# Patient Record
Sex: MALE | Race: BLACK OR AFRICAN AMERICAN | Employment: FULL TIME | URBAN - METROPOLITAN AREA
[De-identification: names, ages, dates, MRNs, and addresses within clinical notes are randomized per-mention and may not be internally consistent; named-entity substitution may affect disease eponyms.]

---

## 2017-03-30 NOTE — PROGRESS NOTES
62 y.o. BLACK OR  male who presents for evaluation. He's here asking for refills of viagra. He's been  for 3 years but has found a new partner. No problem w drive, he just is not able to hold his erections. Wants std testing also    Past Medical History:   Diagnosis Date    Allergic rhinitis     Deviated septum and epistaxis Dr. Marla Patel 2009     Diabetes mellitus ([facility]     on basis of elev a1c 12/15    ED (erectile dysfunction)     Fatty liver     on US 6/10; Fib-4 was 0.91 from 5/12    H. pylori infection     EGD w dilation Dr Jami Schrader 3/15    HSV (herpes simplex virus) infection     Hyperlipidemia     Hypertension     Obesity     peak 292 lbs, bmi 37.8 from 2/14    Prediabetes 3/10    2 hr GTT nl     Prolactinoma ([facility]     Dr. Faiza Mcgee      Current Outpatient Prescriptions   Medication Sig    tadalafil (CIALIS) 20 mg tablet Take 1 Tab by mouth as needed.  ezetimibe (ZETIA) 10 mg tablet Take 1 Tab by mouth daily.  metFORMIN ER (GLUCOPHAGE XR) 500 mg tablet Take 2 Tabs by mouth daily (with dinner).  atorvastatin (LIPITOR) 80 mg tablet Take 1 Tab by mouth daily.  amLODIPine-benazepril (LOTREL) 2.5-10 mg per capsule Take 1 Cap by mouth daily.  cabergoline (DOSTINEX) 0.5 mg tablet Take 1 Tab by mouth every Monday.  omeprazole (PRILOSEC) 20 mg capsule Take 1 Cap by mouth daily.  valACYclovir (VALTREX) 1 gram tablet Take 1 Tab by mouth daily.  meloxicam (MOBIC) 15 mg tablet Take 1 Tab by mouth daily. No current facility-administered medications for this visit. Allergies   Allergen Reactions    Hydrochlorothiazide Other (comments)     Weight loss       Visit Vitals    /90    Pulse 91    Temp 98.4 °F (36.9 °C) (Oral)    Ht 6' 3\" (1.905 m)    Wt 292 lb (132.5 kg)    SpO2 99%    BMI 36.5 kg/m2   A&O x3  Affect is appropriate. Mood stable  No apparent distress  Anicteric, no JVD, adenopathy or thyromegaly.    No carotid bruits or radiated murmur  Lungs clear to auscultation, no wheezes or rales  Heart showed regular rate and rhythm. No murmur, rubs, gallops  Abdomen soft nontender, no hepatosplenomegaly or masses. Extremities without edema. Pulses 1-2+ symmetrically    Assessment and plan:  1. ED. We gave cialis, sfx discussed at length. Call if with problems or if he wants scripts for [place]      Above conditions discussed at length and patient vocalized understanding.   All questions answered to patient satifaction

## 2017-03-31 ENCOUNTER — OFFICE VISIT (OUTPATIENT)
Dept: INTERNAL MEDICINE CLINIC | Age: 57
End: 2017-03-31

## 2017-03-31 ENCOUNTER — TELEPHONE (OUTPATIENT)
Dept: INTERNAL MEDICINE CLINIC | Age: 57
End: 2017-03-31

## 2017-03-31 VITALS
SYSTOLIC BLOOD PRESSURE: 132 MMHG | TEMPERATURE: 98.4 F | OXYGEN SATURATION: 99 % | HEIGHT: 75 IN | WEIGHT: 292 LBS | HEART RATE: 91 BPM | DIASTOLIC BLOOD PRESSURE: 90 MMHG | BODY MASS INDEX: 36.31 KG/M2

## 2017-03-31 DIAGNOSIS — N52.8 OTHER MALE ERECTILE DYSFUNCTION: Primary | ICD-10-CM

## 2017-03-31 RX ORDER — TADALAFIL 20 MG/1
20 TABLET ORAL AS NEEDED
Qty: 10 TAB | Refills: 11 | Status: SHIPPED | OUTPATIENT
Start: 2017-03-31 | End: 2017-04-05 | Stop reason: SDUPTHER

## 2017-03-31 NOTE — MR AVS SNAPSHOT
Visit Information Date & Time Provider Department Dept. Phone Encounter #  
 3/31/2017  1:15 PM Jeffrey Amaya MD Internist of [address] [id number] Your Appointments 9/29/2017  2:30 PM  
Office Visit with Jeffrey Amaya MD  
Internist of [address] [facility] [address] [facility]) Appt Note: 6 mth f/u  
 [address] [facility], [address] [address] [address] [place]  
  
   
 [address], [address] Upcoming Health Maintenance Date Due Hepatitis C Screening 1960 FOOT EXAM Q1 2/1/1970 Pneumococcal 19-64 Medium Risk (1 of 1 - PPSV23) 2/1/1979 MICROALBUMIN Q1 11/27/2016 HEMOGLOBIN A1C Q6M 5/5/2017 LIPID PANEL Q1 11/5/2017 EYE EXAM RETINAL OR DILATED Q1 11/7/2017 DTaP/Tdap/Td series (5 - Td) 5/22/2022 COLONOSCOPY 6/22/2022 Allergies as of 3/31/2017  Review Complete On: 10/27/2016 By: Jeffrey Amaya MD  
  
 Severity Noted Reaction Type Reactions Hydrochlorothiazide    Other (comments) Weight loss Current Immunizations  Reviewed on 2/20/2017 Name Date DTaP 8/19/2009, 4/23/2002, 6/14/1998, 6/12/1998, 4/16/1998, 2/13/1998 HPV 9/15/2015, 9/5/2014 Hep A Vaccine 9/3/2013, 8/24/2012 Hep B Vaccine 6/12/1998, 1/12/1998, 12/12/1997 Hib 3/18/1999, 6/12/1998, 2/13/1998 IPV 4/23/2002, 6/14/1999, 4/16/1998, 2/13/1998 Influenza Vaccine 9/15/2015, 10/14/2014, 10/18/2013 Influenza Vaccine Whole 11/19/2011 MMR 4/23/2002, 12/15/1998 Meningococcal Vaccine 9/5/2014, 8/19/2009 TDAP Vaccine 5/22/2012 Zoster 5/22/2012 Zoster Vaccine, Live 8/15/2008, 12/15/1998 Not reviewed this visit Vitals BP Pulse Temp Height(growth percentile) Weight(growth percentile) SpO2  
 132/90 91 98.4 °F (36.9 °C) (Oral) 6' 3\" (1.905 m) 292 lb (132.5 kg) 99% BMI Smoking Status 36.5 kg/m2 Never Smoker Vitals History BMI and BSA Data Body Mass Index Body Surface Area  
 36.5 kg/m 2 2.65 m 2 Preferred Pharmacy Pharmacy Name Phone [facility] [address], [address] [facility] [phone] Your Updated Medication List  
  
   
This list is accurate as of: 3/31/17  1:57 PM.  Always use your most recent med list. amLODIPine-benazepril 2.5-10 mg per capsule Commonly known as:  Odean Mina Take 1 Cap by mouth daily. atorvastatin 80 mg tablet Commonly known as:  LIPITOR Take 1 Tab by mouth daily. cabergoline 0.5 mg tablet Commonly known as:  DOSTINEX Take 1 Tab by mouth every Monday.  
  
 ezetimibe 10 mg tablet Commonly known as:  Arleta Moron Take 1 Tab by mouth daily. meloxicam 15 mg tablet Commonly known as:  MOBIC Take 1 Tab by mouth daily. metFORMIN  mg tablet Commonly known as:  GLUCOPHAGE XR Take 2 Tabs by mouth daily (with dinner). omeprazole 20 mg capsule Commonly known as:  PRILOSEC Take 1 Cap by mouth daily. valACYclovir 1 gram tablet Commonly known as:  VALTREX Take 1 Tab by mouth daily. Introducing [facility] & HE[facility]! [employer] introduces [employer] patient portal. Now you can access parts of your medical record, email your doctor's office, and request medication refills online. 1. In your internet browser, go to [email or web address] 2. Click on the First Time User? Click Here link in the Sign In box. You will see the New Member Sign Up page. 3. Enter your [employer] Access Code exactly as it appears below. You will not need to use this code after youve completed the sign-up process. If you do not sign up before the expiration date, you must request a new code. · [employer] Access Code: [email or web address] Expires: 6/29/2017  1:57 PM 
 
4. Enter the last four digits of your Social Security Number (xxxx) and Date of Birth (mm/dd/yyyy) as indicated and click Submit.  You will be taken to the next sign-up page. 5. Create a [employer] ID. This will be your [employer] login ID and cannot be changed, so think of one that is secure and easy to remember. 6. Create a [employer] password. You can change your password at any time. 7. Enter your Password Reset Question and Answer. This can be used at a later time if you forget your password. 8. Enter your e-mail address. You will receive e-mail notification when new information is available in [address]. 9. Click Sign Up. You can now view and download portions of your medical record. 10. Click the Download Summary menu link to download a portable copy of your medical information. If you have questions, please visit the Frequently Asked Questions section of the [employer] website. Remember, [employer] is NOT to be used for urgent needs. For medical emergencies, dial 911. Now available from your iPhone and Android! Please provide this summary of care documentation to your next provider. Your primary care clinician is listed as Charli Phoenix. If you have any questions after today's visit, please call [phone].

## 2017-03-31 NOTE — TELEPHONE ENCOUNTER
Pt is calling for the medication RD was going to call in for him today at his appt. Pt did not know of the name and said he will call back Monday.

## 2017-03-31 NOTE — PROGRESS NOTES
1. Have you been to the ER, urgent care clinic or hospitalized since your last visit? NO.     2. Have you seen or consulted any other health care providers outside of the [address] [place] [address] since your last visit (Include any pap smears or colon screening)? NO      Do you have an Advanced Directive? NO    Would you like information on Advanced Directives?  YES

## 2017-04-04 ENCOUNTER — TELEPHONE (OUTPATIENT)
Dept: INTERNAL MEDICINE CLINIC | Age: 57
End: 2017-04-04

## 2017-04-04 NOTE — TELEPHONE ENCOUNTER
Pt calling says he needs information for website for his medication. Wants it written so he can . Says rd will know what he is talking about. Says they discussed at visit.

## 2017-04-05 RX ORDER — TADALAFIL 20 MG/1
20 TABLET ORAL AS NEEDED
Qty: 30 TAB | Refills: 11 | Status: SHIPPED | OUTPATIENT
Start: 2017-04-05 | End: 2017-09-29 | Stop reason: SDUPTHER

## 2017-06-09 DIAGNOSIS — B00.9 HSV INFECTION: ICD-10-CM

## 2017-06-09 RX ORDER — VALACYCLOVIR HYDROCHLORIDE 1 G/1
1000 TABLET, FILM COATED ORAL DAILY
Qty: 90 TAB | Refills: 3 | Status: SHIPPED | OUTPATIENT
Start: 2017-06-09 | End: 2017-09-29 | Stop reason: SDUPTHER

## 2017-09-28 NOTE — PROGRESS NOTES
62 y.o. BLACK OR  male who presents for evaluation. No cardiovascular complaints. BP has been running high when he checks outside    No polyuria, polydipsia, nocturia, vision change, not checking sugars regularly. Weight is up some as he has not been exercising and not watching his diet as closely. [place]     Vitals 2017 3/31/2017 10/27/2016 3/24/2016 2015   Weight 294 lb 292 lb 288 lb 280 lb      Vitals 2015   Weight 288 lb  282 lb     Denies any  or GI complaints outside of occasional constipation    No headache, vision change, breast discharge, remains on dostinex    Still having issues with the knee but back to normal activities    The cialis is working well, getting his supply from [place] [facility]    Past Medical History:   Diagnosis Date    Allergic rhinitis     Deviated septum and epistaxis Dr. Elder Ennis      Diabetes mellitus ([address]     on basis of elev a1c 12/15    ED (erectile dysfunction)     Fatty liver     on US 6/10; Fib-4 was 0.91 from     H. pylori infection     EGD w dilation Dr Darius Cintron 3/15    HSV (herpes simplex virus) infection     Hyperlipidemia     Hypertension     Obesity     peak 292 lbs, bmi 37.8 from     Prediabetes 3/10    2 hr GTT nl     Prolactinoma (HCC)     Dr. Noemy Dupont      Past Surgical History:   Procedure Laterality Date   Brii Bustamante  negative    HX GI      negative barium enema    HX ORTHOPAEDIC  2015    medial meniscus tear left knee Dr Keene  History    Marital status:      Spouse name: N/A    Number of children: 2    Years of education: N/A     Occupational History     NG      Social History Main Topics    Smoking status: Never Smoker    Smokeless tobacco: Never Used    Alcohol use No    Drug use: No    Sexual activity: Not on file     Other Topics Concern    Not on file     Social History Narrative     Allergies   Allergen Reactions    Hydrochlorothiazide Other (comments)     Weight loss       Current Outpatient Prescriptions   Medication Sig    valACYclovir (VALTREX) 1 gram tablet Take 1 Tab by mouth daily.  tadalafil (CIALIS) 20 mg tablet Take 1 Tab by mouth as needed.  ezetimibe (ZETIA) 10 mg tablet Take 1 Tab by mouth daily.  metFORMIN ER (GLUCOPHAGE XR) 500 mg tablet Take 2 Tabs by mouth daily (with dinner).  [START ON 10/2/2017] cabergoline (DOSTINEX) 0.5 mg tablet Take 1 Tab by mouth every Monday.  omeprazole (PRILOSEC) 20 mg capsule Take 1 Cap by mouth daily.  meloxicam (MOBIC) 15 mg tablet Take 1 Tab by mouth daily.  atorvastatin (LIPITOR) 80 mg tablet Take 1 Tab by mouth daily.  amLODIPine-benazepril (LOTREL) 5-20 mg per capsule Take 1 Cap by mouth daily. No current facility-administered medications for this visit. REVIEW OF SYSTEMS: colo 6/12 Dr Kaitlynn Dejesus  Ophtho - no vision change or eye pain  Oral - no mouth pain, tongue or tooth problems  Ears - no hearing loss, ear pain, fullness, no swallowing problems  Cardiac - no CP, PND, orthopnea, edema, palpitations or syncope  Chest - no breast masses  Resp - no wheezing, chronic coughing, dyspnea  GI - no heartburn, nausea, vomiting, change in bowel habits, bleeding, hemorrhoids  Urinary - no dysuria, hematuria, flank pain, urgency, frequency  Constitutional - no wt loss, night sweats, unexplained fevers  Neuro - no focal weakness, numbness, paresthesias, incoordination, ataxia, involuntary movements  Endo - no polyuria, polydipsia, nocturia, hot flashes    Visit Vitals    /89    Pulse 82    Temp 98.4 °F (36.9 °C) (Oral)    Resp 14    Ht 6' 3\" (1.905 m)    Wt 294 lb (133.4 kg)    SpO2 99%    BMI 36.75 kg/m2     A&O x3  Affect is appropriate. Mood stable  No apparent distress  Anicteric, no JVD, adenopathy or thyromegaly.    No carotid bruits or radiated murmur  Lungs clear to auscultation, no wheezes or rales  Heart showed regular rate and rhythm. No murmur, rubs, gallops  Abdomen soft nontender, no hepatosplenomegaly or masses. Extremities without edema.   Pulses 1-2+ symmetrically    LABS  From 2/10 showed    gluc 111, cr 1.10, gfr>60, alt 51, hba1c 6.0,                   chol 278, tg 122,  hdl 56, ldl-c 198, wbc 3.2, hb 13.8, plt 230, psa 0.60  From 3/10 showed                                 2hr GTT 88  From 6/10 showed    gluc 105, cr 1.30, gfr>60, alt 59,                              wbc 3.6, hb 13.9, plt 237  From 10/11 showed  gluc 102, cr 1.18, gfr 82,  alt 22, hba1c 6.5,                             wbc 3.5, hb 13.5, plt 255, psa 1.20,  prl 30.8  From 11/11 showed                ldl-p 1245,                         2 hr GTT 89  From 2/12 showed                                      ua neg  From 5/12 showed    gluc 109, cr 1.10, gfr>60, alt 17, hba1c 6.1,                   chol 224, tg 70,   hdl 70, ldl-c 140,  wbc 3.8, hb 14.3, plt 250, psa 1.08  From 10/12 showed  gluc 98,   cr 1.00, gfr>60, alt 23, hba1c 6.2, ldl-p 1647, chol 216, tg 64,   hdl 63, ldl-c 148  From 1/14 showed    gluc 99,    cr 1.30, gfr 58,  alt 31, hba1c 6.4,      chol 196, tg 81,   hdl 67, ldl-c 113  From 8/14 showed    gluc 100,  cr 1.20, gfr>60, alt 19, hba1c 6.2,      chol 288, tg 140, hdl 69, ldl-c 191          umar neg  From 9/14 showed                        tsh 1.84  From 2/15 showed    gluc 118, cr 1.34, gfr 68,  alt 19, hba1c 6.4,      chol 301, tg 190, hdl 63, ldl-c 200,          umar neg   From 8/15 showed         hba1c 6.3,      chol 267, tg 90,   hdl 70, ldl-c 179  From 12/15 showed  gluc 102, cr 1.29, gfr>60,     hba1c 6.8,                umar 2  From 3/16 showed    gluc 96,   cr 1.23, gfr>60,                    wbc 4.1, hb 13.3, plt 240  From 10/16 showed  gluc 110, cr 1.13, gfr>60, alt 29, hba1c 6.4,      chol 233, tg 120, hdl 73, ldl-c 136    Patient Active Problem List   Diagnosis Code    Prolactin microadenoma Dr. Shruthi Barron D35.2    Hyperlipidemia E78.5    Essential hypertension I10    Diabetes mellitus type 2, uncontrolled ([address] E11.65    Obesity (BMI 30-39. 9) E66.9    Erectile dysfunction N52.9     Assessment and plan:  1. Ortho. F/U Dr Sherita Clarke  2. Obesity. Calorie counting, lifestyle and dietary measures reiterated, previously declined appetite supp  3. DM. Check labs, f/u ophth  4.  Erectile dysfunction. Prn meds  5. Prolactinoma. Continue dostinex  6. HLP. Check labs  7. HTN. Inc lotrel to 5/20 and call in readings  8. Flu and pvx-23 given      RTC 3/18    Above conditions discussed at length and patient vocalized understanding.   All questions answered to patient satifaction

## 2017-09-29 ENCOUNTER — OFFICE VISIT (OUTPATIENT)
Dept: INTERNAL MEDICINE CLINIC | Age: 57
End: 2017-09-29

## 2017-09-29 VITALS
WEIGHT: 294 LBS | RESPIRATION RATE: 14 BRPM | BODY MASS INDEX: 36.56 KG/M2 | OXYGEN SATURATION: 99 % | HEIGHT: 75 IN | HEART RATE: 82 BPM | TEMPERATURE: 98.4 F | SYSTOLIC BLOOD PRESSURE: 144 MMHG | DIASTOLIC BLOOD PRESSURE: 89 MMHG

## 2017-09-29 DIAGNOSIS — M25.561 CHRONIC ARTHRALGIAS OF KNEES AND HIPS: ICD-10-CM

## 2017-09-29 DIAGNOSIS — M25.552 CHRONIC ARTHRALGIAS OF KNEES AND HIPS: ICD-10-CM

## 2017-09-29 DIAGNOSIS — N52.9 ERECTILE DYSFUNCTION, UNSPECIFIED ERECTILE DYSFUNCTION TYPE: ICD-10-CM

## 2017-09-29 DIAGNOSIS — E78.5 HYPERLIPIDEMIA, UNSPECIFIED HYPERLIPIDEMIA TYPE: ICD-10-CM

## 2017-09-29 DIAGNOSIS — G89.29 CHRONIC ARTHRALGIAS OF KNEES AND HIPS: ICD-10-CM

## 2017-09-29 DIAGNOSIS — K21.9 GASTROESOPHAGEAL REFLUX DISEASE WITHOUT ESOPHAGITIS: ICD-10-CM

## 2017-09-29 DIAGNOSIS — E78.00 HYPERCHOLESTEROLEMIA: ICD-10-CM

## 2017-09-29 DIAGNOSIS — M25.562 CHRONIC ARTHRALGIAS OF KNEES AND HIPS: ICD-10-CM

## 2017-09-29 DIAGNOSIS — Z00.00 PHYSICAL EXAM: ICD-10-CM

## 2017-09-29 DIAGNOSIS — I10 ESSENTIAL HYPERTENSION: ICD-10-CM

## 2017-09-29 DIAGNOSIS — E11.65 UNCONTROLLED TYPE 2 DIABETES MELLITUS WITH COMPLICATION, UNSPECIFIED LONG TERM INSULIN USE STATUS: Primary | ICD-10-CM

## 2017-09-29 DIAGNOSIS — E66.9 OBESITY (BMI 30-39.9): ICD-10-CM

## 2017-09-29 DIAGNOSIS — M25.551 CHRONIC ARTHRALGIAS OF KNEES AND HIPS: ICD-10-CM

## 2017-09-29 DIAGNOSIS — D35.2 PROLACTINOMA (HCC): ICD-10-CM

## 2017-09-29 DIAGNOSIS — E11.8 UNCONTROLLED TYPE 2 DIABETES MELLITUS WITH COMPLICATION, UNSPECIFIED LONG TERM INSULIN USE STATUS: Primary | ICD-10-CM

## 2017-09-29 DIAGNOSIS — Z23 ENCOUNTER FOR IMMUNIZATION: ICD-10-CM

## 2017-09-29 DIAGNOSIS — B00.9 HSV INFECTION: ICD-10-CM

## 2017-09-29 RX ORDER — CABERGOLINE 0.5 MG/1
0.5 TABLET ORAL
Qty: 12 TAB | Refills: 3 | Status: SHIPPED | OUTPATIENT
Start: 2017-10-02 | End: 2018-07-31 | Stop reason: SDUPTHER

## 2017-09-29 RX ORDER — MELOXICAM 15 MG/1
15 TABLET ORAL DAILY
Qty: 90 TAB | Refills: 3 | Status: SHIPPED | OUTPATIENT
Start: 2017-09-29 | End: 2019-01-31

## 2017-09-29 RX ORDER — AMLODIPINE AND BENAZEPRIL HYDROCHLORIDE 5; 20 MG/1; MG/1
1 CAPSULE ORAL DAILY
Qty: 90 CAP | Refills: 3 | Status: SHIPPED | OUTPATIENT
Start: 2017-09-29 | End: 2018-10-09 | Stop reason: SDUPTHER

## 2017-09-29 RX ORDER — OMEPRAZOLE 20 MG/1
20 CAPSULE, DELAYED RELEASE ORAL DAILY
Qty: 90 CAP | Refills: 3 | Status: SHIPPED | OUTPATIENT
Start: 2017-09-29 | End: 2019-01-31

## 2017-09-29 RX ORDER — ATORVASTATIN CALCIUM 80 MG/1
80 TABLET, FILM COATED ORAL DAILY
Qty: 90 TAB | Refills: 3 | Status: CANCELLED | OUTPATIENT
Start: 2017-09-29

## 2017-09-29 RX ORDER — ATORVASTATIN CALCIUM 80 MG/1
80 TABLET, FILM COATED ORAL DAILY
Qty: 90 TAB | Refills: 3 | Status: SHIPPED | OUTPATIENT
Start: 2017-09-29 | End: 2018-04-06 | Stop reason: SDUPTHER

## 2017-09-29 RX ORDER — TADALAFIL 20 MG/1
20 TABLET ORAL AS NEEDED
Qty: 30 TAB | Refills: 11 | Status: SHIPPED | OUTPATIENT
Start: 2017-09-29 | End: 2018-08-17 | Stop reason: SDUPTHER

## 2017-09-29 RX ORDER — VALACYCLOVIR HYDROCHLORIDE 1 G/1
1000 TABLET, FILM COATED ORAL DAILY
Qty: 90 TAB | Refills: 3 | Status: SHIPPED | OUTPATIENT
Start: 2017-09-29 | End: 2018-09-19 | Stop reason: SDUPTHER

## 2017-09-29 RX ORDER — AMLODIPINE BESYLATE AND BENAZEPRIL HYDROCHLORIDE 2.5; 1 MG/1; MG/1
1 CAPSULE ORAL DAILY
Qty: 90 CAP | Refills: 3 | Status: CANCELLED | OUTPATIENT
Start: 2017-09-29

## 2017-09-29 RX ORDER — METFORMIN HYDROCHLORIDE 500 MG/1
1000 TABLET, EXTENDED RELEASE ORAL
Qty: 90 TAB | Refills: 3 | Status: SHIPPED | OUTPATIENT
Start: 2017-09-29 | End: 2018-04-06 | Stop reason: SINTOL

## 2017-09-29 RX ORDER — EZETIMIBE 10 MG/1
10 TABLET ORAL DAILY
Qty: 30 TAB | Refills: 11 | Status: SHIPPED | OUTPATIENT
Start: 2017-09-29 | End: 2018-10-09 | Stop reason: SDUPTHER

## 2017-09-29 NOTE — PROGRESS NOTES
1. Have you been to the ER, urgent care clinic or hospitalized since your last visit? NO.     2. Have you seen or consulted any other health care providers outside of the [address] [place] [address] since your last visit (Include any pap smears or colon screening)? NO      Do you have an Advanced Directive? NO    Would you like information on Advanced Directives?  NO

## 2017-09-29 NOTE — PROGRESS NOTES
VO from Dr. Vaishali Doshi for Regular Influenza and PPSV 23. Regular Influenza given in Right Deltoid and PPSV23 given in Left Deltoid. No pain or reactions noted at injection site.

## 2017-09-29 NOTE — MR AVS SNAPSHOT
Visit Information Date & Time Provider Department Dept. Phone Encounter #  
 9/29/2017  2:30 PM Eliana Rodriguez MD Internist of [address] [place] [address] [phone] [id number] Your Appointments 4/6/2018  3:00 PM  
PHYSICAL with Eliana Rodriguez MD  
Internist of [address]) Appt Note: [address]  
 [address] [facility] -  [facility], [address] [place] [address]  
  
   
 [address], [address] Upcoming Health Maintenance Date Due Hepatitis C Screening 1960 FOOT EXAM Q1 2/1/1970 Pneumococcal 19-64 Medium Risk (1 of 1 - PPSV23) 2/1/1979 MICROALBUMIN Q1 11/27/2016 HEMOGLOBIN A1C Q6M 5/5/2017 INFLUENZA AGE 9 TO ADULT 8/1/2017 LIPID PANEL Q1 11/5/2017 EYE EXAM RETINAL OR DILATED Q1 11/7/2017 DTaP/Tdap/Td series (5 - Td) 5/22/2022 COLONOSCOPY 6/22/2022 Allergies as of 9/29/2017  Review Complete On: 9/29/2017 By: Ba Marcos Severity Noted Reaction Type Reactions Hydrochlorothiazide    Other (comments) Weight loss Current Immunizations  Reviewed on 9/28/2017 Name Date DTaP 8/19/2009, 4/23/2002, 6/14/1998, 6/12/1998, 4/16/1998, 2/13/1998 HPV 9/15/2015, 9/5/2014 Hep A Vaccine 9/3/2013, 8/24/2012 Hep B Vaccine 6/12/1998, 1/12/1998, 12/12/1997 Hib 3/18/1999, 6/12/1998, 2/13/1998 IPV 4/23/2002, 6/14/1999, 4/16/1998, 2/13/1998 Influenza Vaccine 9/15/2015, 10/14/2014, 10/18/2013 Influenza Vaccine Whole 11/19/2011 MMR 4/23/2002, 12/15/1998 Meningococcal ACWY Vaccine 9/5/2014, 8/19/2009 TDAP Vaccine 5/22/2012 Zoster 5/22/2012 Zoster Vaccine, Live 8/15/2008, 12/15/1998 Reviewed by Eliana Rodriguez MD on 9/28/2017 at  4:34 AM  
You Were Diagnosed With   
  
 Codes Comments Erectile dysfunction, unspecified erectile dysfunction type    -  Primary ICD-10-CM: N52.9 ICD-9-CM: 607.84 HSV infection     ICD-10-CM: B00.9 ICD-9-CM: 054.9 Hyperlipidemia, unspecified hyperlipidemia type     ICD-10-CM: E78.5 ICD-9-CM: 272.4 Uncontrolled type 2 diabetes mellitus with complication, unspecified long term insulin use status (HCC)     ICD-10-CM: E11.8, E11.65 ICD-9-CM: 250.92 Hypercholesterolemia     ICD-10-CM: E78.00 ICD-9-CM: 272.0 Essential hypertension     ICD-10-CM: I10 
ICD-9-CM: 401.9 Prolactinoma ([address]     ICD-10-CM: D35.2 ICD-9-CM: 227.3 Gastroesophageal reflux disease without esophagitis     ICD-10-CM: K21.9 ICD-9-CM: 530.81 Chronic arthralgias of knees and hips     ICD-10-CM: M25.551, G89.29, M25.561, M25.552, M25.562 ICD-9-CM: 719.45, 719.46 Obesity (BMI 30-39. 9)     ICD-10-CM: E66.9 ICD-9-CM: 278.00 Vitals BP Pulse Temp Resp Height(growth percentile) Weight(growth percentile) 144/89 82 98.4 °F (36.9 °C) (Oral) 14 6' 3\" (1.905 m) 294 lb (133.4 kg) SpO2 BMI Smoking Status 99% 36.75 kg/m2 Never Smoker Vitals History BMI and BSA Data Body Mass Index Body Surface Area 36.75 kg/m 2 2.66 m 2 Preferred Pharmacy Pharmacy Name Phone [facility], [address] [place] [address] [phone] Your Updated Medication List  
  
   
This list is accurate as of: 9/29/17  3:16 PM.  Always use your most recent med list. amLODIPine-benazepril 5-20 mg per capsule Commonly known as:  Hitesh Sill Take 1 Cap by mouth daily. atorvastatin 80 mg tablet Commonly known as:  LIPITOR Take 1 Tab by mouth daily. cabergoline 0.5 mg tablet Commonly known as:  DOSTINEX Take 1 Tab by mouth every Monday. Start taking on:  10/2/2017  
  
 ezetimibe 10 mg tablet Commonly known as:  Scarlett Lalo Take 1 Tab by mouth daily. meloxicam 15 mg tablet Commonly known as:  MOBIC Take 1 Tab by mouth daily. metFORMIN  mg tablet Commonly known as:  GLUCOPHAGE XR Take 2 Tabs by mouth daily (with dinner). omeprazole 20 mg capsule Commonly known as:  PRILOSEC Take 1 Cap by mouth daily. tadalafil 20 mg tablet Commonly known as:  CIALIS Take 1 Tab by mouth as needed. valACYclovir 1 gram tablet Commonly known as:  VALTREX Take 1 Tab by mouth daily. Prescriptions Printed Refills  
 tadalafil (CIALIS) 20 mg tablet 11 Sig: Take 1 Tab by mouth as needed. Class: Print Route: Oral  
  
Prescriptions Sent to Pharmacy Refills  
 valACYclovir (VALTREX) 1 gram tablet 3 Sig: Take 1 Tab by mouth daily. Class: Normal  
 Pharmacy: [facility] [address] [facility] [address] Ph #: [phone] Route: Oral  
 ezetimibe (ZETIA) 10 mg tablet 11 Sig: Take 1 Tab by mouth daily. Class: Normal  
 Pharmacy: [facility] [address] Ph #: [phone] Route: Oral  
 metFORMIN ER (GLUCOPHAGE XR) 500 mg tablet 3 Sig: Take 2 Tabs by mouth daily (with dinner). Class: Normal  
 Pharmacy: [facility] [address] Ph #: [phone] Route: Oral  
 cabergoline (DOSTINEX) 0.5 mg tablet 3 Starting on: 10/2/2017 Sig: Take 1 Tab by mouth every Monday. Class: Normal  
 Pharmacy: [facility] NTQ-5543 [facility] [address] Ph #: [phone] Route: Oral  
 omeprazole (PRILOSEC) 20 mg capsule 3 Sig: Take 1 Cap by mouth daily. Class: Normal  
 Pharmacy: [facility]19 [address] [facility] [address] Ph #: [phone] Route: Oral  
 meloxicam (MOBIC) 15 mg tablet 3 Sig: Take 1 Tab by mouth daily. Class: Normal  
 Pharmacy: [facility] [address] Ph #: [phone] Route: Oral  
 atorvastatin (LIPITOR) 80 mg tablet 3 Sig: Take 1 Tab by mouth daily.   
 Class: Normal  
 Pharmacy: [facility]46 [address] [facility], [facility] Ph #: [phone] Route: Oral  
 amLODIPine-benazepril (LOTREL) 5-20 mg per capsule 3 Sig: Take 1 Cap by mouth daily. Class: Normal  
 Pharmacy: [facility]759 [address], [address] [facility] Ph #: [phone] Route: Oral  
  
Introducing [facility]! [facility] introduces [employer] patient portal. Now you can access parts of your medical record, email your doctor's office, and request medication refills online. 1. In your internet browser, go to [email or web address] 2. Click on the First Time User? Click Here link in the Sign In box. You will see the New Member Sign Up page. 3. Enter your [employer] Access Code exactly as it appears below. You will not need to use this code after youve completed the sign-up process. If you do not sign up before the expiration date, you must request a new code. · [employer] Access Code: [email or web address] Expires: 12/28/2017  2:27 PM 
 
4. Enter the last four digits of your Social Security Number (xxxx) and Date of Birth (mm/dd/yyyy) as indicated and click Submit. You will be taken to the next sign-up page. 5. Create a [employer] ID. This will be your [employer] login ID and cannot be changed, so think of one that is secure and easy to remember. 6. Create a [employer] password. You can change your password at any time. 7. Enter your Password Reset Question and Answer. This can be used at a later time if you forget your password. 8. Enter your e-mail address. You will receive e-mail notification when new information is available in [address] 9. Click Sign Up. You can now view and download portions of your medical record. 10. Click the Download Summary menu link to download a portable copy of your medical information.  
 
If you have questions, please visit the Frequently Asked Questions section of the [facility]. Remember, [employer] is NOT to be used for urgent needs. For medical emergencies, dial 911. Now available from your iPhone and Android! Please provide this summary of care documentation to your next provider. Your primary care clinician is listed as Pio Kirkpatrick. If you have any questions after today's visit, please call [phone].

## 2017-09-30 ENCOUNTER — HOSPITAL ENCOUNTER (OUTPATIENT)
Dept: LAB | Age: 57
Discharge: HOME | End: 2017-09-30
Payer: COMMERCIAL

## 2017-09-30 DIAGNOSIS — Z00.00 PHYSICAL EXAM: ICD-10-CM

## 2017-09-30 DIAGNOSIS — I10 ESSENTIAL HYPERTENSION: ICD-10-CM

## 2017-09-30 DIAGNOSIS — E11.65 UNCONTROLLED TYPE 2 DIABETES MELLITUS WITH COMPLICATION, UNSPECIFIED LONG TERM INSULIN USE STATUS: ICD-10-CM

## 2017-09-30 DIAGNOSIS — E11.8 UNCONTROLLED TYPE 2 DIABETES MELLITUS WITH COMPLICATION, UNSPECIFIED LONG TERM INSULIN USE STATUS: ICD-10-CM

## 2017-09-30 LAB
ALBUMIN SERPL-MCNC: 4 G/DL (ref 3.4–5)
ALBUMIN/GLOB SERPL: 1.1 {RATIO} (ref 0.8–1.7)
ALP SERPL-CCNC: 53 U/L (ref 45–117)
ALT SERPL-CCNC: 29 U/L (ref 16–61)
ANION GAP SERPL CALC-SCNC: 5 MMOL/L (ref 3–18)
AST SERPL-CCNC: 20 U/L (ref 15–37)
BILIRUB SERPL-MCNC: 0.9 MG/DL (ref 0.2–1)
BUN SERPL-MCNC: 12 MG/DL (ref 7–18)
BUN/CREAT SERPL: 10 (ref 12–20)
CALCIUM SERPL-MCNC: 9.3 MG/DL (ref 8.5–10.1)
CHLORIDE SERPL-SCNC: 103 MMOL/L (ref 100–108)
CHOLEST SERPL-MCNC: 240 MG/DL
CO2 SERPL-SCNC: 30 MMOL/L (ref 21–32)
CREAT SERPL-MCNC: 1.25 MG/DL (ref 0.6–1.3)
CREAT UR-MCNC: 138 MG/DL (ref 30–125)
ERYTHROCYTE [DISTWIDTH] IN BLOOD BY AUTOMATED COUNT: 13.6 % (ref 11.6–14.5)
GLOBULIN SER CALC-MCNC: 3.8 G/DL (ref 2–4)
GLUCOSE SERPL-MCNC: 99 MG/DL (ref 74–99)
HBA1C MFR BLD: 6.5 % (ref 4.2–5.6)
HCT VFR BLD AUTO: 42.2 % (ref 36–48)
HDLC SERPL-MCNC: 79 MG/DL (ref 40–60)
HDLC SERPL: 3 {RATIO} (ref 0–5)
HGB BLD-MCNC: 14.4 G/DL (ref 13–16)
LDLC SERPL CALC-MCNC: 142.4 MG/DL (ref 0–100)
LIPID PROFILE,FLP: ABNORMAL
MCH RBC QN AUTO: 27.9 PG (ref 24–34)
MCHC RBC AUTO-ENTMCNC: 34.1 G/DL (ref 31–37)
MCV RBC AUTO: 81.6 FL (ref 74–97)
MICROALBUMIN UR-MCNC: <0.5 MG/DL (ref 0–3)
MICROALBUMIN/CREAT UR-RTO: ABNORMAL MG/G (ref 0–30)
PLATELET # BLD AUTO: 217 K/UL (ref 135–420)
PMV BLD AUTO: 10.1 FL (ref 9.2–11.8)
POTASSIUM SERPL-SCNC: 4.4 MMOL/L (ref 3.5–5.5)
PROT SERPL-MCNC: 7.8 G/DL (ref 6.4–8.2)
PSA SERPL-MCNC: 3.8 NG/ML (ref 0–4)
RBC # BLD AUTO: 5.17 M/UL (ref 4.7–5.5)
SODIUM SERPL-SCNC: 138 MMOL/L (ref 136–145)
TRIGL SERPL-MCNC: 93 MG/DL (ref ?–150)
VLDLC SERPL CALC-MCNC: 18.6 MG/DL
WBC # BLD AUTO: 3.9 K/UL (ref 4.6–13.2)

## 2017-09-30 PROCEDURE — 83036 HEMOGLOBIN GLYCOSYLATED A1C: CPT | Performed by: INTERNAL MEDICINE

## 2017-09-30 PROCEDURE — 80061 LIPID PANEL: CPT | Performed by: INTERNAL MEDICINE

## 2017-09-30 PROCEDURE — 84153 ASSAY OF PSA TOTAL: CPT | Performed by: INTERNAL MEDICINE

## 2017-09-30 PROCEDURE — 82043 UR ALBUMIN QUANTITATIVE: CPT | Performed by: INTERNAL MEDICINE

## 2017-09-30 PROCEDURE — 80053 COMPREHEN METABOLIC PANEL: CPT | Performed by: INTERNAL MEDICINE

## 2017-09-30 PROCEDURE — 85027 COMPLETE CBC AUTOMATED: CPT | Performed by: INTERNAL MEDICINE

## 2017-09-30 PROCEDURE — 36415 COLL VENOUS BLD VENIPUNCTURE: CPT | Performed by: INTERNAL MEDICINE

## 2017-10-04 NOTE — TELEPHONE ENCOUNTER
Patient is requesting rx for One Touch test strips be called in to [address] [place] [address].

## 2017-10-05 ENCOUNTER — TELEPHONE (OUTPATIENT)
Dept: INTERNAL MEDICINE CLINIC | Age: 57
End: 2017-10-05

## 2017-10-05 DIAGNOSIS — E11.65 UNCONTROLLED TYPE 2 DIABETES MELLITUS WITH COMPLICATION, UNSPECIFIED LONG TERM INSULIN USE STATUS: Primary | ICD-10-CM

## 2017-10-05 DIAGNOSIS — E11.8 UNCONTROLLED TYPE 2 DIABETES MELLITUS WITH COMPLICATION, UNSPECIFIED LONG TERM INSULIN USE STATUS: Primary | ICD-10-CM

## 2017-10-05 DIAGNOSIS — R97.20 PSA ELEVATION: ICD-10-CM

## 2017-10-05 NOTE — TELEPHONE ENCOUNTER
pls call    Results for orders placed or performed during the hospital encounter of 09/30/17   CBC W/O DIFF   Result Value Ref Range    WBC 3.9 (L) 4.6 - 13.2 K/uL    RBC 5.17 4.70 - 5.50 M/uL    HGB 14.4 13.0 - 16.0 g/dL    HCT 42.2 36.0 - 48.0 %    MCV 81.6 74.0 - 97.0 FL    MCH 27.9 24.0 - 34.0 PG    MCHC 34.1 31.0 - 37.0 g/dL    RDW 13.6 11.6 - 14.5 %    PLATELET 724 453 - 703 K/uL    MPV 10.1 9.2 - 11.8 FL   LIPID PANEL   Result Value Ref Range    LIPID PROFILE          Cholesterol, total 240 (H) <200 MG/DL    Triglyceride 93 <150 MG/DL    HDL Cholesterol 79 (H) 40 - 60 MG/DL    LDL, calculated 142.4 (H) 0 - 100 MG/DL    VLDL, calculated 18.6 MG/DL    CHOL/HDL Ratio 3.0 0 - 5.0     METABOLIC PANEL, COMPREHENSIVE   Result Value Ref Range    Sodium 138 136 - 145 mmol/L    Potassium 4.4 3.5 - 5.5 mmol/L    Chloride 103 100 - 108 mmol/L    CO2 30 21 - 32 mmol/L    Anion gap 5 3.0 - 18 mmol/L    Glucose 99 74 - 99 mg/dL    BUN 12 7.0 - 18 MG/DL    Creatinine 1.25 0.6 - 1.3 MG/DL    BUN/Creatinine ratio 10 (L) 12 - 20      GFR est AA >60 >60 ml/min/1.73m2    GFR est non-AA 60 (L) >60 ml/min/1.73m2    Calcium 9.3 8.5 - 10.1 MG/DL    Bilirubin, total 0.9 0.2 - 1.0 MG/DL    ALT (SGPT) 29 16 - 61 U/L    AST (SGOT) 20 15 - 37 U/L    Alk. phosphatase 53 45 - 117 U/L    Protein, total 7.8 6.4 - 8.2 g/dL    Albumin 4.0 3.4 - 5.0 g/dL    Globulin 3.8 2.0 - 4.0 g/dL    A-G Ratio 1.1 0.8 - 1.7     HEMOGLOBIN A1C W/O EAG   Result Value Ref Range    Hemoglobin A1c 6.5 (H) 4.2 - 5.6 %   PSA, DIAGNOSTIC (PROSTATE SPECIFIC AG)   Result Value Ref Range    Prostate Specific Ag 3.8 0.0 - 4.0 ng/mL   MICROALBUMIN, UR, RAND   Result Value Ref Range    Microalbumin,urine random <0.50 0 - 3.0 MG/DL    Creatinine, urine 138.00 (H) 30 - 125 mg/dL    Microalbumin/Creat ratio (mg/g creat)  0 - 30 mg/g     Cannot calculate ratio due to micro albumin result outside reportable range.      Labs ok except chol up  Is he missing doses?  psa upper limits normal    Recheck both in 6 mos  sched office visit pls

## 2017-10-10 NOTE — TELEPHONE ENCOUNTER
Returned call to patient to advised of message below. Patient stated that he ran out the Cholesterol medication so that is probably reason it is high however he just got in filled.   Patient has upcoming appointment for 04/06/2018

## 2017-12-02 NOTE — TELEPHONE ENCOUNTER
Pt arrived in 4K 13 via cart/stretcher. Complaints: pain in head, chin, and left shoulder. IV none infusing into the antecubital left, condition patent and no redness at a rate of 0 mls/ hour with about 0 mls remaining in the bag. The best day to schedule a follow up Dr appointment is:  Monday p.m.       The patient is interested in [facility]  No Pt called and stated tht his test strips were incorrect tht were sent to the pharmacy, they should have been the verio test strips, please correct and send them in, RD

## 2017-12-29 ENCOUNTER — TELEPHONE (OUTPATIENT)
Dept: INTERNAL MEDICINE CLINIC | Age: 57
End: 2017-12-29

## 2017-12-29 ENCOUNTER — OFFICE VISIT (OUTPATIENT)
Dept: INTERNAL MEDICINE CLINIC | Age: 57
End: 2017-12-29

## 2017-12-29 VITALS
HEIGHT: 75 IN | TEMPERATURE: 98.1 F | RESPIRATION RATE: 14 BRPM | HEART RATE: 81 BPM | BODY MASS INDEX: 35.37 KG/M2 | WEIGHT: 284.5 LBS | DIASTOLIC BLOOD PRESSURE: 100 MMHG | OXYGEN SATURATION: 98 % | SYSTOLIC BLOOD PRESSURE: 170 MMHG

## 2017-12-29 DIAGNOSIS — I10 ESSENTIAL HYPERTENSION: ICD-10-CM

## 2017-12-29 DIAGNOSIS — R20.0 LIP NUMBNESS: Primary | ICD-10-CM

## 2017-12-29 NOTE — MR AVS SNAPSHOT
Visit Information Date & Time Provider Department Dept. Phone Encounter #  
 12/29/2017 11:30 AM [facility] [address] [place] [address] Road [phone] [id number] Your Appointments 4/6/2018  3:00 PM  
PHYSICAL with Herbert Holland MD  
[facility] [address] [place] [address] [address]) Appt Note: r[address] rd  
 [address] [facility], [address] [id number] [address] [place]  
  
   
 [address], [address] Upcoming Health Maintenance Date Due Hepatitis C Screening 1960 FOOT EXAM Q1 2/1/1970 EYE EXAM RETINAL OR DILATED Q1 11/7/2017 HEMOGLOBIN A1C Q6M 3/30/2018 MICROALBUMIN Q1 9/30/2018 LIPID PANEL Q1 9/30/2018 DTaP/Tdap/Td series (5 - Td) 5/22/2022 COLONOSCOPY 6/22/2022 Allergies as of 12/29/2017  Review Complete On: 12/29/2017 By: Marisela Taylor Severity Noted Reaction Type Reactions Hydrochlorothiazide    Other (comments) Weight loss Current Immunizations  Reviewed on 9/28/2017 Name Date DTaP 8/19/2009, 4/23/2002, 6/14/1998, 6/12/1998, 4/16/1998, 2/13/1998 HPV 9/15/2015, 9/5/2014 Hep A Vaccine 9/3/2013, 8/24/2012 Hep B Vaccine 6/12/1998, 1/12/1998, 12/12/1997 Hib 3/18/1999, 6/12/1998, 2/13/1998 IPV 4/23/2002, 6/14/1999, 4/16/1998, 2/13/1998 Influenza Vaccine 9/15/2015, 10/14/2014, 10/18/2013 Influenza Vaccine (Quad) PF 9/29/2017  3:10 PM  
 Influenza Vaccine Whole 11/19/2011 MMR 4/23/2002, 12/15/1998 Meningococcal ACWY Vaccine 9/5/2014, 8/19/2009 Pneumococcal Polysaccharide (PPSV-23) 9/29/2017  3:10 PM  
 TDAP Vaccine 5/22/2012 Zoster 5/22/2012 Zoster Vaccine, Live 8/15/2008, 12/15/1998 Not reviewed this visit Vitals BP Pulse Temp Resp Height(growth percentile) Weight(growth percentile) (!) 170/100 (BP 1 Location: Left arm, BP Patient Position: Sitting) 81 98.1 °F (36.7 °C) (Oral) 14 6' 3\" (1.905 m) 284 lb 8 oz (129 kg) SpO2 BMI Smoking Status 98% 35.56 kg/m2 Never Smoker Vitals History BMI and BSA Data Body Mass Index Body Surface Area 35.56 kg/m 2 2.61 m 2 Preferred Pharmacy Pharmacy Name Phone [facility] [address], [address] [facility] [phone] Your Updated Medication List  
  
   
This list is accurate as of: 12/29/17 12:00 PM.  Always use your most recent med list. amLODIPine-benazepril 5-20 mg per capsule Commonly known as:  Odean Mina Take 1 Cap by mouth daily. atorvastatin 80 mg tablet Commonly known as:  LIPITOR Take 1 Tab by mouth daily. cabergoline 0.5 mg tablet Commonly known as:  DOSTINEX Take 1 Tab by mouth every Monday.  
  
 ezetimibe 10 mg tablet Commonly known as:  Arleta Moron Take 1 Tab by mouth daily. * glucose blood VI test strips strip Commonly known as:  ONETOUCH ULTRA TEST Patient to test Blood sugar 1 a day DX: E11.65  
  
 * glucose blood VI test strips strip Commonly known as:  Tommy Sarmiento Patient checks blood sugar daily, Dx E11.9  
  
 meloxicam 15 mg tablet Commonly known as:  MOBIC Take 1 Tab by mouth daily. metFORMIN  mg tablet Commonly known as:  GLUCOPHAGE XR Take 2 Tabs by mouth daily (with dinner). omeprazole 20 mg capsule Commonly known as:  PRILOSEC Take 1 Cap by mouth daily. tadalafil 20 mg tablet Commonly known as:  CIALIS Take 1 Tab by mouth as needed. valACYclovir 1 gram tablet Commonly known as:  VALTREX Take 1 Tab by mouth daily. * Notice: This list has 2 medication(s) that are the same as other medications prescribed for you. Read the directions carefully, and ask your doctor or other care provider to review them with you. Introducing [facility]!    
 [employer] introduces [employer] patient portal. Now you can access parts of your medical record, email your doctor's office, and request medication refills online. 1. In your internet browser, go to [email or web address] 2. Click on the First Time User? Click Here link in the Sign In box. You will see the New Member Sign Up page. 3. Enter your [employer] Access Code exactly as it appears below. You will not need to use this code after youve completed the sign-up process. If you do not sign up before the expiration date, you must request a new code. · [employer] Access Code: [email or web address] Expires: 3/29/2018 11:13 AM 
 
4. Enter the last four digits of your Social Security Number (xxxx) and Date of Birth (mm/dd/yyyy) as indicated and click Submit. You will be taken to the next sign-up page. 5. Create a Ynvisible ID. This will be your Ynvisible login ID and cannot be changed, so think of one that is secure and easy to remember. 6. Create a [employer]nvisible password. You can change your password at any time. 7. Enter your Password Reset Question and Answer. This can be used at a later time if you forget your password. 8. Enter your e-mail address. You will receive e-mail notification when new information is available in [address] 9. Click Sign Up. You can now view and download portions of your medical record. 10. Click the Download Summary menu link to download a portable copy of your medical information. If you have questions, please visit the Frequently Asked Questions section of the [employer] website. Remember, [employer] is NOT to be used for urgent needs. For medical emergencies, dial 911. Now available from your iPhone and Android! Please provide this summary of care documentation to your next provider. Your primary care clinician is listed as Leandra Reeves. If you have any questions after today's visit, please call [phone].

## 2017-12-29 NOTE — PROGRESS NOTES
HPI/History  Sharon Mccauley is a 62 y.o.  male who presents for evaluation. Pt reports intermittent tingling sensation of upper and lower lips over the last month. Lower lips more so than upper. Hx of fever blisters but not noted during this time and not similar presentation/onset. Pt takes valtrex daily for other issues. Admits to single occasion of aphthous ulcer but no additional lesions or other findings. Lips have been chapped and he has tried chapstick. No definite relation to meds during this time but he did notice it after taking yesterday but not at other times. There has been no swelling or other signs of angioedema or anaphylaxis. Specifically denies face, tongue, lip, or throat swelling along with no rashes, trouble swallowing/breathing, or other cardiopulmonary sxs. He has taken lotrel for years (at least 2011). He is on dostinex but unknown if related. Hx noted below including DM. 9/2017 cmp unremarkable; A1c 6.5 at the time. No other sxs or complaints. BP elevated today but did not take his medications as he usually does. 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 9) E66.9    Erectile dysfunction N52.9     Past Medical History:   Diagnosis Date    Allergic rhinitis     Deviated septum and epistaxis Dr. Carol Patricia 2009     Diabetes mellitus ([address]     on basis of elev a1c 12/15    ED (erectile dysfunction)     Fatty liver     on US 6/10; Fib-4 was 0.91 from 5/12    H. pylori infection     EGD w dilation Dr Kong Garcia 3/15    HSV (herpes simplex virus) infection     Hyperlipidemia     Hypertension     Obesity     peak 292 lbs, bmi 37.8 from 2/14    Prediabetes 3/10    2 hr GTT nl     Prolactinoma ([facility]     Dr. Lesly Young      Past Surgical History:   Procedure Laterality Date    HX COLONOSCOPY Dr. Linda Rivera 6/12 negative    HX GI  2/01    negative barium enema    HX ORTHOPAEDIC  11/2015    medial meniscus tear left knee Dr Patel Counts History    Marital status:      Spouse name: N/A    Number of children: 2    Years of education: N/A     Occupational History     NG      Social History Main Topics    Smoking status: Never Smoker    Smokeless tobacco: Never Used    Alcohol use No    Drug use: No    Sexual activity: Not on file     Other Topics Concern    Not on file     Social History Narrative     Family History   Problem Relation Age of Onset    Hypertension Mother     Hypertension Father     Stroke Father     Diabetes Sister     Cancer Brother      Current Outpatient Prescriptions   Medication Sig    glucose blood VI test strips (ONETOUCH VERIO) strip Patient checks blood sugar daily, Dx E11.9    glucose blood VI test strips (ONETOUCH ULTRA TEST) strip Patient to test Blood sugar 1 a day DX: E11.65    valACYclovir (VALTREX) 1 gram tablet Take 1 Tab by mouth daily.  tadalafil (CIALIS) 20 mg tablet Take 1 Tab by mouth as needed.  ezetimibe (ZETIA) 10 mg tablet Take 1 Tab by mouth daily.  metFORMIN ER (GLUCOPHAGE XR) 500 mg tablet Take 2 Tabs by mouth daily (with dinner).  cabergoline (DOSTINEX) 0.5 mg tablet Take 1 Tab by mouth every Monday.  omeprazole (PRILOSEC) 20 mg capsule Take 1 Cap by mouth daily.  meloxicam (MOBIC) 15 mg tablet Take 1 Tab by mouth daily.  atorvastatin (LIPITOR) 80 mg tablet Take 1 Tab by mouth daily.  amLODIPine-benazepril (LOTREL) 5-20 mg per capsule Take 1 Cap by mouth daily. No current facility-administered medications for this visit. Allergies   Allergen Reactions    Hydrochlorothiazide Other (comments)     Weight loss         Review of Systems  Aside from those included in HPI, remainder of complete ROS negative.     Physical Examination  Visit Vitals    BP (!) 170/100 (BP 1 Location: Left arm, BP Patient Position: Sitting)    Pulse 81    Temp 98.1 °F (36.7 °C) (Oral)    Resp 14    Ht 6' 3\" (1.905 m)    Wt 284 lb 8 oz (129 kg)    SpO2 98%    BMI 35.56 kg/m2     General - Alert and in no acute distress. Pt appears well, comfortable, and in good spirits. Pleasant, engaging. Nontoxic. Not anxious, non-diaphoretic. Mental status - Appropriate mood, behavior, speech content, dress, and thought processes. Dry lips but otherwise HEENT exam unremarkable. No oral/perioral lesions and no signs of angioedema. Pulm - No tachypnea, retractions, or cyanosis. Good respiratory effort. Clear to auscultation bilat. Cardiovascular - Normal rate, regular rhythm. No signs of anaphylaxis. No rashes noted. Assessment and Plan  1. Tingling of lips, lower > upper - Present episodically over the last month. No clear cause. No overt signs of medication reaction but possible. No overt angioedema or anaphylaxis. Discussed case with Dr. Ann Oscar and opted to continue current med regimen. Pt will use chapstick regularly and avoid extreme temps in regards to food/drink along with avoiding spicy or other irritative food/products/exposures. Pt opted to proceed with neuro evaluation. 2. HTN - did not take his BP meds today but will following visit and will monitor. No changes at this time regarding above but may reconsider pending progression. Return/call here if needed. Further planning as warranted. Pt happily agrees with plan. Discussed case with Dr. Ann Oscar. More than 25 mins spent during visit with more than 50% discussing above issues, potential causes/contributing factors, eval/tx, plan, and questions. PLEASE NOTE:   This document has been produced using voice recognition software. Unrecognized errors in transcription may be present.     Melida [employer] [facility]  [phone]  12/29/2017

## 2017-12-29 NOTE — PROGRESS NOTES
1. Have you been to the ER, urgent care clinic or hospitalized since your last visit? NO.     2. Have you seen or consulted any other health care providers outside of the [address] [place] [address] since your last visit (Include any pap smears or colon screening)? NO      Do you have an Advanced Directive? NO    Would you like information on Advanced Directives?  NO

## 2018-01-17 ENCOUNTER — TELEPHONE (OUTPATIENT)
Dept: INTERNAL MEDICINE CLINIC | Age: 58
End: 2018-01-17

## 2018-01-17 DIAGNOSIS — R20.2 FACIAL PARESTHESIA: Primary | ICD-10-CM

## 2018-01-17 NOTE — TELEPHONE ENCOUNTER
Had been referred to  Dr Jos eAngel Conde for his lip tingling- its happening again but they cant see him till 2/13- can you refer him somewhere else

## 2018-01-19 NOTE — TELEPHONE ENCOUNTER
I called Dr Rosie Diaz office is scheduling out into March/April for non emergent issues.  I have contacted Dr Akosua Mcgarry nurse to see if they can get the patient in sooner

## 2018-02-13 ENCOUNTER — OFFICE VISIT (OUTPATIENT)
Dept: NEUROLOGY | Age: 58
End: 2018-02-13

## 2018-02-13 VITALS
HEART RATE: 85 BPM | SYSTOLIC BLOOD PRESSURE: 162 MMHG | RESPIRATION RATE: 18 BRPM | HEIGHT: 75 IN | DIASTOLIC BLOOD PRESSURE: 88 MMHG | TEMPERATURE: 98.4 F | WEIGHT: 280 LBS | OXYGEN SATURATION: 98 % | BODY MASS INDEX: 34.82 KG/M2

## 2018-02-13 DIAGNOSIS — R20.0 PERIORAL NUMBNESS: Primary | ICD-10-CM

## 2018-02-13 RX ORDER — ASCORBIC ACID 250 MG
TABLET ORAL
COMMUNITY
End: 2019-01-31

## 2018-02-13 RX ORDER — BISMUTH SUBSALICYLATE 262 MG
1 TABLET,CHEWABLE ORAL DAILY
COMMUNITY

## 2018-02-13 NOTE — LETTER
2018 3:46 PM 
 
Patient:  Nessa Todd YOB: 1960 Date of Visit: 2018 Dear MD Chantel Mcmillan[address] [place] [address] VIA In Basket 
 : Thank you for referring Mr. Nessa Todd to me for evaluation/treatment. Below are the relevant portions of my assessment and plan of care. Nessa Todd is a 62 y.o., right handed male, with an established history of hypertension, diabetes and hypercholesterolemia, who was well till late December of last year till he started to feel numbness and tingling of the upper and lower lips. This seems to have man about the same since onset till about 2 weeks ago when he noticed this start to get better. There was no rash in the region, he just feels funny around his mouth. There's been no weakness or numbness anywhere else. He denies any facial asymmetry. There's been no vision changes or language changes, he's just had numbness around the lips. Social History; he's , lives with his son. He denies smoking, drinking or the use of illicit drugs. 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 Family History; mother living age 80, has hypertension. Father  from stroke, had hypertension, diabetes. Sister with diabetes. Brother with an aneurysm bleed. Current Outpatient Prescriptions Medication Sig Dispense Refill  multivitamin (ONE A DAY) tablet Take 1 Tab by mouth daily.  ascorbic acid, vitamin C, (VITAMIN C) 250 mg tablet Take  by mouth. Pt unsure of dosage  OTHER Vitamin A supplement  glucose blood VI test strips (ONETOUCH VERIO) strip Patient checks blood sugar daily, Dx E11.9 100 Strip 3  
 glucose blood VI test strips (ONETOUCH ULTRA TEST) strip Patient to test Blood sugar 1 a day DX: E11.65 100 Strip 3  valACYclovir (VALTREX) 1 gram tablet Take 1 Tab by mouth daily. 90 Tab 3  
 ezetimibe (ZETIA) 10 mg tablet Take 1 Tab by mouth daily.  30 Tab 11  
  metFORMIN ER (GLUCOPHAGE XR) 500 mg tablet Take 2 Tabs by mouth daily (with dinner). 90 Tab 3  
 cabergoline (DOSTINEX) 0.5 mg tablet Take 1 Tab by mouth every Monday. 12 Tab 3  
 omeprazole (PRILOSEC) 20 mg capsule Take 1 Cap by mouth daily. 90 Cap 3  
 meloxicam (MOBIC) 15 mg tablet Take 1 Tab by mouth daily. 90 Tab 3  
 atorvastatin (LIPITOR) 80 mg tablet Take 1 Tab by mouth daily. 90 Tab 3  
 amLODIPine-benazepril (LOTREL) 5-20 mg per capsule Take 1 Cap by mouth daily. 90 Cap 3  
 tadalafil (CIALIS) 20 mg tablet Take 1 Tab by mouth as needed. 30 Tab 11 Past Medical History:  
Diagnosis Date  Allergic rhinitis  Deviated septum and epistaxis Dr. Cherelle Camarillo 2009  Diabetes mellitus ([facility] on basis of elev a1c 12/15  ED (erectile dysfunction)  Fatty liver   
 on US 6/10; Fib-4 was 0.91 from 5/12  
 H. pylori infection EGD w dilation Dr Alba Morton 3/15  
 HSV (herpes simplex virus) infection  Hyperlipidemia  Hypertension  Obesity   
 peak 292 lbs, bmi 37.8 from 2/14  Prediabetes 3/10  
 2 hr GTT nl   
 Prolactinoma ([facility] Dr. Wan Olivas Past Surgical History:  
Procedure Laterality Date  HX COLONOSCOPY Dr. Kerri Bach 6/12 negative  HX GI  2/01  
 negative barium enema  HX ORTHOPAEDIC  11/2015  
 medial meniscus tear left knee Dr Laxmi Ashley Allergies Allergen Reactions  Hydrochlorothiazide Other (comments) Weight loss, pt unsure of allergy Patient Active Problem List  
Diagnosis Code  Prolactin microadenoma Dr. Wan Olivas D35.2  Hyperlipidemia E78.5  Essential hypertension I10  
 Diabetes mellitus type 2, uncontrolled ([facility] E11.65  
 Obesity (BMI 30-39. 9) E66.9  
 Erectile dysfunction N52.9 Review of Systems: As above otherwise 11 point review of systems negative including;  
Constitutional no fever or chills Skin denies rash or itching HENT  Denies tinnitus, hearing lose Eyes denies diplopia vision lose Respiratory denies shortness of breath Cardiovascular denies chest pain, dyspnea on exertion Gastrointestinal denies nausea, vomiting, diarrhea, constipation Genitourinary denies incontinence Musculoskeletal denies joint pain or swelling Endocrine denies weight change Hematology denies easy bruising or bleeding Neurological as above in HPI PHYSICAL EXAMINATION:   
 
VITAL SIGNS:   
Visit Vitals  /88 (BP 1 Location: Right arm, BP Patient Position: Sitting)  Pulse 85  Temp 98.4 °F (36.9 °C) (Oral)  Resp 18  Ht 6' 3\" (1.905 m)  Wt 127 kg (280 lb)  SpO2 98%  BMI 35 kg/m2 GENERAL: The patient is well developed, well nourished, and in no apparent distress. EXTREMITIES: No clubbing, cyanosis, or edema is identified. Pulses 2+ and symmetrical.  Muscle tone is normal. 
HEAD:   Ear, nose, and throat appear to be without trauma. The patient is normocephalic. NEUROLOGIC EXAMINATION 
 
MENTAL STATUS: The patient is awake, alert, and oriented x 4. Fund of knowledge is adequate. Speech is fluent and memory appears to be intact, both long and short term. CRANIAL NERVES: II  Visual fields are full to confrontation. Funduscopic examination reveals flat disks bilaterally. Pupils are both 5 mm and briskly reactive to light and accommodation. III, IV, VI  Extraocular movements are intact and there is no nystagmus. V  Facial sensation is depressed to pin and light touch in the lower lip moreso than the upper lip. VII  Face is symmetrical.  
VIII - Hearing is present. IX, X, [address] rises symmetrically. Gag is present. Tongue is in the midline. XI - Shoulder shrugging and head turning intact MOTOR:  The patient is 5/5 in all four limbs without any drift. Fine finger movements are symmetrical.  Isolated motor group testing reveals no focal abnormalities.   Tone is normal.  Sensory examination is intact to pinprick, light touch and position sense testing. Reflexes are 2+ and symmetrical. Plantars are down going. Cerebellar examination reveals no gross ataxia or dysmetria. Gait is normal and the patient can tandem walk without any difficulty. CBC:  
Lab Results Component Value Date/Time WBC 3.9 (L) 09/30/2017 09:22 AM  
 RBC 5.17 09/30/2017 09:22 AM  
 HGB 14.4 09/30/2017 09:22 AM  
 HCT 42.2 09/30/2017 09:22 AM  
 PLATELET 846 87/70/3919 09:22 AM  
 
BMP:  
Lab Results Component Value Date/Time Glucose 99 09/30/2017 09:22 AM  
 Glucose 95 11/25/2011 08:07 AM  
 Sodium 138 09/30/2017 09:22 AM  
 Potassium 4.4 09/30/2017 09:22 AM  
 Chloride 103 09/30/2017 09:22 AM  
 CO2 30 09/30/2017 09:22 AM  
 BUN 12 09/30/2017 09:22 AM  
 Creatinine 1.25 09/30/2017 09:22 AM  
 Calcium 9.3 09/30/2017 09:22 AM  
 
CMP:  
Lab Results Component Value Date/Time Glucose 99 09/30/2017 09:22 AM  
 Glucose 95 11/25/2011 08:07 AM  
 Sodium 138 09/30/2017 09:22 AM  
 Potassium 4.4 09/30/2017 09:22 AM  
 Chloride 103 09/30/2017 09:22 AM  
 CO2 30 09/30/2017 09:22 AM  
 BUN 12 09/30/2017 09:22 AM  
 Creatinine 1.25 09/30/2017 09:22 AM  
 Calcium 9.3 09/30/2017 09:22 AM  
 Anion gap 5 09/30/2017 09:22 AM  
 BUN/Creatinine ratio 10 (L) 09/30/2017 09:22 AM  
 Alk. phosphatase 53 09/30/2017 09:22 AM  
 Protein, total 7.8 09/30/2017 09:22 AM  
 Albumin 4.0 09/30/2017 09:22 AM  
 Globulin 3.8 09/30/2017 09:22 AM  
 A-G Ratio 1.1 09/30/2017 09:22 AM  
 
Coagulation: No results found for: PTP, INR, APTT, PTTT Cardiac markers: No results found for: CPK, CKND1, MACKENZIE Impression: Perioral numbness in this man who has risk factors including hypertension, diabetes, hypercholesterolemia. It is classic neurologic teaching the perioral numbness may be a small anushka-pontine brainstem stroke. Certainly that is a possibility in this man with numerous risk factors. Otherwise he has a completely normal examination. Plan: Will require a CAT scan of the brain. Unfortunately this man is extraordinarily claustrophobic requiring general anesthesia the last time he had an MRI scan. I do not think it is necessary to prove that he has had a stroke in that amount to suggest that he go on aspirin therapy anyway. If anything this is a small vessel stroke and he does not need to be on more than just an aspirin for stroke prevention. He is already on numerous medications for stroke prevention and risk factor reduction. We will see him back in a several months after the CAT scan is obtained. Thank you for allowing me to evaluate this patient. PLEASE NOTE:  
This document has been produced using voice recognition software. Unrecognized errors in transcription may be present. If you have questions, please do not hesitate to call me. I look forward to following Mr. Solorio along with you.  
 
 
 
Sincerely, 
 
 
Jerry Lerma MD

## 2018-02-13 NOTE — PROGRESS NOTES
Flavia Garg is a 62 y.o., right handed male, with an established history of hypertension, diabetes and hypercholesterolemia, who was well till late December of last year till he started to feel numbness and tingling of the upper and lower lips. This seems to have man about the same since onset till about 2 weeks ago when he noticed this start to get better. There was no rash in the region, he just feels funny around his mouth. There's been no weakness or numbness anywhere else. He denies any facial asymmetry. There's been no vision changes or language changes, he's just had numbness around the lips. Social History; he's , lives with his son. He denies smoking, drinking or the use of illicit drugs. 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 Family History; mother living age 80, has hypertension. Father  from stroke, had hypertension, diabetes. Sister with diabetes. Brother with an aneurysm bleed. Current Outpatient Prescriptions   Medication Sig Dispense Refill    multivitamin (ONE A DAY) tablet Take 1 Tab by mouth daily.  ascorbic acid, vitamin C, (VITAMIN C) 250 mg tablet Take  by mouth. Pt unsure of dosage      OTHER Vitamin A supplement      glucose blood VI test strips (ONETOUCH VERIO) strip Patient checks blood sugar daily, Dx E11.9 100 Strip 3    glucose blood VI test strips (ONETOUCH ULTRA TEST) strip Patient to test Blood sugar 1 a day DX: E11.65 100 Strip 3    valACYclovir (VALTREX) 1 gram tablet Take 1 Tab by mouth daily. 90 Tab 3    ezetimibe (ZETIA) 10 mg tablet Take 1 Tab by mouth daily. 30 Tab 11    metFORMIN ER (GLUCOPHAGE XR) 500 mg tablet Take 2 Tabs by mouth daily (with dinner). 90 Tab 3    cabergoline (DOSTINEX) 0.5 mg tablet Take 1 Tab by mouth every Monday. 12 Tab 3    omeprazole (PRILOSEC) 20 mg capsule Take 1 Cap by mouth daily. 90 Cap 3    meloxicam (MOBIC) 15 mg tablet Take 1 Tab by mouth daily.  90 Tab 3    atorvastatin (LIPITOR) 80 mg tablet Take 1 Tab by mouth daily. 90 Tab 3    amLODIPine-benazepril (LOTREL) 5-20 mg per capsule Take 1 Cap by mouth daily. 90 Cap 3    tadalafil (CIALIS) 20 mg tablet Take 1 Tab by mouth as needed. 27 Tab 11       Past Medical History:   Diagnosis Date    Allergic rhinitis     Deviated septum and epistaxis Dr. Paul Argueta 2009     Diabetes mellitus ([facility] [address]     on basis of elev a1c 12/15    ED (erectile dysfunction)     Fatty liver     on US 6/10; Fib-4 was 0.91 from 5/12    H. pylori infection     EGD w dilation Dr Peggy Langley 3/15    HSV (herpes simplex virus) infection     Hyperlipidemia     Hypertension     Obesity     peak 292 lbs, bmi 37.8 from 2/14    Prediabetes 3/10    2 hr GTT nl     Prolactinoma (HCC)     Dr. 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 9) E66.9    Erectile dysfunction N52.9         Review of Systems:   As above otherwise 11 point review of systems negative including;   Constitutional no fever or chills  Skin denies rash or itching  HENT  Denies tinnitus, hearing lose  Eyes denies diplopia vision lose  Respiratory denies shortness of breath  Cardiovascular denies chest pain, dyspnea on exertion  Gastrointestinal denies nausea, vomiting, diarrhea, constipation  Genitourinary denies incontinence  Musculoskeletal denies joint pain or swelling  Endocrine denies weight change  Hematology denies easy bruising or bleeding   Neurological as above in HPI      PHYSICAL EXAMINATION:      VITAL SIGNS:    Visit Vitals    /88 (BP 1 Location: Right arm, BP Patient Position: Sitting)    Pulse 85    Temp 98.4 °F (36.9 °C) (Oral)    Resp 18    Ht 6' 3\" (1.905 m)    Wt 127 kg (280 lb)    SpO2 98%    BMI 35 kg/m2       GENERAL: The patient is well developed, well nourished, and in no apparent distress. EXTREMITIES: No clubbing, cyanosis, or edema is identified. Pulses 2+ and symmetrical.  Muscle tone is normal.  HEAD:   Ear, nose, and throat appear to be without trauma. The patient is normocephalic. NEUROLOGIC EXAMINATION    MENTAL STATUS: The patient is awake, alert, and oriented x 4. Fund of knowledge is adequate. Speech is fluent and memory appears to be intact, both long and short term. CRANIAL NERVES: II - Visual fields are full to confrontation. Funduscopic examination reveals flat disks bilaterally. Pupils are both 5 mm and briskly reactive to light and accommodation. III, IV, VI - Extraocular movements are intact and there is no nystagmus. V - Facial sensation is depressed to pin and light touch in the lower lip moreso than the upper lip. VII - Face is symmetrical.   VIII - Hearing is present. IX, X, XII- Palate rises symmetrically. Gag is present. Tongue is in the midline. XI - Shoulder shrugging and head turning intact  MOTOR:  The patient is 5/5 in all four limbs without any drift. Fine finger movements are symmetrical.  Isolated motor group testing reveals no focal abnormalities. Tone is normal.  Sensory examination is intact to pinprick, light touch and position sense testing. Reflexes are 2+ and symmetrical. Plantars are down going. Cerebellar examination reveals no gross ataxia or dysmetria. Gait is normal and the patient can tandem walk without any difficulty.        CBC:   Lab Results   Component Value Date/Time    WBC 3.9 (L) 09/30/2017 09:22 AM    RBC 5.17 09/30/2017 09:22 AM    HGB 14.4 09/30/2017 09:22 AM    HCT 42.2 09/30/2017 09:22 AM    PLATELET 562 95/80/9904 09:22 AM     BMP:   Lab Results   Component Value Date/Time    Glucose 99 09/30/2017 09:22 AM    Glucose 95 11/25/2011 08:07 AM    Sodium 138 09/30/2017 09:22 AM    Potassium 4.4 09/30/2017 09:22 AM    Chloride 103 09/30/2017 09:22 AM    CO2 30 09/30/2017 09:22 AM    BUN 12 09/30/2017 09:22 AM    Creatinine 1.25 09/30/2017 09:22 AM    Calcium 9.3 09/30/2017 09:22 AM     CMP:   Lab Results   Component Value Date/Time    Glucose 99 09/30/2017 09:22 AM    Glucose 95 11/25/2011 08:07 AM    Sodium 138 09/30/2017 09:22 AM    Potassium 4.4 09/30/2017 09:22 AM    Chloride 103 09/30/2017 09:22 AM    CO2 30 09/30/2017 09:22 AM    BUN 12 09/30/2017 09:22 AM    Creatinine 1.25 09/30/2017 09:22 AM    Calcium 9.3 09/30/2017 09:22 AM    Anion gap 5 09/30/2017 09:22 AM    BUN/Creatinine ratio 10 (L) 09/30/2017 09:22 AM    Alk. phosphatase 53 09/30/2017 09:22 AM    Protein, total 7.8 09/30/2017 09:22 AM    Albumin 4.0 09/30/2017 09:22 AM    Globulin 3.8 09/30/2017 09:22 AM    A-G Ratio 1.1 09/30/2017 09:22 AM     Coagulation: No results found for: PTP, INR, APTT, PTTT  Cardiac markers: No results found for: CPK, CKND1, MACKENZIE       Impression: Perioral numbness in this man who has risk factors including hypertension, diabetes, hypercholesterolemia. It is classic neurologic teaching the perioral numbness may be a small anushka-pontine brainstem stroke. Certainly that is a possibility in this man with numerous risk factors. Otherwise he has a completely normal examination. Plan: Will require a CAT scan of the brain. Unfortunately this man is extraordinarily claustrophobic requiring general anesthesia the last time he had an MRI scan. I do not think it is necessary to prove that he has had a stroke in that amount to suggest that he go on aspirin therapy anyway. If anything this is a small vessel stroke and he does not need to be on more than just an aspirin for stroke prevention.   He is already on numerous medications for stroke prevention and risk factor reduction. We will see him back in a several months after the CAT scan is obtained. Thank you for allowing me to evaluate this patient. PLEASE NOTE:   This document has been produced using voice recognition software. Unrecognized errors in transcription may be present.

## 2018-02-13 NOTE — COMMUNICATION BODY
Jorge Biggs is a 62 y.o., right handed male, with an established history of hypertension, diabetes and hypercholesterolemia, who was well till late December of last year till he started to feel numbness and tingling of the upper and lower lips. This seems to have man about the same since onset till about 2 weeks ago when he noticed this start to get better. There was no rash in the region, he just feels funny around his mouth. There's been no weakness or numbness anywhere else. He denies any facial asymmetry. There's been no vision changes or language changes, he's just had numbness around the lips. Social History; he's , lives with his son. He denies smoking, drinking or the use of illicit drugs. 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 Family History; mother living age 80, has hypertension. Father  from stroke, had hypertension, diabetes. Sister with diabetes. Brother with an aneurysm bleed. Current Outpatient Prescriptions   Medication Sig Dispense Refill    multivitamin (ONE A DAY) tablet Take 1 Tab by mouth daily.  ascorbic acid, vitamin C, (VITAMIN C) 250 mg tablet Take  by mouth. Pt unsure of dosage      OTHER Vitamin A supplement      glucose blood VI test strips (ONETOUCH VERIO) strip Patient checks blood sugar daily, Dx E11.9 100 Strip 3    glucose blood VI test strips (ONETOUCH ULTRA TEST) strip Patient to test Blood sugar 1 a day DX: E11.65 100 Strip 3    valACYclovir (VALTREX) 1 gram tablet Take 1 Tab by mouth daily. 90 Tab 3    ezetimibe (ZETIA) 10 mg tablet Take 1 Tab by mouth daily. 30 Tab 11    metFORMIN ER (GLUCOPHAGE XR) 500 mg tablet Take 2 Tabs by mouth daily (with dinner). 90 Tab 3    cabergoline (DOSTINEX) 0.5 mg tablet Take 1 Tab by mouth every Monday. 12 Tab 3    omeprazole (PRILOSEC) 20 mg capsule Take 1 Cap by mouth daily. 90 Cap 3    meloxicam (MOBIC) 15 mg tablet Take 1 Tab by mouth daily.  90 Tab 3    atorvastatin (LIPITOR) 80 mg tablet Take 1 Tab by mouth daily. 90 Tab 3    amLODIPine-benazepril (LOTREL) 5-20 mg per capsule Take 1 Cap by mouth daily. 90 Cap 3    tadalafil (CIALIS) 20 mg tablet Take 1 Tab by mouth as needed. 27 Tab 11       Past Medical History:   Diagnosis Date    Allergic rhinitis     Deviated septum and epistaxis Dr. Paul Argueta 2009     Diabetes mellitus ([facility] [address]     on basis of elev a1c 12/15    ED (erectile dysfunction)     Fatty liver     on US 6/10; Fib-4 was 0.91 from 5/12    H. pylori infection     EGD w dilation Dr Peggy Langley 3/15    HSV (herpes simplex virus) infection     Hyperlipidemia     Hypertension     Obesity     peak 292 lbs, bmi 37.8 from 2/14    Prediabetes 3/10    2 hr GTT nl     Prolactinoma (HCC)     Dr. 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 9) E66.9    Erectile dysfunction N52.9         Review of Systems:   As above otherwise 11 point review of systems negative including;   Constitutional no fever or chills  Skin denies rash or itching  HENT  Denies tinnitus, hearing lose  Eyes denies diplopia vision lose  Respiratory denies shortness of breath  Cardiovascular denies chest pain, dyspnea on exertion  Gastrointestinal denies nausea, vomiting, diarrhea, constipation  Genitourinary denies incontinence  Musculoskeletal denies joint pain or swelling  Endocrine denies weight change  Hematology denies easy bruising or bleeding   Neurological as above in HPI      PHYSICAL EXAMINATION:      VITAL SIGNS:    Visit Vitals    /88 (BP 1 Location: Right arm, BP Patient Position: Sitting)    Pulse 85    Temp 98.4 °F (36.9 °C) (Oral)    Resp 18    Ht 6' 3\" (1.905 m)    Wt 127 kg (280 lb)    SpO2 98%    BMI 35 kg/m2       GENERAL: The patient is well developed, well nourished, and in no apparent distress. EXTREMITIES: No clubbing, cyanosis, or edema is identified. Pulses 2+ and symmetrical.  Muscle tone is normal.  HEAD:   Ear, nose, and throat appear to be without trauma. The patient is normocephalic. NEUROLOGIC EXAMINATION    MENTAL STATUS: The patient is awake, alert, and oriented x 4. Fund of knowledge is adequate. Speech is fluent and memory appears to be intact, both long and short term. CRANIAL NERVES: II - Visual fields are full to confrontation. Funduscopic examination reveals flat disks bilaterally. Pupils are both 5 mm and briskly reactive to light and accommodation. III, IV, VI - Extraocular movements are intact and there is no nystagmus. V - Facial sensation is depressed to pin and light touch in the lower lip moreso than the upper lip. VII - Face is symmetrical.   VIII - Hearing is present. IX, X, XII- Palate rises symmetrically. Gag is present. Tongue is in the midline. XI - Shoulder shrugging and head turning intact  MOTOR:  The patient is 5/5 in all four limbs without any drift. Fine finger movements are symmetrical.  Isolated motor group testing reveals no focal abnormalities. Tone is normal.  Sensory examination is intact to pinprick, light touch and position sense testing. Reflexes are 2+ and symmetrical. Plantars are down going. Cerebellar examination reveals no gross ataxia or dysmetria. Gait is normal and the patient can tandem walk without any difficulty.        CBC:   Lab Results   Component Value Date/Time    WBC 3.9 (L) 09/30/2017 09:22 AM    RBC 5.17 09/30/2017 09:22 AM    HGB 14.4 09/30/2017 09:22 AM    HCT 42.2 09/30/2017 09:22 AM    PLATELET 498 02/42/0879 09:22 AM     BMP:   Lab Results   Component Value Date/Time    Glucose 99 09/30/2017 09:22 AM    Glucose 95 11/25/2011 08:07 AM    Sodium 138 09/30/2017 09:22 AM    Potassium 4.4 09/30/2017 09:22 AM    Chloride 103 09/30/2017 09:22 AM    CO2 30 09/30/2017 09:22 AM    BUN 12 09/30/2017 09:22 AM    Creatinine 1.25 09/30/2017 09:22 AM    Calcium 9.3 09/30/2017 09:22 AM     CMP:   Lab Results   Component Value Date/Time    Glucose 99 09/30/2017 09:22 AM    Glucose 95 11/25/2011 08:07 AM    Sodium 138 09/30/2017 09:22 AM    Potassium 4.4 09/30/2017 09:22 AM    Chloride 103 09/30/2017 09:22 AM    CO2 30 09/30/2017 09:22 AM    BUN 12 09/30/2017 09:22 AM    Creatinine 1.25 09/30/2017 09:22 AM    Calcium 9.3 09/30/2017 09:22 AM    Anion gap 5 09/30/2017 09:22 AM    BUN/Creatinine ratio 10 (L) 09/30/2017 09:22 AM    Alk. phosphatase 53 09/30/2017 09:22 AM    Protein, total 7.8 09/30/2017 09:22 AM    Albumin 4.0 09/30/2017 09:22 AM    Globulin 3.8 09/30/2017 09:22 AM    A-G Ratio 1.1 09/30/2017 09:22 AM     Coagulation: No results found for: PTP, INR, APTT, PTTT  Cardiac markers: No results found for: CPK, CKND1, MACKENZIE       Impression: Perioral numbness in this man who has risk factors including hypertension, diabetes, hypercholesterolemia. It is classic neurologic teaching the perioral numbness may be a small anushka-pontine brainstem stroke. Certainly that is a possibility in this man with numerous risk factors. Otherwise he has a completely normal examination. Plan: Will require a CAT scan of the brain. Unfortunately this man is extraordinarily claustrophobic requiring general anesthesia the last time he had an MRI scan. I do not think it is necessary to prove that he has had a stroke in that amount to suggest that he go on aspirin therapy anyway. If anything this is a small vessel stroke and he does not need to be on more than just an aspirin for stroke prevention.   He is already on numerous medications for stroke prevention and risk factor reduction. We will see him back in a several months after the CAT scan is obtained. Thank you for allowing me to evaluate this patient. PLEASE NOTE:   This document has been produced using voice recognition software. Unrecognized errors in transcription may be present.

## 2018-02-13 NOTE — MR AVS SNAPSHOT
[address] [facility] 
 
 
 [address] [facility] [address] 
[phone] Patient: Alvaro Lugo MRN: [id number] Visit Information Date & Time Provider Department Dept. Phone Encounter #  
 2/13/2018  3:00 PM Ricardo Anderson MD [facility] at [address] [facility] [id number] Follow-up Instructions Return in about 3 months (around 5/13/2018). Your Appointments 4/6/2018  3:00 PM  
PHYSICAL with Joel Vogel MD  
[facility] [address] [facility] [address] [facility]) Appt Note: [address]  
 [address] [facility] [address], [address] [place]  
  
   
 [address], [address] 5/15/2018  2:40 PM  
Follow Up with Ricardo Anderson MD  
[facility] at [address] [address] [facility]) Appt Note: 3 month fu  CT Head  
 [address] [facility] [address] [facility] [address] [facility] Upcoming Health Maintenance Date Due Hepatitis C Screening 1960 FOOT EXAM Q1 2/1/1970 EYE EXAM RETINAL OR DILATED Q1 11/7/2017 HEMOGLOBIN A1C Q6M 3/30/2018 MICROALBUMIN Q1 9/30/2018 LIPID PANEL Q1 9/30/2018 DTaP/Tdap/Td series (5 - Td) 5/22/2022 COLONOSCOPY 6/22/2022 Allergies as of 2/13/2018  Review Complete On: 2/13/2018 By: Robert Lee LPN Severity Noted Reaction Type Reactions Hydrochlorothiazide    Other (comments) Weight loss, pt unsure of allergy Current Immunizations  Reviewed on 9/28/2017 Name Date DTaP 8/19/2009, 4/23/2002, 6/14/1998, 6/12/1998, 4/16/1998, 2/13/1998 HPV 9/15/2015, 9/5/2014 Hep A Vaccine 9/3/2013, 8/24/2012 Hep B Vaccine 6/12/1998, 1/12/1998, 12/12/1997 Hib 3/18/1999, 6/12/1998, 2/13/1998 IPV 4/23/2002, 6/14/1999, 4/16/1998, 2/13/1998 Influenza Vaccine 9/15/2015, 10/14/2014, 10/18/2013 Influenza Vaccine (Quad) PF 9/29/2017  3:10 PM  
 Influenza Vaccine Whole 11/19/2011 MMR 4/23/2002, 12/15/1998 Meningococcal ACWY Vaccine 9/5/2014, 8/19/2009 Pneumococcal Polysaccharide (PPSV-23) 9/29/2017  3:10 PM  
 TDAP Vaccine 5/22/2012 Zoster 5/22/2012 Zoster Vaccine, Live 8/15/2008, 12/15/1998 Not reviewed this visit You Were Diagnosed With   
  
 Codes Comments Perioral numbness    -  Primary ICD-10-CM: R20.0 ICD-9-CM: 770. 0 Vitals BP Pulse Temp Resp Height(growth percentile) Weight(growth percentile) 162/88 (BP 1 Location: Right arm, BP Patient Position: Sitting) 85 98.4 °F (36.9 °C) (Oral) 18 6' 3\" (1.905 m) 280 lb (127 kg) SpO2 BMI Smoking Status 98% 35 kg/m2 Never Smoker Vitals History BMI and BSA Data Body Mass Index Body Surface Area 35 kg/m 2 2.59 m 2 Preferred Pharmacy Pharmacy Name Phone [facility], [address] [place] [address] [phone] Your Updated Medication List  
  
   
This list is accurate as of: 2/13/18  3:39 PM.  Always use your most recent med list. amLODIPine-benazepril 5-20 mg per capsule Commonly known as:  Fay Plume Take 1 Cap by mouth daily. atorvastatin 80 mg tablet Commonly known as:  LIPITOR Take 1 Tab by mouth daily. cabergoline 0.5 mg tablet Commonly known as:  DOSTINEX Take 1 Tab by mouth every Monday.  
  
 ezetimibe 10 mg tablet Commonly known as:  Casimer Holts Take 1 Tab by mouth daily. * glucose blood VI test strips strip Commonly known as:  ONETOUCH ULTRA TEST Patient to test Blood sugar 1 a day DX: E11.65  
  
 * glucose blood VI test strips strip Commonly known as:  Carlosye Ax Patient checks blood sugar daily, Dx E11.9  
  
 meloxicam 15 mg tablet Commonly known as:  MOBIC Take 1 Tab by mouth daily. metFORMIN  mg tablet Commonly known as:  GLUCOPHAGE XR  
 Take 2 Tabs by mouth daily (with dinner). multivitamin tablet Commonly known as:  ONE A DAY Take 1 Tab by mouth daily. omeprazole 20 mg capsule Commonly known as:  PRILOSEC Take 1 Cap by mouth daily. OTHER Vitamin A supplement  
  
 tadalafil 20 mg tablet Commonly known as:  CIALIS Take 1 Tab by mouth as needed. valACYclovir 1 gram tablet Commonly known as:  VALTREX Take 1 Tab by mouth daily. VITAMIN C 250 mg tablet Generic drug:  ascorbic acid (vitamin C) Take  by mouth. Pt unsure of dosage * Notice: This list has 2 medication(s) that are the same as other medications prescribed for you. Read the directions carefully, and ask your doctor or other care provider to review them with you. Follow-up Instructions Return in about 3 months (around 5/13/2018). To-Do List   
 02/13/2018 Imaging:  CT HEAD WO CONT Introducing [facility]! [employer] introduces [employer] patient portal. Now you can access parts of your medical record, email your doctor's office, and request medication refills online. 1. In your internet browser, go to [email or web address] 2. Click on the First Time User? Click Here link in the Sign In box. You will see the New Member Sign Up page. 3. Enter your V[employer] Access Code exactly as it appears below. You will not need to use this code after youve completed the sign-up process. If you do not sign up before the expiration date, you must request a new code. · V[employer] Access Code: [email or web address] Expires: 3/29/2018 11:13 AM 
 
4. Enter the last four digits of your Social Security Number (xxxx) and Date of Birth (mm/dd/yyyy) as indicated and click Submit. You will be taken to the next sign-up page. 5. Create a V[employer]gen ID. This will be your V[employer] login ID and cannot be changed, so think of one that is secure and easy to remember. 6. Create a u.sit password. You can change your password at any time. 7. Enter your Password Reset Question and Answer. This can be used at a later time if you forget your password. 8. Enter your e-mail address. You will receive e-mail notification when new information is available in [address] 9. Click Sign Up. You can now view and download portions of your medical record. 10. Click the Download Summary menu link to download a portable copy of your medical information. If you have questions, please visit the Frequently Asked Questions section of the u.sit website. Remember, u.sit is NOT to be used for urgent needs. For medical emergencies, dial 911. Now available from your iPhone and Android! Please provide this summary of care documentation to your next provider. Your primary care clinician is listed as Silvia Saldana. If you have any questions after today's visit, please call [phone].

## 2018-02-22 ENCOUNTER — HOSPITAL ENCOUNTER (OUTPATIENT)
Dept: CT IMAGING | Age: 58
Discharge: HOME | End: 2018-02-22
Attending: PSYCHIATRY & NEUROLOGY
Payer: COMMERCIAL

## 2018-02-22 DIAGNOSIS — R20.0 PERIORAL NUMBNESS: ICD-10-CM

## 2018-02-22 PROCEDURE — 70450 CT HEAD/BRAIN W/O DYE: CPT

## 2018-03-21 ENCOUNTER — DOCUMENTATION ONLY (OUTPATIENT)
Dept: INTERNAL MEDICINE CLINIC | Age: 58
End: 2018-03-21

## 2018-03-21 ENCOUNTER — OFFICE VISIT (OUTPATIENT)
Dept: INTERNAL MEDICINE CLINIC | Age: 58
End: 2018-03-21

## 2018-03-21 VITALS
BODY MASS INDEX: 34.82 KG/M2 | TEMPERATURE: 97.7 F | RESPIRATION RATE: 14 BRPM | HEIGHT: 75 IN | WEIGHT: 280 LBS | SYSTOLIC BLOOD PRESSURE: 148 MMHG | OXYGEN SATURATION: 98 % | HEART RATE: 70 BPM | DIASTOLIC BLOOD PRESSURE: 98 MMHG

## 2018-03-21 DIAGNOSIS — Z00.00 PHYSICAL EXAM: ICD-10-CM

## 2018-03-21 DIAGNOSIS — R20.0 PERIORAL NUMBNESS: ICD-10-CM

## 2018-03-21 DIAGNOSIS — E11.8 UNCONTROLLED TYPE 2 DIABETES MELLITUS WITH COMPLICATION, UNSPECIFIED LONG TERM INSULIN USE STATUS: ICD-10-CM

## 2018-03-21 DIAGNOSIS — E11.65 UNCONTROLLED TYPE 2 DIABETES MELLITUS WITH COMPLICATION, UNSPECIFIED LONG TERM INSULIN USE STATUS: ICD-10-CM

## 2018-03-21 DIAGNOSIS — E78.5 HYPERLIPIDEMIA, UNSPECIFIED HYPERLIPIDEMIA TYPE: Primary | ICD-10-CM

## 2018-03-21 NOTE — PROGRESS NOTES
1. Have you been to the ER, urgent care clinic or hospitalized since your last visit? NO.     2. Have you seen or consulted any other health care providers outside of the [address] [place] [address] since your last visit (Include any pap smears or colon screening)? NO      Do you have an Advanced Directive? NO    Would you like information on Advanced Directives? NO      Chief Complaint   Patient presents with    Medication Evaluation     Patient stated he may be having medication issues because he is having a tingaling sensation in lip.  Patient stated he stopped taking metformin for 2 weeks ago

## 2018-03-21 NOTE — PROGRESS NOTES
62 y.o. BLACK OR  male who presents for evaluation. He is here c/o of the perioral paresthesias. He had seen KIMBERLY Grande for this in December. W/u neg, ended up referring him to Dr Maricarmen Burdick and w/u for neuro cause negative including head CT. He does not think he's hyperventilating, he has hsv on valtrex suppression and there is no relationship to any visible lesions or dryness. He uses lip moisturizers frequently. On his own, he started experimenting and he thinks it's the metformin so he stopped it about 2 weeks ago.   However, he had more episodes this morning    Past Medical History:   Diagnosis Date    Allergic rhinitis     Deviated septum and epistaxis Dr. Melva Ellsworth 2009     Diabetes mellitus ([facility] [address]     on basis of elev a1c 12/15    ED (erectile dysfunction)     Fatty liver     on US 6/10; Fib-4 was 0.91 from 5/12    H. pylori infection     EGD w dilation Dr Brandi Delgado 3/15    HSV (herpes simplex virus) infection     Hyperlipidemia     Hypertension     Obesity     peak 292 lbs, bmi 37.8 from 2/14    Prediabetes 3/10    2 hr GTT nl     Prolactinoma (HCC)     Dr. Lennox Salon      Past Surgical History:   Procedure Laterality Date   [place]      Dr. Ginette Garcia 6/12 negative    HX GI  2/01    negative barium enema    HX ORTHOPAEDIC  11/2015    medial meniscus tear left knee Dr Sushma Hussein History    Marital status:      Spouse name: N/A    Number of children: 2    Years of education: N/A     Occupational History     NG      Social History Main Topics    Smoking status: Never Smoker    Smokeless tobacco: Never Used    Alcohol use No    Drug use: No    Sexual activity: Not on file     Other Topics Concern    Not on file     Social History Narrative     Allergies   Allergen Reactions    Hydrochlorothiazide Other (comments)     Weight loss, pt unsure of allergy       Current Outpatient Prescriptions   Medication Sig    multivitamin (ONE A DAY) tablet Take 1 Tab by mouth daily.  ascorbic acid, vitamin C, (VITAMIN C) 250 mg tablet Take  by mouth. Pt unsure of dosage    OTHER Vitamin A supplement    glucose blood VI test strips (ONETOUCH VERIO) strip Patient checks blood sugar daily, Dx E11.9    glucose blood VI test strips (ONETOUCH ULTRA TEST) strip Patient to test Blood sugar 1 a day DX: E11.65    valACYclovir (VALTREX) 1 gram tablet Take 1 Tab by mouth daily.  ezetimibe (ZETIA) 10 mg tablet Take 1 Tab by mouth daily.  cabergoline (DOSTINEX) 0.5 mg tablet Take 1 Tab by mouth every Monday.  tadalafil (CIALIS) 20 mg tablet Take 1 Tab by mouth as needed.  metFORMIN ER (GLUCOPHAGE XR) 500 mg tablet Take 2 Tabs by mouth daily (with dinner).  omeprazole (PRILOSEC) 20 mg capsule Take 1 Cap by mouth daily.  meloxicam (MOBIC) 15 mg tablet Take 1 Tab by mouth daily.  atorvastatin (LIPITOR) 80 mg tablet Take 1 Tab by mouth daily.  amLODIPine-benazepril (LOTREL) 5-20 mg per capsule Take 1 Cap by mouth daily. No current facility-administered medications for this visit.       REVIEW OF SYSTEMS: colo 6/12 Dr Brendon Nazario  Ophtho - no vision change or eye pain  Oral - no mouth pain, tongue or tooth problems  Ears - no hearing loss, ear pain, fullness, no swallowing problems  Cardiac - no CP, PND, orthopnea, edema, palpitations or syncope  Chest - no breast masses  Resp - no wheezing, chronic coughing, dyspnea  GI - no heartburn, nausea, vomiting, change in bowel habits, bleeding, hemorrhoids  Urinary - no dysuria, hematuria, flank pain, urgency, frequency  Constitutional - no wt loss, night sweats, unexplained fevers  Neuro - no focal weakness, numbness, paresthesias, incoordination, ataxia, involuntary movements  Endo - no polyuria, polydipsia, nocturia, hot flashes    Visit Vitals    BP (!) 148/98 (BP 1 Location: Left arm, BP Patient Position: Sitting)    Pulse 70    Temp 97.7 °F (36.5 °C) (Oral)    Resp 14    Ht 6' 3\" (1.905 m)    Wt 280 lb (127 kg)    SpO2 98%    BMI 35 kg/m2     A&O x3  Affect is appropriate. Mood stable  No apparent distress  Anicteric, no JVD, adenopathy or thyromegaly. No carotid bruits or radiated murmur  Lungs clear to auscultation, no wheezes or rales  Heart showed regular rate and rhythm. No murmur, rubs, gallops  Abdomen soft nontender, no hepatosplenomegaly or masses. Extremities without edema.   Pulses 1-2+ symmetrically    LABS  From 2/10 showed    gluc 111, cr 1.10, gfr>60, alt 51, hba1c 6.0,                   chol 278, tg 122,  hdl 56, ldl-c 198, wbc 3.2, hb 13.8, plt 230, psa 0.60  From 3/10 showed                                 2hr GTT 88  From 6/10 showed    gluc 105, cr 1.30, gfr>60, alt 59,                              wbc 3.6, hb 13.9, plt 237  From 10/11 showed  gluc 102, cr 1.18, gfr 82,  alt 22, hba1c 6.5,                             wbc 3.5, hb 13.5, plt 255, psa 1.20,  prl 30.8  From 11/11 showed                ldl-p 1245,                        2 hr GTT 89  From 2/12 showed                                ua neg  From 5/12 showed    gluc 109, cr 1.10, gfr>60, alt 17, hba1c 6.1,                   chol 224, tg 70,   hdl 70, ldl-c 140, wbc 3.8, hb 14.3, plt 250, psa 1.08  From 10/12 showed  gluc 98,   cr 1.00, gfr>60, alt 23, hba1c 6.2, ldl-p 1647, chol 216, tg 64,   hdl 63, ldl-c 148  From 1/14 showed    gluc 99,    cr 1.30, gfr 58,  alt 31, hba1c 6.4,      chol 196, tg 81,   hdl 67, ldl-c 113  From 8/14 showed    gluc 100,  cr 1.20, gfr>60, alt 19, hba1c 6.2,      chol 288, tg 140, hdl 69, ldl-c 191         umar neg  From 9/14 showed                       tsh 1.84  From 2/15 showed    gluc 118, cr 1.34, gfr 68,  alt 19, hba1c 6.4,      chol 301, tg 190, hdl 63, ldl-c 200,         umar neg   From 8/15 showed         hba1c 6.3,      chol 267, tg 90,   hdl 70, ldl-c 179  From 12/15 showed  gluc 102, cr 1.29, gfr>60,     hba1c 6.8,               umar 2  From 3/16 showed    gluc 96,   cr 1.23, gfr>60,                   wbc 4.1, hb 13.3, plt 240  From 10/16 showed  gluc 110, cr 1.13, gfr>60, alt 29, hba1c 6.4,      chol 233, tg 120, hdl 73, ldl-c 136  From 9/17 showed    gluc 99,   cr 1.25, gfr>60, alt 29, hba1c 6.5,      chol 240, tg 93,   hdl 79, ldl-c 142, wbc 3.9, hb 14.4, plt 217, umar neg    Patient Active Problem List   Diagnosis Code    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 9) E66.9    Erectile dysfunction N52.9    Perioral numbness R20.0     Assessment and plan:  1. Ortho. F/U Dr César Chino  2. Obesity. Calorie counting, lifestyle and dietary measures reiterated, previously declined appetite supp  3. DM. Check labs, f/u ophth. He is holding metformin for now although I'm doubtful this is the cause  4. Erectile dysfunction. Prn meds  5. Prolactinoma. Continue dostinex  6. HLP. Check labs  7. HTN. Had not taken his meds yet but his last few readings have been elev. Check labs and adjust at the next encounter if needed in early April  8. Perioral numbness. Doubt it's related to metformin . He will try to treat as hyperventilation to see if that's the cause. Will send to ENT and consult Pharm[facility] regarding other meds. He's been on all the other meds for years without issues      RTC 4/18    Above conditions discussed at length and patient vocalized understanding.   All questions answered to patient satifaction

## 2018-03-21 NOTE — PROGRESS NOTES
Pharmacy Note - Medication Review   03/21/18       Patient name: Jj Oro (44 y.o., male)  YOB: 1960    Referred by: Dr. Corinne Perez for medication side effects     Past Medical History:   Diagnosis Date    Allergic rhinitis     Deviated septum and epistaxis Dr. Nella Quiroz 2009     Diabetes mellitus [facility])     on basis of elev a1c 12/15    ED (erectile dysfunction)     Fatty liver     on US 6/10; Fib-4 was 0.91 from 5/12    H. pylori infection     EGD w dilation Dr Rebekah Plascencia 3/15    HSV (herpes simplex virus) infection     Hyperlipidemia     Hypertension     Obesity     peak 292 lbs, bmi 37.8 from 2/14    Prediabetes 3/10    2 hr GTT nl     Prolactinoma ([facility] [address]     Dr. Guido Webster      Allergies   Allergen Reactions    Hydrochlorothiazide Other (comments)     Weight loss, pt unsure of allergy       Subjective / Objective      Current Outpatient Prescriptions   Medication Sig    multivitamin (ONE A DAY) tablet Take 1 Tab by mouth daily.  ascorbic acid, vitamin C, (VITAMIN C) 250 mg tablet Take  by mouth. Pt unsure of dosage    OTHER Vitamin A supplement    glucose blood VI test strips (ONETOUCH VERIO) strip Patient checks blood sugar daily, Dx E11.9    glucose blood VI test strips (ONETOUCH ULTRA TEST) strip Patient to test Blood sugar 1 a day DX: E11.65    valACYclovir (VALTREX) 1 gram tablet Take 1 Tab by mouth daily.  tadalafil (CIALIS) 20 mg tablet Take 1 Tab by mouth as needed.  ezetimibe (ZETIA) 10 mg tablet Take 1 Tab by mouth daily.  metFORMIN ER (GLUCOPHAGE XR) 500 mg tablet Take 2 Tabs by mouth daily (with dinner).  cabergoline (DOSTINEX) 0.5 mg tablet Take 1 Tab by mouth every Monday.  omeprazole (PRILOSEC) 20 mg capsule Take 1 Cap by mouth daily.  meloxicam (MOBIC) 15 mg tablet Take 1 Tab by mouth daily.  atorvastatin (LIPITOR) 80 mg tablet Take 1 Tab by mouth daily.  amLODIPine-benazepril (LOTREL) 5-20 mg per capsule Take 1 Cap by mouth daily. No current facility-administered medications for this visit. Assessment / Plan      1. Asked by Dr. Yue Parker to evaluate patient's medication list for possible medications that could potentially cause the patient's recent issues with perioral paresthesias. Medications with listed side effects of paresthesias include cabergoline (incidence listed as 1-2%) and tadalafil (incidence < 2%). Several other medications have paresthesias listed under <1% which include: amlodipine, benazepril, atorvastatin, and ezetimibe. Another possible cause could potentially be related to a vitamin B12 deficiency related to using metformin which has a listed incidence of around 7%. Would potentially suggest ruling out a vitamin deficiency and seeing if patient is able to connect any intermittent paresthesias to the use of the tadalafil as this is an as needed medication compared to the cabergoline which is daily. Please let me know if I could assist in any other way with this patient.             Follow-up: Notifications of recommendations will be sent to Dr. Corinne Perez MD for review    Thank you for the consult,  Christ Ziegler, PharmD, BCPS, BCACP, BC-ADM

## 2018-03-21 NOTE — MR AVS SNAPSHOT
[address] [facility] 
 
 
 [address], [address] [facility] 
[phone] Patient: Esau Gross MRN: [id number] GVI:[id number] Visit Information Date & Time Provider Department Dept. Phone Encounter #  
 3/21/2018 10:20 AM Richard Moyer MD [facility] [id number] Your Appointments 4/6/2018  3:00 PM  
PHYSICAL with Richard Moyer MD  
[facility] [address] [place] [address]) Appt Note: [address]  
 [address] [facility] [address] [place] [address] [place]  
  
   
 [address], [address] 5/15/2018  2:40 PM  
Follow Up with Gaetano Simon MD  
[address] at [address] [address]) Appt Note: 3 month fu  CT Head  
 [address] [facility] [address] [facility] [address] [facility] [address] [facility] Upcoming Health Maintenance Date Due Hepatitis C Screening 1960 FOOT EXAM Q1 2/1/1970 EYE EXAM RETINAL OR DILATED Q1 11/7/2017 HEMOGLOBIN A1C Q6M 3/30/2018 MICROALBUMIN Q1 9/30/2018 LIPID PANEL Q1 9/30/2018 DTaP/Tdap/Td series (5 - Td) 5/22/2022 COLONOSCOPY 6/22/2022 Allergies as of 3/21/2018  Review Complete On: 3/21/2018 By: Cullen Gomez Severity Noted Reaction Type Reactions Hydrochlorothiazide    Other (comments) Weight loss, pt unsure of allergy Current Immunizations  Reviewed on 9/28/2017 Name Date DTaP 8/19/2009, 4/23/2002, 6/14/1998, 6/12/1998, 4/16/1998, 2/13/1998 HPV 9/15/2015, 9/5/2014 Hep A Vaccine 9/3/2013, 8/24/2012 Hep B Vaccine 6/12/1998, 1/12/1998, 12/12/1997 Hib 3/18/1999, 6/12/1998, 2/13/1998 IPV 4/23/2002, 6/14/1999, 4/16/1998, 2/13/1998 Influenza Vaccine 9/15/2015, 10/14/2014, 10/18/2013 Influenza Vaccine (Quad) PF 9/29/2017  3:10 PM  
 Influenza Vaccine Whole 11/19/2011 MMR 4/23/2002, 12/15/1998 Meningococcal ACWY Vaccine 9/5/2014, 8/19/2009 Pneumococcal Polysaccharide (PPSV-23) 9/29/2017  3:10 PM  
 TDAP Vaccine 5/22/2012 Zoster 5/22/2012 Zoster Vaccine, Live 8/15/2008, 12/15/1998 Not reviewed this visit Vitals BP Pulse Temp Resp Height(growth percentile) Weight(growth percentile) (!) 148/98 (BP 1 Location: Left arm, BP Patient Position: Sitting) 70 97.7 °F (36.5 °C) (Oral) 14 6' 3\" (1.905 m) 280 lb (127 kg) SpO2 BMI Smoking Status 98% 35 kg/m2 Never Smoker Vitals History BMI and BSA Data Body Mass Index Body Surface Area 35 kg/m 2 2.59 m 2 Preferred Pharmacy Pharmacy Name Phone [facility], [address] [place] [address] [phone] Your Updated Medication List  
  
   
This list is accurate as of 3/21/18 11:29 AM.  Always use your most recent med list. amLODIPine-benazepril 5-20 mg per capsule Commonly known as:  Virgene Herrlich Take 1 Cap by mouth daily. atorvastatin 80 mg tablet Commonly known as:  LIPITOR Take 1 Tab by mouth daily. cabergoline 0.5 mg tablet Commonly known as:  DOSTINEX Take 1 Tab by mouth every Monday.  
  
 ezetimibe 10 mg tablet Commonly known as:  Alis Generous Take 1 Tab by mouth daily. * glucose blood VI test strips strip Commonly known as:  ONETOUCH ULTRA TEST Patient to test Blood sugar 1 a day DX: E11.65  
  
 * glucose blood VI test strips strip Commonly known as:  Lucero Sanchez Patient checks blood sugar daily, Dx E11.9  
  
 meloxicam 15 mg tablet Commonly known as:  MOBIC Take 1 Tab by mouth daily. metFORMIN  mg tablet Commonly known as:  GLUCOPHAGE XR Take 2 Tabs by mouth daily (with dinner). multivitamin tablet Commonly known as:  ONE A DAY Take 1 Tab by mouth daily. omeprazole 20 mg capsule Commonly known as:  PRILOSEC Take 1 Cap by mouth daily. OTHER Vitamin A supplement  
  
 tadalafil 20 mg tablet Commonly known as:  CIALIS Take 1 Tab by mouth as needed. valACYclovir 1 gram tablet Commonly known as:  VALTREX Take 1 Tab by mouth daily. VITAMIN C 250 mg tablet Generic drug:  ascorbic acid (vitamin C) Take  by mouth. Pt unsure of dosage * Notice: This list has 2 medication(s) that are the same as other medications prescribed for you. Read the directions carefully, and ask your doctor or other care provider to review them with you. Introducing [facility]! Mayra Phelps introduces [employer] patient portal. Now you can access parts of your medical record, email your doctor's office, and request medication refills online. 1. In your internet browser, go to [email or web address] 2. Click on the First Time User? Click Here link in the Sign In box. You will see the New Member Sign Up page. 3. Enter your [employer] Access Code exactly as it appears below. You will not need to use this code after youve completed the sign-up process. If you do not sign up before the expiration date, you must request a new code. · [employer] Access Code: [email or web address] Expires: 3/29/2018 12:13 PM 
 
4. Enter the last four digits of your Social Security Number (xxxx) and Date of Birth (mm/dd/yyyy) as indicated and click Submit. You will be taken to the next sign-up page. 5. Create a [employer] ID. This will be your [employer] login ID and cannot be changed, so think of one that is secure and easy to remember. 6. Create a [employer] password. You can change your password at any time. 7. Enter your Password Reset Question and Answer. This can be used at a later time if you forget your password. 8. Enter your e-mail address. You will receive e-mail notification when new information is available in [address] 9. Click Sign Up. You can now view and download portions of your medical record. 10. Click the Download Summary menu link to download a portable copy of your medical information. If you have questions, please visit the Frequently Asked Questions section of the [employer] website. Remember, [employer] is NOT to be used for urgent needs. For medical emergencies, dial 911. Now available from your iPhone and Android! Please provide this summary of care documentation to your next provider. Your primary care clinician is listed as Keagan Spring. If you have any questions after today's visit, please call [phone].

## 2018-03-22 ENCOUNTER — HOSPITAL ENCOUNTER (OUTPATIENT)
Dept: LAB | Age: 58
Discharge: HOME | End: 2018-03-22
Payer: COMMERCIAL

## 2018-03-22 ENCOUNTER — TELEPHONE (OUTPATIENT)
Dept: INTERNAL MEDICINE CLINIC | Age: 58
End: 2018-03-22

## 2018-03-22 DIAGNOSIS — E11.8 UNCONTROLLED TYPE 2 DIABETES MELLITUS WITH COMPLICATION, UNSPECIFIED LONG TERM INSULIN USE STATUS: ICD-10-CM

## 2018-03-22 DIAGNOSIS — Z00.00 PHYSICAL EXAM: ICD-10-CM

## 2018-03-22 DIAGNOSIS — E11.65 UNCONTROLLED TYPE 2 DIABETES MELLITUS WITH COMPLICATION, UNSPECIFIED LONG TERM INSULIN USE STATUS: ICD-10-CM

## 2018-03-22 DIAGNOSIS — E78.5 HYPERLIPIDEMIA, UNSPECIFIED HYPERLIPIDEMIA TYPE: ICD-10-CM

## 2018-03-22 LAB
ALBUMIN SERPL-MCNC: 4.2 G/DL (ref 3.4–5)
ALBUMIN/GLOB SERPL: 1.1 {RATIO} (ref 0.8–1.7)
ALP SERPL-CCNC: 58 U/L (ref 45–117)
ALT SERPL-CCNC: 27 U/L (ref 16–61)
ANION GAP SERPL CALC-SCNC: 7 MMOL/L (ref 3–18)
AST SERPL-CCNC: 15 U/L (ref 15–37)
BILIRUB SERPL-MCNC: 0.7 MG/DL (ref 0.2–1)
BUN SERPL-MCNC: 14 MG/DL (ref 7–18)
BUN/CREAT SERPL: 11 (ref 12–20)
CALCIUM SERPL-MCNC: 10.1 MG/DL (ref 8.5–10.1)
CHLORIDE SERPL-SCNC: 103 MMOL/L (ref 100–108)
CHOLEST SERPL-MCNC: 251 MG/DL
CO2 SERPL-SCNC: 28 MMOL/L (ref 21–32)
CREAT SERPL-MCNC: 1.3 MG/DL (ref 0.6–1.3)
FOLATE SERPL-MCNC: 14.1 NG/ML (ref 3.1–17.5)
GLOBULIN SER CALC-MCNC: 3.8 G/DL (ref 2–4)
GLUCOSE SERPL-MCNC: 103 MG/DL (ref 74–99)
HBA1C MFR BLD: 6.3 % (ref 4.2–5.6)
HBV SURFACE AB SER QL IA: NEGATIVE
HBV SURFACE AB SERPL IA-ACNC: 5.46 MIU/ML
HBV SURFACE AG SER QL: <0.1 INDEX
HBV SURFACE AG SER QL: NEGATIVE
HCV AB SER IA-ACNC: 0.03 INDEX
HCV AB SERPL QL IA: NEGATIVE
HCV COMMENT,HCGAC: NORMAL
HDLC SERPL-MCNC: 88 MG/DL (ref 40–60)
HDLC SERPL: 2.9 {RATIO} (ref 0–5)
HEP BS AB COMMENT,HBSAC: ABNORMAL
HIV 1+2 AB+HIV1 P24 AG SERPL QL IA: NONREACTIVE
HIV12 RESULT COMMENT, HHIVC: NORMAL
LDLC SERPL CALC-MCNC: 143.8 MG/DL (ref 0–100)
LIPID PROFILE,FLP: ABNORMAL
POTASSIUM SERPL-SCNC: 4.3 MMOL/L (ref 3.5–5.5)
PROT SERPL-MCNC: 8 G/DL (ref 6.4–8.2)
PSA SERPL-MCNC: 3.7 NG/ML (ref 0–4)
RPR SER QL: NONREACTIVE
SODIUM SERPL-SCNC: 138 MMOL/L (ref 136–145)
T4 FREE SERPL-MCNC: 1.2 NG/DL (ref 0.7–1.5)
TRIGL SERPL-MCNC: 96 MG/DL (ref ?–150)
TSH SERPL DL<=0.05 MIU/L-ACNC: 2.69 UIU/ML (ref 0.36–3.74)
VIT B12 SERPL-MCNC: 559 PG/ML (ref 211–911)
VLDLC SERPL CALC-MCNC: 19.2 MG/DL

## 2018-03-22 PROCEDURE — 86592 SYPHILIS TEST NON-TREP QUAL: CPT | Performed by: INTERNAL MEDICINE

## 2018-03-22 PROCEDURE — 36415 COLL VENOUS BLD VENIPUNCTURE: CPT | Performed by: INTERNAL MEDICINE

## 2018-03-22 PROCEDURE — 84439 ASSAY OF FREE THYROXINE: CPT | Performed by: INTERNAL MEDICINE

## 2018-03-22 PROCEDURE — 86706 HEP B SURFACE ANTIBODY: CPT | Performed by: INTERNAL MEDICINE

## 2018-03-22 PROCEDURE — 83036 HEMOGLOBIN GLYCOSYLATED A1C: CPT | Performed by: INTERNAL MEDICINE

## 2018-03-22 PROCEDURE — 87340 HEPATITIS B SURFACE AG IA: CPT | Performed by: INTERNAL MEDICINE

## 2018-03-22 PROCEDURE — 80053 COMPREHEN METABOLIC PANEL: CPT | Performed by: INTERNAL MEDICINE

## 2018-03-22 PROCEDURE — 82607 VITAMIN B-12: CPT | Performed by: INTERNAL MEDICINE

## 2018-03-22 PROCEDURE — 86803 HEPATITIS C AB TEST: CPT | Performed by: INTERNAL MEDICINE

## 2018-03-22 PROCEDURE — 80061 LIPID PANEL: CPT | Performed by: INTERNAL MEDICINE

## 2018-03-22 PROCEDURE — 84443 ASSAY THYROID STIM HORMONE: CPT | Performed by: INTERNAL MEDICINE

## 2018-03-22 PROCEDURE — 87389 HIV-1 AG W/HIV-1&-2 AB AG IA: CPT | Performed by: INTERNAL MEDICINE

## 2018-03-22 PROCEDURE — 84153 ASSAY OF PSA TOTAL: CPT | Performed by: INTERNAL MEDICINE

## 2018-03-23 NOTE — TELEPHONE ENCOUNTER
pls call    Results for orders placed or performed during the hospital encounter of 54/23/90   METABOLIC PANEL, COMPREHENSIVE   Result Value Ref Range    Sodium 138 136 - 145 mmol/L    Potassium 4.3 3.5 - 5.5 mmol/L    Chloride 103 100 - 108 mmol/L    CO2 28 21 - 32 mmol/L    Anion gap 7 3.0 - 18 mmol/L    Glucose 103 (H) 74 - 99 mg/dL    BUN 14 7.0 - 18 MG/DL    Creatinine 1.30 0.6 - 1.3 MG/DL    BUN/Creatinine ratio 11 (L) 12 - 20      GFR est AA >60 >60 ml/min/1.73m2    GFR est non-AA 57 (L) >60 ml/min/1.73m2    Calcium 10.1 8.5 - 10.1 MG/DL    Bilirubin, total 0.7 0.2 - 1.0 MG/DL    ALT (SGPT) 27 16 - 61 U/L    AST (SGOT) 15 15 - 37 U/L    Alk. phosphatase 58 45 - 117 U/L    Protein, total 8.0 6.4 - 8.2 g/dL    Albumin 4.2 3.4 - 5.0 g/dL    Globulin 3.8 2.0 - 4.0 g/dL    A-G Ratio 1.1 0.8 - 1.7     HEMOGLOBIN A1C W/O EAG   Result Value Ref Range    Hemoglobin A1c 6.3 (H) 4.2 - 5.6 %   LIPID PANEL   Result Value Ref Range    LIPID PROFILE          Cholesterol, total 251 (H) <200 MG/DL    Triglyceride 96 <150 MG/DL    HDL Cholesterol 88 (H) 40 - 60 MG/DL    LDL, calculated 143.8 (H) 0 - 100 MG/DL    VLDL, calculated 19.2 MG/DL    CHOL/HDL Ratio 2.9 0 - 5.0     PSA, DIAGNOSTIC (PROSTATE SPECIFIC AG)   Result Value Ref Range    Prostate Specific Ag 3.7 0.0 - 4.0 ng/mL   VITAMIN B12 & FOLATE   Result Value Ref Range    Vitamin B12 559 211 - 911 pg/mL    Folate 14.1 3.10 - 17.50 ng/mL   TSH 3RD GENERATION   Result Value Ref Range    TSH 2.69 0.36 - 3.74 uIU/mL   T4, FREE   Result Value Ref Range    T4, Free 1.2 0.7 - 1.5 NG/DL   HIV 1/2 AG/AB, 4TH GENERATION,W RFLX CONFIRM   Result Value Ref Range    HIV 1/2 Interpretation NONREACTIVE NR      HIV 1/2 result comment SEE NOTE     HEPATITIS C AB   Result Value Ref Range    Hepatitis C virus Ab 0.03 <0.80 Index    Hep C  virus Ab Interp.  NEGATIVE  NEG      Hep C  virus Ab comment         HEP B SURFACE AG   Result Value Ref Range    Hepatitis B surface Ag <0.10 <1.00 Index Hep B surface Ag Interp. NEGATIVE  NEG     HEP B SURFACE AB   Result Value Ref Range    Hepatitis B surface Ab 5.46 (L) >10.0 mIU/mL    Hep B surface Ab Interp. NEGATIVE  (A) POS      Hep B surface Ab comment        Samples with a  value of 10 mIU/mL or greater are considered positive (protective immunity) in accordance with the CDC guidelines.    RPR   Result Value Ref Range    RPR NONREACTIVE NR         Labs all ok

## 2018-04-06 ENCOUNTER — OFFICE VISIT (OUTPATIENT)
Dept: INTERNAL MEDICINE CLINIC | Age: 58
End: 2018-04-06

## 2018-04-06 VITALS
RESPIRATION RATE: 14 BRPM | TEMPERATURE: 98.7 F | HEART RATE: 100 BPM | SYSTOLIC BLOOD PRESSURE: 116 MMHG | WEIGHT: 280 LBS | OXYGEN SATURATION: 99 % | BODY MASS INDEX: 34.82 KG/M2 | DIASTOLIC BLOOD PRESSURE: 84 MMHG | HEIGHT: 75 IN

## 2018-04-06 DIAGNOSIS — E66.01 SEVERE OBESITY (BMI 35.0-39.9) WITH COMORBIDITY (HCC): ICD-10-CM

## 2018-04-06 DIAGNOSIS — I10 ESSENTIAL HYPERTENSION: ICD-10-CM

## 2018-04-06 DIAGNOSIS — E11.65 UNCONTROLLED TYPE 2 DIABETES MELLITUS WITH COMPLICATION, UNSPECIFIED WHETHER LONG TERM INSULIN USE: ICD-10-CM

## 2018-04-06 DIAGNOSIS — Z00.00 PHYSICAL EXAM: Primary | ICD-10-CM

## 2018-04-06 DIAGNOSIS — D35.2 PROLACTINOMA (HCC): ICD-10-CM

## 2018-04-06 DIAGNOSIS — E78.5 HYPERLIPIDEMIA, UNSPECIFIED HYPERLIPIDEMIA TYPE: ICD-10-CM

## 2018-04-06 DIAGNOSIS — E11.8 UNCONTROLLED TYPE 2 DIABETES MELLITUS WITH COMPLICATION, UNSPECIFIED WHETHER LONG TERM INSULIN USE: ICD-10-CM

## 2018-04-06 PROBLEM — R20.0 PERIORAL NUMBNESS: Status: RESOLVED | Noted: 2018-02-13 | Resolved: 2018-04-06

## 2018-04-06 RX ORDER — ATORVASTATIN CALCIUM 80 MG/1
80 TABLET, FILM COATED ORAL DAILY
Qty: 90 TAB | Refills: 3 | Status: SHIPPED | OUTPATIENT
Start: 2018-04-06 | End: 2018-11-06 | Stop reason: SDUPTHER

## 2018-04-06 NOTE — PROGRESS NOTES
1. Have you been to the ER, urgent care clinic or hospitalized since your last visit? NO.     2. Have you seen or consulted any other health care providers outside of the [address] [place] [address] since your last visit (Include any pap smears or colon screening)? NO      Do you have an Advanced Directive? NO    Would you like information on Advanced Directives?  NO      Chief Complaint   Patient presents with    Physical     with labs

## 2018-04-06 NOTE — MR AVS SNAPSHOT
[address] [facility] 
 
 
 [address], [address] [facility] 
[phone] Patient: Reyna Demarco MRN: [id number] ROSY:[id number] Visit Information Date & Time Provider Department Dept. Phone Encounter #  
 4/6/2018  3:00 PM Anai Roberts MD [facility] [phone] Your Appointments 5/15/2018  2:40 PM  
Follow Up with Naren Iraheta MD  
[facility] at [address] [facility] [address]) Appt Note: 3 month fu  CT Head  
 [address] [facility] [address] [facility] [address] [address] [address] [address]  
  
    
 8/17/2018  2:30 PM  
Office Visit with Anai Roberts MD  
[facility] [address] [facility]) Appt Note: 4 month follow up labs at [facility] [address], [address] [address] [address] [place]  
  
   
 [address], [address] Upcoming Health Maintenance Date Due  
 FOOT EXAM Q1 2/1/1970 EYE EXAM RETINAL OR DILATED Q1 11/7/2017 HEMOGLOBIN A1C Q6M 9/22/2018 MICROALBUMIN Q1 9/30/2018 LIPID PANEL Q1 3/22/2019 DTaP/Tdap/Td series (5 - Td) 5/22/2022 COLONOSCOPY 6/22/2022 Allergies as of 4/6/2018  Review Complete On: 3/21/2018 By: Anai Roberts MD  
  
 Severity Noted Reaction Type Reactions Hydrochlorothiazide    Other (comments) Weight loss, pt unsure of allergy Current Immunizations  Reviewed on 9/28/2017 Name Date DTaP 8/19/2009, 4/23/2002, 6/14/1998, 6/12/1998, 4/16/1998, 2/13/1998 HPV 9/15/2015, 9/5/2014 Hep A Vaccine 9/3/2013, 8/24/2012 Hep B Vaccine 6/12/1998, 1/12/1998, 12/12/1997 Hib 3/18/1999, 6/12/1998, 2/13/1998 IPV 4/23/2002, 6/14/1999, 4/16/1998, 2/13/1998 Influenza Vaccine 9/15/2015, 10/14/2014, 10/18/2013 Influenza Vaccine (Quad) PF 9/29/2017  3:10 PM  
 Influenza Vaccine Whole 11/19/2011 MMR 4/23/2002, 12/15/1998 Meningococcal ACWY Vaccine 9/5/2014, 8/19/2009 Pneumococcal Polysaccharide (PPSV-23) 9/29/2017  3:10 PM  
 TDAP Vaccine 5/22/2012 Zoster 5/22/2012 Zoster Vaccine, Live 8/15/2008, 12/15/1998 Not reviewed this visit Vitals BP Pulse Temp Resp Height(growth percentile) Weight(growth percentile) 112/90 (BP 1 Location: Right arm, BP Patient Position: Sitting) 100 98.7 °F (37.1 °C) (Oral) 14 6' 3\" (1.905 m) 280 lb (127 kg) SpO2 BMI Smoking Status 99% 35 kg/m2 Never Smoker Vitals History BMI and BSA Data Body Mass Index Body Surface Area 35 kg/m 2 2.59 m 2 Preferred Pharmacy Pharmacy Name Phone [facility] [phone] [facility], [address] [facility] [phone] Your Updated Medication List  
  
   
This list is accurate as of 4/6/18  3:56 PM.  Always use your most recent med list. amLODIPine-benazepril 5-20 mg per capsule Commonly known as:  Emile Gnosticist Take 1 Cap by mouth daily. atorvastatin 80 mg tablet Commonly known as:  LIPITOR Take 1 Tab by mouth daily. cabergoline 0.5 mg tablet Commonly known as:  DOSTINEX Take 1 Tab by mouth every Monday.  
  
 ezetimibe 10 mg tablet Commonly known as:  Hyla Noti Take 1 Tab by mouth daily. * glucose blood VI test strips strip Commonly known as:  ONETOUCH ULTRA TEST Patient to test Blood sugar 1 a day DX: E11.65  
  
 * glucose blood VI test strips strip Commonly known as:  Swan Cristin Patient checks blood sugar daily, Dx E11.9  
  
 meloxicam 15 mg tablet Commonly known as:  MOBIC Take 1 Tab by mouth daily. metFORMIN  mg tablet Commonly known as:  GLUCOPHAGE XR Take 2 Tabs by mouth daily (with dinner). multivitamin tablet Commonly known as:  ONE A DAY Take 1 Tab by mouth daily. omeprazole 20 mg capsule Commonly known as:  PRILOSEC Take 1 Cap by mouth daily. OTHER Vitamin A supplement  
  
 tadalafil 20 mg tablet Commonly known as:  CIALIS Take 1 Tab by mouth as needed. valACYclovir 1 gram tablet Commonly known as:  VALTREX Take 1 Tab by mouth daily. VITAMIN C 250 mg tablet Generic drug:  ascorbic acid (vitamin C) Take  by mouth. Pt unsure of dosage * Notice: This list has 2 medication(s) that are the same as other medications prescribed for you. Read the directions carefully, and ask your doctor or other care provider to review them with you. Introducing [facility]! [facility] introduces [employer] patient portal. Now you can access parts of your medical record, email your doctor's office, and request medication refills online. 1. In your internet browser, go to [email or web address] 2. Click on the First Time User? Click Here link in the Sign In box. You will see the New Member Sign Up page. 3. Enter your [employer] Access Code exactly as it appears below. You will not need to use this code after youve completed the sign-up process. If you do not sign up before the expiration date, you must request a new code. · [employer] Access Code: Shay Villa Expires: 7/5/2018  2:38 PM 
 
4. Enter the last four digits of your Social Security Number (xxxx) and Date of Birth (mm/dd/yyyy) as indicated and click Submit. You will be taken to the next sign-up page. 5. Create a [employer] ID. This will be your [employer] login ID and cannot be changed, so think of one that is secure and easy to remember. 6. Create a [employer] password. You can change your password at any time. 7. Enter your Password Reset Question and Answer. This can be used at a later time if you forget your password. 8. Enter your e-mail address. You will receive e-mail notification when new information is available in [address] 9. Click Sign Up. You can now view and download portions of your medical record. 10. Click the Download Summary menu link to download a portable copy of your medical information. If you have questions, please visit the Frequently Asked Questions section of the [employer] website. Remember, [employer] is NOT to be used for urgent needs. For medical emergencies, dial 911. Now available from your iPhone and Android! Please provide this summary of care documentation to your next provider. Your primary care clinician is listed as Pio Kirkpatrick. If you have any questions after today's visit, please call [phone].

## 2018-04-06 NOTE — PROGRESS NOTES
62 y.o. BLACK OR  male who presents for f/u    He stopped the metformin and the perioral paresthesias have gone. We had referred him to ENT but he was never called apparently and is currently not interested in getting opinion. He wants to stay off the metformin and is wanting a different med    On his own, he changed the diet and lifestyle and has managed to lose about 20+ lbs on his scale. He's eating a good breakfast at 6-7A and a reasonable lunch, oftentimes he just eats a sandwich for dinner and drinks plenty of water all day long. Denies polyuria, polydipsia, nocturia, vision change. Not checking sugars at this time. No hypoglycemia episodes noted    Vitals 4/6/2018 3/21/2018 2/13/2018 12/29/2017 9/29/2017 9/29/2017   Weight 280 lb 280 lb 280 lb 284 lb 8 oz  294 lb     Vitals 3/31/2017   Weight 292 lb     Denies any cardiovascular complaints. He's been trying to be more regular with the exercise    He's noted some slowdown in the bms but thinks it's related to the change in diet. No urinary complaints. Not using the cialis lately,.   He wants to stop the valtrex also as no breakouts in over 10 years    IF 4/18    Past Medical History:   Diagnosis Date    Allergic rhinitis     Deviated septum and epistaxis Dr. Ab Garnett 2009     Diabetes mellitus ([facility] [address]     on basis of elev a1c 12/15    ED (erectile dysfunction)     Fatty liver     on US 6/10; Fib-4 was 0.91 from 5/12    H. pylori infection     EGD w dilation Dr Lubna Riggs 3/15    HSV (herpes simplex virus) infection     Hyperlipidemia     Hypertension     Obesity     peak 292 lbs, bmi 37.8 from 2/14; IF 4/18    Prediabetes 3/10    2 hr GTT nl     Prolactinoma (HCC)     Dr. Gray Man      Past Surgical History:   Procedure Laterality Date   Helene Sanchez 6/12 negative    HX GI  2/01    negative barium enema    HX ORTHOPAEDIC  11/2015    medial meniscus tear left knee Dr Beata Nolasco 03/2018    CT head neg     Social History     Social History    Marital status:      Spouse name: N/A    Number of children: 2    Years of education: N/A     Occupational History     [place]      Social History Main Topics    Smoking status: Never Smoker    Smokeless tobacco: Never Used    Alcohol use No    Drug use: No    Sexual activity: Not on file     Other Topics Concern    Not on file     Social History Narrative     Allergies   Allergen Reactions    Hydrochlorothiazide Other (comments)     Weight loss, pt unsure of allergy      Metformin Other (comments)     paresthesia     Current Outpatient Prescriptions   Medication Sig    multivitamin (ONE A DAY) tablet Take 1 Tab by mouth daily.  ascorbic acid, vitamin C, (VITAMIN C) 250 mg tablet Take  by mouth. Pt unsure of dosage    OTHER Vitamin A supplement    glucose blood VI test strips (ONETOUCH VERIO) strip Patient checks blood sugar daily, Dx E11.9    glucose blood VI test strips (ONETOUCH ULTRA TEST) strip Patient to test Blood sugar 1 a day DX: E11.65    ezetimibe (ZETIA) 10 mg tablet Take 1 Tab by mouth daily.  cabergoline (DOSTINEX) 0.5 mg tablet Take 1 Tab by mouth every Monday.  amLODIPine-benazepril (LOTREL) 5-20 mg per capsule Take 1 Cap by mouth daily.  valACYclovir (VALTREX) 1 gram tablet Take 1 Tab by mouth daily.  tadalafil (CIALIS) 20 mg tablet Take 1 Tab by mouth as needed.  omeprazole (PRILOSEC) 20 mg capsule Take 1 Cap by mouth daily.  meloxicam (MOBIC) 15 mg tablet Take 1 Tab by mouth daily.  atorvastatin (LIPITOR) 80 mg tablet Take 1 Tab by mouth daily. No current facility-administered medications for this visit.       REVIEW OF SYSTEMS: colo 6/12 Dr Kim Bustamante  Ophtho - no vision change or eye pain  Oral - no mouth pain, tongue or tooth problems  Ears - no hearing loss, ear pain, fullness, no swallowing problems  Cardiac - no CP, PND, orthopnea, edema, palpitations or syncope  Chest - no breast masses  Resp - no wheezing, chronic coughing, dyspnea  GI - no heartburn, nausea, vomiting, change in bowel habits, bleeding, hemorrhoids  Urinary - no dysuria, hematuria, flank pain, urgency, frequency  Constitutional - no wt loss, night sweats, unexplained fevers  Neuro - no focal weakness, numbness, paresthesias, incoordination, ataxia, involuntary movements  Endo - no polyuria, polydipsia, nocturia, hot flashes    Visit Vitals    /84 (BP 1 Location: Right arm, BP Patient Position: Sitting)    Pulse 100    Temp 98.7 °F (37.1 °C) (Oral)    Resp 14    Ht 6' 3\" (1.905 m)    Wt 280 lb (127 kg)    SpO2 99%    BMI 35 kg/m2     A&O x3  Affect is appropriate. Mood stable  No apparent distress  Anicteric, no JVD, adenopathy or thyromegaly. No carotid bruits or radiated murmur  Lungs clear to auscultation, no wheezes or rales  Heart showed regular rate and rhythm. No murmur, rubs, gallops  Abdomen soft nontender, no hepatosplenomegaly or masses. Extremities without edema.   Pulses 1-2+ symmetrically    LABS  From 2/10 showed    gluc 111, cr 1.10, gfr>60, alt 51, hba1c 6.0,                   chol 278, tg 122,  hdl 56, ldl-c 198, wbc 3.2, hb 13.8, plt 230, psa 0.60  From 3/10 showed                                 2hr GTT 88  From 6/10 showed    gluc 105, cr 1.30, gfr>60, alt 59,                              wbc 3.6, hb 13.9, plt 237  From 10/11 showed  gluc 102, cr 1.18, gfr 82,  alt 22, hba1c 6.5,                             wbc 3.5, hb 13.5, plt 255,               psa 1.20,  prl 30.8  From 11/11 showed                ldl-p 1245,                        2 hr GTT 89  From 2/12 showed                                ua neg  From 5/12 showed    gluc 109, cr 1.10, gfr>60, alt 17, hba1c 6.1,                   chol 224, tg 70,   hdl 70, ldl-c 140, wbc 3.8, hb 14.3, plt 250, psa 1.08  From 10/12 showed  gluc 98,   cr 1.00, gfr>60, alt 23, hba1c 6.2, ldl-p 1647, chol 216, tg 64,   hdl 63, ldl-c 148  From 1/14 showed    gluc 99,    cr 1.30, gfr 58,  alt 31, hba1c 6.4,      chol 196, tg 81,   hdl 67, ldl-c 113  From 8/14 showed    gluc 100,  cr 1.20, gfr>60, alt 19, hba1c 6.2,      chol 288, tg 140, hdl 69, ldl-c 191         umar neg  From 9/14 showed                       tsh 1.84  From 2/15 showed    gluc 118, cr 1.34, gfr 68,  alt 19, hba1c 6.4,      chol 301, tg 190, hdl 63, ldl-c 200,         umar neg   From 8/15 showed         hba1c 6.3,      chol 267, tg 90,   hdl 70, ldl-c 179  From 12/15 showed  gluc 102, cr 1.29, gfr>60,     hba1c 6.8,               umar 2  From 3/16 showed    gluc 96,   cr 1.23, gfr>60,                   wbc 4.1, hb 13.3, plt 240  From 10/16 showed  gluc 110, cr 1.13, gfr>60, alt 29, hba1c 6.4,      chol 233, tg 120, hdl 73, ldl-c 136  From 9/17 showed    gluc 99,   cr 1.25, gfr>60, alt 29, hba1c 6.5,      chol 240, tg 93,   hdl 79, ldl-c 142, wbc 3.9, hb 14.4, plt 217, umar neg,    psa 3.80    Results for orders placed or performed during the hospital encounter of 73/53/85   METABOLIC PANEL, COMPREHENSIVE   Result Value Ref Range    Sodium 138 136 - 145 mmol/L    Potassium 4.3 3.5 - 5.5 mmol/L    Chloride 103 100 - 108 mmol/L    CO2 28 21 - 32 mmol/L    Anion gap 7 3.0 - 18 mmol/L    Glucose 103 (H) 74 - 99 mg/dL    BUN 14 7.0 - 18 MG/DL    Creatinine 1.30 0.6 - 1.3 MG/DL    BUN/Creatinine ratio 11 (L) 12 - 20      GFR est AA >60 >60 ml/min/1.73m2    GFR est non-AA 57 (L) >60 ml/min/1.73m2    Calcium 10.1 8.5 - 10.1 MG/DL    Bilirubin, total 0.7 0.2 - 1.0 MG/DL    ALT (SGPT) 27 16 - 61 U/L    AST (SGOT) 15 15 - 37 U/L    Alk.  phosphatase 58 45 - 117 U/L    Protein, total 8.0 6.4 - 8.2 g/dL    Albumin 4.2 3.4 - 5.0 g/dL    Globulin 3.8 2.0 - 4.0 g/dL    A-G Ratio 1.1 0.8 - 1.7     HEMOGLOBIN A1C W/O EAG   Result Value Ref Range    Hemoglobin A1c 6.3 (H) 4.2 - 5.6 %   LIPID PANEL   Result Value Ref Range    LIPID PROFILE          Cholesterol, total 251 (H) <200 MG/DL    Triglyceride 96 <150 MG/DL    HDL Cholesterol 88 (H) 40 - 60 MG/DL    LDL, calculated 143.8 (H) 0 - 100 MG/DL    VLDL, calculated 19.2 MG/DL    CHOL/HDL Ratio 2.9 0 - 5.0     PSA, DIAGNOSTIC (PROSTATE SPECIFIC AG)   Result Value Ref Range    Prostate Specific Ag 3.7 0.0 - 4.0 ng/mL   VITAMIN B12 & FOLATE   Result Value Ref Range    Vitamin B12 559 211 - 911 pg/mL    Folate 14.1 3.10 - 17.50 ng/mL   TSH 3RD GENERATION   Result Value Ref Range    TSH 2.69 0.36 - 3.74 uIU/mL   T4, FREE   Result Value Ref Range    T4, Free 1.2 0.7 - 1.5 NG/DL   HIV 1/2 AG/AB, 4TH GENERATION,W RFLX CONFIRM   Result Value Ref Range    HIV 1/2 Interpretation NONREACTIVE NR      HIV 1/2 result comment SEE NOTE     HEPATITIS C AB   Result Value Ref Range    Hepatitis C virus Ab 0.03 <0.80 Index    Hep C  virus Ab Interp. NEGATIVE  NEG      Hep C  virus Ab comment         HEP B SURFACE AG   Result Value Ref Range    Hepatitis B surface Ag <0.10 <1.00 Index    Hep B surface Ag Interp. NEGATIVE  NEG     HEP B SURFACE AB   Result Value Ref Range    Hepatitis B surface Ab 5.46 (L) >10.0 mIU/mL    Hep B surface Ab Interp. NEGATIVE  (A) POS      Hep B surface Ab comment        Samples with a  value of 10 mIU/mL or greater are considered positive (protective immunity) in accordance with the CDC guidelines. RPR   Result Value Ref Range    RPR NONREACTIVE NR       Patient Active Problem List   Diagnosis Code    Prolactin microadenoma Dr. Maddison Eastman D35.2    Hyperlipidemia E78.5    Essential hypertension I10    Diabetes mellitus type 2, uncontrolled ([address] E11.65    Obesity (BMI 30-39. 9) E66.9    Erectile dysfunction N52.9    Perioral numbness R20.0    Severe obesity (BMI 35.0-39. 9) with comorbidity ([address] E66.01     Assessment and plan:  1. Ortho. F/U Dr Star Jarvis  2. Obesity. Lifestyle and dietary measures, previously declined appetite supp. Congratulated him on his success thus far. Intermittent fasting discussed at length. Explained the concepts in detail. Went over possible physiologic changes that could occur and how that would possibly impact his/her situation in a positive way. Discussed 16:8 program in particular. We also went over the differences between hunger and anita hypoglycemia and he will need to be watchful with the diabete. He thinks he'll be able to continue with 6a-2p schedule. 3.  DM. Check labs, f/u ophth. Trial of januvia, watch for hypo episodes, other sfx discussed  4. Erectile dysfunction. Prn meds  5. Prolactinoma. Continue dostinex  6. HLP. He's not been taking the lipitor for reasons unclear so will refill  7. HTN. Continue current regimen. 8.  Perioral numbness. Resolving, possible drug effect??      RTC 8/18    Above conditions discussed at length and patient vocalized understanding.   All questions answered to patient satifaction

## 2018-04-09 ENCOUNTER — TELEPHONE (OUTPATIENT)
Dept: INTERNAL MEDICINE CLINIC | Age: 58
End: 2018-04-09

## 2018-07-31 DIAGNOSIS — D35.2 PROLACTINOMA (HCC): ICD-10-CM

## 2018-08-01 RX ORDER — CABERGOLINE 0.5 MG/1
TABLET ORAL
Qty: 12 TAB | Refills: 3 | Status: SHIPPED | OUTPATIENT
Start: 2018-08-01 | End: 2018-11-06 | Stop reason: SDUPTHER

## 2018-08-17 ENCOUNTER — OFFICE VISIT (OUTPATIENT)
Dept: INTERNAL MEDICINE CLINIC | Age: 58
End: 2018-08-17

## 2018-08-17 VITALS
BODY MASS INDEX: 34.57 KG/M2 | RESPIRATION RATE: 14 BRPM | HEART RATE: 74 BPM | TEMPERATURE: 98.3 F | SYSTOLIC BLOOD PRESSURE: 128 MMHG | WEIGHT: 278 LBS | HEIGHT: 75 IN | DIASTOLIC BLOOD PRESSURE: 82 MMHG | OXYGEN SATURATION: 98 %

## 2018-08-17 DIAGNOSIS — N52.9 ERECTILE DYSFUNCTION, UNSPECIFIED ERECTILE DYSFUNCTION TYPE: ICD-10-CM

## 2018-08-17 RX ORDER — TADALAFIL 20 MG/1
20 TABLET ORAL AS NEEDED
Qty: 30 TAB | Refills: 11 | Status: SHIPPED | OUTPATIENT
Start: 2018-08-17 | End: 2019-11-11

## 2018-08-17 RX ORDER — METFORMIN HYDROCHLORIDE 500 MG/1
TABLET ORAL 2 TIMES DAILY WITH MEALS
COMMUNITY
End: 2019-01-07 | Stop reason: SDUPTHER

## 2018-08-17 RX ORDER — METFORMIN HYDROCHLORIDE 500 MG/1
TABLET ORAL 2 TIMES DAILY WITH MEALS
COMMUNITY
End: 2018-08-17 | Stop reason: SDUPTHER

## 2018-08-17 RX ORDER — METFORMIN HYDROCHLORIDE 500 MG/1
1000 TABLET, EXTENDED RELEASE ORAL
Qty: 90 TAB | Refills: 3 | Status: SHIPPED | OUTPATIENT
Start: 2018-08-17 | End: 2019-01-31

## 2018-08-17 NOTE — PROGRESS NOTES
1. Have you been to the ER, urgent care clinic or hospitalized since your last visit? NO.     2. Have you seen or consulted any other health care providers outside of the [address] [place] [address] since your last visit (Include any pap smears or colon screening)? NO      Do you have an Advanced Directive? NO    Would you like information on Advanced Directives?  NO    Chief Complaint   Patient presents with    Hypertension     4 month follow up

## 2018-08-17 NOTE — PROGRESS NOTES
62 y.o. BLACK OR  male who presents for f/u    He stopped the metformin and the perioral paresthesias have gone. Tried to change him to [facility] [place] but too expensive with his insurance so he just started back the metformin. He can live with the minimal paresthesias now    He has maintained the current dietary approach, trying to stick to a 6a to 2p window most days. Denies polyuria, polydipsia, nocturia, vision change. Weight is down minimally as below    Vitals 8/17/2018 4/6/2018 3/21/2018 2/13/2018 12/29/2017   Weight 278 lb 280 lb 280 lb 280 lb 284 lb 8 oz     Denies any cardiovascular complaints.   He's been trying to be more regular with the exercise although really just more walkingn than anything else    IF 4/18    Past Medical History:   Diagnosis Date    Allergic rhinitis     Deviated septum and epistaxis Dr. Manuelito Vergara 2009     Diabetes mellitus ([facility] [address]     on basis of elev a1c 12/15    ED (erectile dysfunction)     Fatty liver     on US 6/10; Fib-4 was 0.91 from 5/12    H. pylori infection     EGD w dilation Dr Gail Zendejas 3/15    HSV (herpes simplex virus) infection     Hyperlipidemia     Hypertension     Obesity     peak 292 lbs, bmi 37.8 from 2/14; IF 4/18    Prediabetes 3/10    2 hr GTT nl     Prolactinoma (HCC)     Dr. Mel Crenshaw      Past Surgical History:   Procedure Laterality Date   Vanessa Mayen 6/12 negative    HX GI  2/01    negative barium enema    HX ORTHOPAEDIC  11/2015    medial meniscus tear left knee Dr Lio Martinez  03/2018    CT head neg     Social History     Social History    Marital status:      Spouse name: N/A    Number of children: 2    Years of education: N/A     Occupational History     NG      Social History Main Topics    Smoking status: Never Smoker    Smokeless tobacco: Never Used    Alcohol use No    Drug use: No    Sexual activity: Not on file     Other Topics Concern    Not on file     Social History Narrative     Allergies   Allergen Reactions    Hydrochlorothiazide Other (comments)     Weight loss, pt unsure of allergy       Current Outpatient Prescriptions   Medication Sig    metFORMIN (GLUCOPHAGE) 500 mg tablet Take  by mouth two (2) times daily (with meals).  metFORMIN ER (GLUCOPHAGE XR) 500 mg tablet Take 2 Tabs by mouth daily (with dinner).  tadalafil (CIALIS) 20 mg tablet Take 1 Tab by mouth as needed.  cabergoline (DOSTINEX) 0.5 mg tablet take 1 tablet by mouth every MONDAY    atorvastatin (LIPITOR) 80 mg tablet Take 1 Tab by mouth daily.  multivitamin (ONE A DAY) tablet Take 1 Tab by mouth daily.  ascorbic acid, vitamin C, (VITAMIN C) 250 mg tablet Take  by mouth. Pt unsure of dosage    OTHER Vitamin A supplement    glucose blood VI test strips (ONETOUCH VERIO) strip Patient checks blood sugar daily, Dx E11.9    glucose blood VI test strips (ONETOUCH ULTRA TEST) strip Patient to test Blood sugar 1 a day DX: E11.65    valACYclovir (VALTREX) 1 gram tablet Take 1 Tab by mouth daily.  ezetimibe (ZETIA) 10 mg tablet Take 1 Tab by mouth daily.  amLODIPine-benazepril (LOTREL) 5-20 mg per capsule Take 1 Cap by mouth daily.  SITagliptin (JANUVIA) 100 mg tablet Take 1 Tab by mouth daily. (Patient taking differently: Take 100 mg by mouth daily. Indications: not taking)    omeprazole (PRILOSEC) 20 mg capsule Take 1 Cap by mouth daily. (Patient taking differently: Take 20 mg by mouth daily. Indications: not taking)    meloxicam (MOBIC) 15 mg tablet Take 1 Tab by mouth daily. (Patient taking differently: Take 15 mg by mouth daily. Indications: not taking)     No current facility-administered medications for this visit.       REVIEW OF SYSTEMS: colo 6/12 Dr Brain Kang  Ophtho - no vision change or eye pain  Oral - no mouth pain, tongue or tooth problems  Ears - no hearing loss, ear pain, fullness, no swallowing problems  Cardiac - no CP, PND, orthopnea, edema, palpitations or syncope  Chest - no breast masses  Resp - no wheezing, chronic coughing, dyspnea  GI - no heartburn, nausea, vomiting, change in bowel habits, bleeding, hemorrhoids  Urinary - no dysuria, hematuria, flank pain, urgency, frequency  Constitutional - no wt loss, night sweats, unexplained fevers  Neuro - no focal weakness, numbness, paresthesias, incoordination, ataxia, involuntary movements  Endo - no polyuria, polydipsia, nocturia, hot flashes    Visit Vitals    /82    Pulse 74    Temp 98.3 °F (36.8 °C) (Oral)    Resp 14    Ht 6' 3\" (1.905 m)    Wt 278 lb (126.1 kg)    SpO2 98%    BMI 34.75 kg/m2     A&O x3  Affect is appropriate. Mood stable  No apparent distress  Anicteric, no JVD, adenopathy or thyromegaly. No carotid bruits or radiated murmur  Lungs clear to auscultation, no wheezes or rales  Heart showed regular rate and rhythm. No murmur, rubs, gallops  Abdomen soft nontender, no hepatosplenomegaly or masses. Extremities without edema.   Pulses 1-2+ symmetrically    LABS  From 2/10 showed    gluc 111, cr 1.10, gfr>60, alt 51, hba1c 6.0,                   chol 278, tg 122,  hdl 56, ldl-c 198, wbc 3.2, hb 13.8, plt 230,    psa 0.60  From 3/10 showed                                 2hr GTT 88  From 6/10 showed    gluc 105, cr 1.30, gfr>60, alt 59,                              wbc 3.6, hb 13.9, plt 237  From 10/11 showed  gluc 102, cr 1.18, gfr 82,  alt 22, hba1c 6.5,                             wbc 3.5, hb 13.5, plt 255,               psa 1.20,  prl 30.8  From 11/11 showed                ldl-p 1245,                        2 hr GTT 89  From 2/12 showed                                ua neg  From 5/12 showed    gluc 109, cr 1.10, gfr>60, alt 17, hba1c 6.1,                   chol 224, tg 70,   hdl 70, ldl-c 140, wbc 3.8, hb 14.3, plt 250,    psa 1.08  From 10/12 showed  gluc 98,   cr 1.00, gfr>60, alt 23, hba1c 6.2, ldl-p 1647, chol 216, tg 64,   hdl 63, ldl-c 148  From 1/14 showed gluc 99,    cr 1.30, gfr 58,  alt 31, hba1c 6.4,      chol 196, tg 81,   hdl 67, ldl-c 113  From 8/14 showed    gluc 100,  cr 1.20, gfr>60, alt 19, hba1c 6.2,      chol 288, tg 140, hdl 69, ldl-c 191         umar neg  From 9/14 showed                       tsh 1.84  From 2/15 showed    gluc 118, cr 1.34, gfr 68,  alt 19, hba1c 6.4,      chol 301, tg 190, hdl 63, ldl-c 200,         umar neg   From 8/15 showed         hba1c 6.3,      chol 267, tg 90,   hdl 70, ldl-c 179  From 12/15 showed  gluc 102, cr 1.29, gfr>60,     hba1c 6.8,               umar 2  From 3/16 showed    gluc 96,   cr 1.23, gfr>60,                   wbc 4.1, hb 13.3, plt 240  From 10/16 showed  gluc 110, cr 1.13, gfr>60, alt 29, hba1c 6.4,      chol 233, tg 120, hdl 73, ldl-c 136  From 9/17 showed    gluc 99,   cr 1.25, gfr>60, alt 29, hba1c 6.5,      chol 240, tg 93,   hdl 79, ldl-c 142, wbc 3.9, hb 14.4, plt 217, umar neg,    psa 3.80  From 4/18 showed    gluc 103, cr 1.30, gfr>60, alt 27, hba1c 6.3,      chol 251, tg 96,   hdl 88, ldl-c 144, tsh 2.69,      psa 3.70, b12 559, fol 14.1, free t4 1.20, hiv neg, rpr neg, hep b/c neg    Patient Active Problem List   Diagnosis Code    Prolactin microadenoma Dr. Paula Ramos D35.2    Hyperlipidemia E78.5    Essential hypertension I10    Diabetes mellitus type 2, uncontrolled ([address] E11.65    Erectile dysfunction N52.9    Severe obesity (BMI 35.0-39. 9) with comorbidity ([facility] [address] E66.01     Assessment and plan:  1. Ortho. F/U Dr Kacy Vinson  2. Obesity. Lifestyle and dietary measures, previously declined appetite supp. Trying to stick with IF  3. DM. Continue current and check labs next visit  4. Erectile dysfunction. Refilled cialis  5. Prolactinoma. Continue dostinex  6. HLP. Continue current regimen. 7.  HTN. Continue current regimen. RTC 2/19    Above conditions discussed at length and patient vocalized understanding.   All questions answered to patient satisfaction

## 2018-08-17 NOTE — MR AVS SNAPSHOT
[facility] 
 
 
 [address], [address] [facility] 
[phone] Patient: Sharon Mccauley MRN: [id number] Visit Information Date & Time Provider Department Dept. Phone Encounter #  
 8/17/2018  2:30 PM Efra Hernandez MD [facility] [id number] Your Appointments 2/18/2019  2:45 PM  
Office Visit with Efra Hernandez MD  
[facility] [address]) Appt Note: ov [address]  
 [address], [address] [place] [place] [address] [place]  
  
   
 [address], [address] Upcoming Health Maintenance Date Due  
 FOOT EXAM Q1 2/1/1970 Influenza Age 5 to Adult 8/1/2018 HEMOGLOBIN A1C Q6M 9/22/2018 MICROALBUMIN Q1 9/30/2018 LIPID PANEL Q1 3/22/2019 EYE EXAM RETINAL OR DILATED Q1 6/8/2019 DTaP/Tdap/Td series (5 - Td) 5/22/2022 COLONOSCOPY 6/22/2022 Allergies as of 8/17/2018  Review Complete On: 8/17/2018 By: Efra Hernandez MD  
  
 Severity Noted Reaction Type Reactions Hydrochlorothiazide    Other (comments) Weight loss, pt unsure of allergy Current Immunizations  Reviewed on 9/28/2017 Name Date DTaP 8/19/2009, 4/23/2002, 6/14/1998, 6/12/1998, 4/16/1998, 2/13/1998 HPV 9/15/2015, 9/5/2014 Hep A Vaccine 9/3/2013, 8/24/2012 Hep B Vaccine 6/12/1998, 1/12/1998, 12/12/1997 Hib 3/18/1999, 6/12/1998, 2/13/1998 IPV 4/23/2002, 6/14/1999, 4/16/1998, 2/13/1998 Influenza Vaccine 9/15/2015, 10/14/2014, 10/18/2013 Influenza Vaccine (Quad) PF 9/29/2017  3:10 PM  
 Influenza Vaccine Whole 11/19/2011 MMR 4/23/2002, 12/15/1998 Meningococcal ACWY Vaccine 9/5/2014, 8/19/2009 Pneumococcal Polysaccharide (PPSV-23) 9/29/2017  3:10 PM  
 TDAP Vaccine 5/22/2012 Zoster 5/22/2012 Zoster Vaccine, Live 8/15/2008, 12/15/1998 Not reviewed this visit You Were Diagnosed With   
  
 Codes Comments Erectile dysfunction, unspecified erectile dysfunction type     ICD-10-CM: N52.9 ICD-9-CM: 607.84 Vitals BP Pulse Temp Resp Height(growth percentile) Weight(growth percentile) (!) 142/96 74 98.3 °F (36.8 °C) (Oral) 14 6' 3\" (1.905 m) 278 lb (126.1 kg) SpO2 BMI Smoking Status 98% 34.75 kg/m2 Never Smoker Vitals History BMI and BSA Data Body Mass Index Body Surface Area 34.75 kg/m 2 2.58 m 2 Preferred Pharmacy Pharmacy Name Phone [facility] [address] [place] [place] [phone] Your Updated Medication List  
  
   
This list is accurate as of 8/17/18  2:57 PM.  Always use your most recent med list. amLODIPine-benazepril 5-20 mg per capsule Commonly known as:  Arthurine Yoni Take 1 Cap by mouth daily. atorvastatin 80 mg tablet Commonly known as:  LIPITOR Take 1 Tab by mouth daily. cabergoline 0.5 mg tablet Commonly known as:  DOSTINEX  
take 1 tablet by mouth every MONDAY  
  
 ezetimibe 10 mg tablet Commonly known as:  Levorn Block Take 1 Tab by mouth daily. * glucose blood VI test strips strip Commonly known as:  ONETOUCH ULTRA TEST Patient to test Blood sugar 1 a day DX: E11.65  
  
 * glucose blood VI test strips strip Commonly known as:  Sebastian Ava Patient checks blood sugar daily, Dx E11.9  
  
 meloxicam 15 mg tablet Commonly known as:  MOBIC Take 1 Tab by mouth daily. * metFORMIN 500 mg tablet Commonly known as:  GLUCOPHAGE Take  by mouth two (2) times daily (with meals). * metFORMIN  mg tablet Commonly known as:  GLUCOPHAGE XR Take 2 Tabs by mouth daily (with dinner). multivitamin tablet Commonly known as:  ONE A DAY Take 1 Tab by mouth daily. omeprazole 20 mg capsule Commonly known as:  PRILOSEC Take 1 Cap by mouth daily. OTHER Vitamin A supplement SITagliptin 100 mg tablet Commonly known as:  Stacy Pipe Take 1 Tab by mouth daily. tadalafil 20 mg tablet Commonly known as:  CIALIS Take 1 Tab by mouth as needed. valACYclovir 1 gram tablet Commonly known as:  VALTREX Take 1 Tab by mouth daily. VITAMIN C 250 mg tablet Generic drug:  ascorbic acid (vitamin C) Take  by mouth. Pt unsure of dosage * Notice: This list has 4 medication(s) that are the same as other medications prescribed for you. Read the directions carefully, and ask your doctor or other care provider to review them with you. Prescriptions Printed Refills  
 tadalafil (CIALIS) 20 mg tablet 11 Sig: Take 1 Tab by mouth as needed. Class: Print Route: Oral  
  
Prescriptions Sent to Pharmacy Refills  
 metFORMIN ER (GLUCOPHAGE XR) 500 mg tablet 3 Sig: Take 2 Tabs by mouth daily (with dinner). Class: Normal  
 Pharmacy: [id number] [address] Ph #: [phone] Route: Oral  
  
Introducing [facility]! [employer] introduces Pay by Shopping (deal united) patient portal. Now you can access parts of your medical record, email your doctor's office, and request medication refills online. 1. In your internet browser, go to [email or web address] 2. Click on the First Time User? Click Here link in the Sign In box. You will see the New Member Sign Up page. 3. Enter your Pay by Shopping (deal united) Access Code exactly as it appears below. You will not need to use this code after youve completed the sign-up process. If you do not sign up before the expiration date, you must request a new code. · Pay by Shopping (deal united) Access Code: [email or web address] Expires: 11/15/2018  2:57 PM 
 
4. Enter the last four digits of your Social Security Number (xxxx) and Date of Birth (mm/dd/yyyy) as indicated and click Submit. You will be taken to the next sign-up page. 5. Create a Pay by Shopping (deal united) ID.  This will be your Pay by Shopping (deal united) login ID and cannot be changed, so think of one that is secure and easy to remember. 6. Create a [employer] password. You can change your password at any time. 7. Enter your Password Reset Question and Answer. This can be used at a later time if you forget your password. 8. Enter your e-mail address. You will receive e-mail notification when new information is available in [address] 9. Click Sign Up. You can now view and download portions of your medical record. 10. Click the Download Summary menu link to download a portable copy of your medical information. If you have questions, please visit the Frequently Asked Questions section of the [employer] website. Remember, [employer] is NOT to be used for urgent needs. For medical emergencies, dial 911. Now available from your iPhone and Android! Please provide this summary of care documentation to your next provider. Your primary care clinician is listed as Natalia Girard. If you have any questions after today's visit, please call [phone].

## 2018-09-19 DIAGNOSIS — B00.9 HSV INFECTION: ICD-10-CM

## 2018-09-19 RX ORDER — VALACYCLOVIR HYDROCHLORIDE 1 G/1
1000 TABLET, FILM COATED ORAL DAILY
Qty: 90 TAB | Refills: 3 | Status: SHIPPED | OUTPATIENT
Start: 2018-09-19 | End: 2019-11-02 | Stop reason: SDUPTHER

## 2018-09-19 NOTE — TELEPHONE ENCOUNTER
Last Visit: 08/17/2018 with MD Josue Charles    Next Appointment: 02/18/2019 with MD Josue Charles   Previous Refill Encounters: 09/29/2017 per MD Josue Charles #[phone] with 3 refills    Requested Prescriptions     Pending Prescriptions Disp Refills    valACYclovir (VALTREX) 1 gram tablet 90 Tab 3     Sig: Take 1 Tab by mouth daily.

## 2018-10-09 DIAGNOSIS — E78.5 HYPERLIPIDEMIA, UNSPECIFIED HYPERLIPIDEMIA TYPE: ICD-10-CM

## 2018-10-10 RX ORDER — EZETIMIBE 10 MG/1
TABLET ORAL
Qty: 30 TAB | Refills: 11 | Status: SHIPPED | OUTPATIENT
Start: 2018-10-10 | End: 2019-07-17

## 2018-10-10 RX ORDER — AMLODIPINE AND BENAZEPRIL HYDROCHLORIDE 5; 20 MG/1; MG/1
CAPSULE ORAL
Qty: 90 CAP | Refills: 3 | Status: SHIPPED | OUTPATIENT
Start: 2018-10-10 | End: 2020-01-13

## 2018-11-06 ENCOUNTER — OFFICE VISIT (OUTPATIENT)
Dept: INTERNAL MEDICINE CLINIC | Age: 58
End: 2018-11-06

## 2018-11-06 VITALS
DIASTOLIC BLOOD PRESSURE: 72 MMHG | HEART RATE: 89 BPM | SYSTOLIC BLOOD PRESSURE: 128 MMHG | OXYGEN SATURATION: 97 % | RESPIRATION RATE: 16 BRPM | BODY MASS INDEX: 34.57 KG/M2 | WEIGHT: 278 LBS | HEIGHT: 75 IN | TEMPERATURE: 98.4 F

## 2018-11-06 DIAGNOSIS — R20.2 PARESTHESIA OF LOWER LIP: ICD-10-CM

## 2018-11-06 DIAGNOSIS — E78.5 HYPERLIPIDEMIA, UNSPECIFIED HYPERLIPIDEMIA TYPE: ICD-10-CM

## 2018-11-06 DIAGNOSIS — D35.2 PROLACTINOMA (HCC): ICD-10-CM

## 2018-11-06 DIAGNOSIS — E11.65 UNCONTROLLED TYPE 2 DIABETES MELLITUS WITH HYPERGLYCEMIA (HCC): Primary | ICD-10-CM

## 2018-11-06 RX ORDER — ACARBOSE 25 MG/1
25 TABLET ORAL
Qty: 90 TAB | Refills: 5 | Status: SHIPPED | OUTPATIENT
Start: 2018-11-06 | End: 2019-01-31

## 2018-11-06 RX ORDER — CABERGOLINE 0.5 MG/1
TABLET ORAL
Qty: 12 TAB | Refills: 3 | Status: SHIPPED | OUTPATIENT
Start: 2018-11-06 | End: 2019-01-31

## 2018-11-06 RX ORDER — ATORVASTATIN CALCIUM 80 MG/1
80 TABLET, FILM COATED ORAL DAILY
Qty: 90 TAB | Refills: 3 | Status: SHIPPED | OUTPATIENT
Start: 2018-11-06 | End: 2020-07-23

## 2018-11-06 NOTE — PROGRESS NOTES
62 y.o. BLACK OR  male who presents for evaluation. He is back c/o paresthesias in his upper and lower lips, mostly on the right side. Initially noted this late 2017 and he ended up getting opinion drom Dr Hasmukh flowers for cva, he is on asa for prevention. In spring/summer this year, he decided to come off metformin to see if that's what was causing it and he did note resolution of sx. We called in [facility] [place] as replacement but due to cost, he did not take. He opted to start back the metformin and was doing ok until 'it got colder.'  The last few weeks, he's noted return of sx, usually later in the afternoon. When distracted by work or other things, doesn't seem to bother him much. No problems with tongue, sense of taste or smell, focal neuro or cranial nerve sx.       Past Medical History:   Diagnosis Date    Allergic rhinitis     Deviated septum and epistaxis Dr. Marco Parrish 2009     Diabetes mellitus ([facility] [address]     on basis of elev a1c 12/15    ED (erectile dysfunction)     Fatty liver     on US 6/10; Fib-4 was 0.91 from 5/12    H. pylori infection     EGD w dilation Dr Forrest Villasenor 3/15    HSV (herpes simplex virus) infection     Hyperlipidemia     Hypertension     Obesity     peak 292 lbs, bmi 37.8 from 2/14; IF 4/18 start weight 280 lbs    Prediabetes 3/10    2 hr GTT nl     Prolactinoma (HCC)     Dr. Jessica Peralta      Past Surgical History:   Procedure Laterality Date   Shabana Camacho 6/12 negative    HX GI  2/01    negative barium enema    HX ORTHOPAEDIC  11/2015    medial meniscus tear left knee Dr Alyssa Bills  03/2018    CT head neg     Social History     Socioeconomic History    Marital status:      Spouse name: Not on file    Number of children: 2    Years of education: Not on file    Highest education level: Not on file   Social Needs    Financial resource strain: Not on file    Food insecurity - worry: Not on file   [employer] insecurity - inability: Not on file    Transportation needs - medical: Not on file   [employer] needs - non-medical: Not on file   Occupational History    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   Tobacco Use    Smoking status: Never Smoker    Smokeless tobacco: Never Used   Substance and Sexual Activity    Alcohol use: No    Drug use: No    Sexual activity: Not on file   Other Topics Concern    Not on file   Social History Narrative    Not on file     Social History     Socioeconomic History    Marital status:      Spouse name: Not on file    Number of children: 2    Years of education: Not on file    Highest education level: Not on file   Social Needs    Financial resource strain: Not on file    Food insecurity - worry: Not on file    Food insecurity - inability: Not on file   [employer] needs - medical: Not on file   [employer] needs - non-medical: Not on file   Occupational History    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  cabergoline (DOSTINEX) 0.5 mg tablet take 1 tablet by mouth every MONDAY    acarbose (PRECOSE) 25 mg tablet Take 1 Tab by mouth three (3) times daily (with meals).  ezetimibe (ZETIA) 10 mg tablet take 1 tablet by mouth once daily    amLODIPine-benazepril (LOTREL) 5-20 mg per capsule take 1 capsule by mouth once daily    valACYclovir (VALTREX) 1 gram tablet Take 1 Tab by mouth daily.  metFORMIN (GLUCOPHAGE) 500 mg tablet Take  by mouth two (2) times daily (with meals).  metFORMIN ER (GLUCOPHAGE XR) 500 mg tablet Take 2 Tabs by mouth daily (with dinner).     tadalafil (CIALIS) 20 mg tablet Take 1 Tab by mouth as needed.  multivitamin (ONE A DAY) tablet Take 1 Tab by mouth daily.  ascorbic acid, vitamin C, (VITAMIN C) 250 mg tablet Take  by mouth. Pt unsure of dosage    OTHER Vitamin A supplement    glucose blood VI test strips (ONETOUCH VERIO) strip Patient checks blood sugar daily, Dx E11.9    glucose blood VI test strips (ONETOUCH ULTRA TEST) strip Patient to test Blood sugar 1 a day DX: E11.65    SITagliptin (JANUVIA) 100 mg tablet Take 1 Tab by mouth daily. (Patient taking differently: Take 100 mg by mouth daily. Indications: not taking)    omeprazole (PRILOSEC) 20 mg capsule Take 1 Cap by mouth daily. (Patient taking differently: Take 20 mg by mouth daily. Indications: not taking)    meloxicam (MOBIC) 15 mg tablet Take 1 Tab by mouth daily. (Patient taking differently: Take 15 mg by mouth daily. Indications: not taking)     No current facility-administered medications for this visit. Allergies   Allergen Reactions    Hydrochlorothiazide Other (comments)     Weight loss, pt unsure of allergy       REVIEW OF SYSTEMS:   Ophtho - no vision change or eye pain  Oral - no mouth pain, tongue or tooth problems  Ears - no hearing loss, ear pain, fullness, no swallowing problems  Cardiac - no CP, PND, orthopnea, edema, palpitations or syncope  Chest - no breast masses  Resp - no wheezing, chronic coughing, dyspnea  GI - no heartburn, nausea, vomiting, change in bowel habits, bleeding, hemorrhoids  Urinary - no dysuria, hematuria, flank pain, urgency, frequency  Genitals - no genital lesions, discharge, masses, ulceration, warts  Ortho - no swelling, dec ROM, myalgias    Visit Vitals  /72   Pulse 89   Temp 98.4 °F (36.9 °C) (Oral)   Resp 16   Ht 6' 3\" (1.905 m)   Wt 278 lb (126.1 kg)   SpO2 97%   BMI 34.75 kg/m²   A&O x3  Affect is appropriate. Mood stable  No apparent distress  Anicteric, no JVD, adenopathy or thyromegaly.    No carotid bruits or radiated murmur  Lungs clear to auscultation, no wheezes or rales  Heart showed regular rate and rhythm. No murmur, rubs, gallops  Abdomen soft nontender, no hepatosplenomegaly or masses. Extremities without edema. Pulses 1-2+ symmetrically  Cn 2-12 intact, neuro exam otherwise nonfocal    Assessment and plan:  1. Paresthesia perioral etiology unclear. Will stop metformin again and try to get opinion from ENT  2. DM. Change to acarbose, sfx discussed, he has routine f/u in 1-2 months      Above conditions discussed at length and patient vocalized understanding.   All questions answered to patient satisfaction

## 2018-11-06 NOTE — PROGRESS NOTES
1. Have you been to the ER, urgent care clinic or hospitalized since your last visit? NO.     2. Have you seen or consulted any other health care providers outside of the [facility] since your last visit (Include any pap smears or colon screening)? NO      Do you have an Advanced Directive? NO    Would you like information on Advanced Directives?  NO  Chief Complaint   Patient presents with    Other     burning senation on lip

## 2019-01-07 ENCOUNTER — OFFICE VISIT (OUTPATIENT)
Dept: INTERNAL MEDICINE CLINIC | Age: 59
End: 2019-01-07

## 2019-01-07 VITALS
OXYGEN SATURATION: 99 % | HEART RATE: 73 BPM | DIASTOLIC BLOOD PRESSURE: 85 MMHG | HEIGHT: 75 IN | RESPIRATION RATE: 14 BRPM | WEIGHT: 285 LBS | SYSTOLIC BLOOD PRESSURE: 144 MMHG | TEMPERATURE: 98.3 F | BODY MASS INDEX: 35.43 KG/M2

## 2019-01-07 DIAGNOSIS — I10 ESSENTIAL HYPERTENSION: ICD-10-CM

## 2019-01-07 DIAGNOSIS — D35.2 PROLACTINOMA (HCC): ICD-10-CM

## 2019-01-07 DIAGNOSIS — E66.01 SEVERE OBESITY (BMI 35.0-39.9) WITH COMORBIDITY (HCC): ICD-10-CM

## 2019-01-07 DIAGNOSIS — E11.65 UNCONTROLLED TYPE 2 DIABETES MELLITUS WITH HYPERGLYCEMIA (HCC): ICD-10-CM

## 2019-01-07 DIAGNOSIS — R51.9 NONINTRACTABLE HEADACHE, UNSPECIFIED CHRONICITY PATTERN, UNSPECIFIED HEADACHE TYPE: Primary | ICD-10-CM

## 2019-01-07 NOTE — PROGRESS NOTES
62 y.o. BLACK OR  male who presents for evaluation. Over the last 2 weeks, he has noted recurring sharp headache mostly on the right side. Started 2 weeks ago,  but over the last week or so, has been having more attacks. Describes it as a sharp pain that lasts about 5 seconds starts in the temple, travels to behind the eye. He can have multiple attacks in the day. Knows of no triggers, no prior history of migraine. No focal neurologic complaints, actual vision change, jaw claudication, ear symptoms. No neck stiffness, photophobia, fevers. His sinuses are stable. 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 He saw ophthalmology Dr. Arun Woodard roughly a month or so ago, was told everything was okay. No symptoms from the neck down otherwise. He has not had any head trauma. He has not been following his blood pressure but no recent elevated readings on review.     Past Medical History:   Diagnosis Date    Allergic rhinitis     Deviated septum and epistaxis Dr. Andres Vasquez 2009     Diabetes mellitus ([facility] [address]     on basis of elev a1c 12/15    ED (erectile dysfunction)     Fatty liver     on US 6/10; Fib-4 was 0.91 from 5/12    H. pylori infection     EGD w dilation Dr De La Rosa Spindle 3/15    HSV (herpes simplex virus) infection     Hyperlipidemia     Hypertension     Obesity     peak 292 lbs, bmi 37.8 from 2/14; IF 4/18 start weight 280 lbs    Prediabetes 3/10    2 hr GTT nl     Prolactinoma (HCC)     Dr. Bulah Jeans      Past Surgical History:   Procedure Laterality Date   Makenna Carbajal 6/12 negative    HX GI  2/01    negative barium enema    HX ORTHOPAEDIC  11/2015    medial meniscus tear left knee Dr Emily Arvizu  03/2018    CT head neg     Social History     Socioeconomic History    Marital status:      Spouse name: Not on file    Number of children: 2    Years of education: Not on file    Highest education level: Not on file   Social Needs    Financial resource strain: Not on file    Food insecurity - worry: Not on file    Food insecurity - inability: Not on file   [employer] needs - medical: Not on file   [employer] needs - non-medical: Not on file   Occupational History    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  cabergoline (DOSTINEX) 0.5 mg tablet take 1 tablet by mouth every MONDAY    ezetimibe (ZETIA) 10 mg tablet take 1 tablet by mouth once daily    amLODIPine-benazepril (LOTREL) 5-20 mg per capsule take 1 capsule by mouth once daily    valACYclovir (VALTREX) 1 gram tablet Take 1 Tab by mouth daily.  tadalafil (CIALIS) 20 mg tablet Take 1 Tab by mouth as needed.  multivitamin (ONE A DAY) tablet Take 1 Tab by mouth daily.  ascorbic acid, vitamin C, (VITAMIN C) 250 mg tablet Take  by mouth. Pt unsure of dosage    OTHER Vitamin A supplement    glucose blood VI test strips (ONETOUCH VERIO) strip Patient checks blood sugar daily, Dx E11.9    glucose blood VI test strips (ONETOUCH ULTRA TEST) strip Patient to test Blood sugar 1 a day DX: E11.65    acarbose (PRECOSE) 25 mg tablet Take 1 Tab by mouth three (3) times daily (with meals).  metFORMIN ER (GLUCOPHAGE XR) 500 mg tablet Take 2 Tabs by mouth daily (with dinner). (Patient taking differently: Take 1,000 mg by mouth daily (with dinner). )    SITagliptin (JANUVIA) 100 mg tablet Take 1 Tab by mouth daily. (Patient taking differently: Take 100 mg by mouth daily. Indications: not taking)    omeprazole (PRILOSEC) 20 mg capsule Take 1 Cap by mouth daily.  (Patient taking differently: Take 20 mg by mouth daily. Indications: not taking)    meloxicam (MOBIC) 15 mg tablet Take 1 Tab by mouth daily. (Patient taking differently: Take 15 mg by mouth daily. Indications: not taking)     No current facility-administered medications for this visit. Allergies   Allergen Reactions    Hydrochlorothiazide Other (comments)     Weight loss, pt unsure of allergy       REVIEW OF SYSTEMS:   Ophtho - no vision change or eye pain  Oral - no mouth pain, tongue or tooth problems  Ears - no hearing loss, ear pain, fullness, no swallowing problems  Cardiac - no CP, PND, orthopnea, edema, palpitations or syncope  Chest - no breast masses  Resp - no wheezing, chronic coughing, dyspnea  GI - no heartburn, nausea, vomiting, change in bowel habits, bleeding, hemorrhoids  Urinary - no dysuria, hematuria, flank pain, urgency, frequency  Genitals - no genital lesions, discharge, masses, ulceration, warts  Ortho - no swelling, dec ROM, myalgias    Visit Vitals  /85   Pulse 73   Temp 98.3 °F (36.8 °C) (Oral)   Resp 14   Ht 6' 3\" (1.905 m)   Wt 285 lb (129.3 kg)   SpO2 99%   BMI 35.62 kg/m²   Tympanic membrane's normal.  Disks are sharp bilaterally, PERRLA, EOMI. Sinuses nontender, no temporal area tenderness. Oropharynx otherwise benign, no sinus tenderness on percussion. Lungs are clear. Heart showed regular rate rhythm. Abdomen soft and nontender obese. Neurologic exam grossly nonfocal    Assessment and plan:  1. Sharp episodic headache etiology unclear. Nonfocal exam.  We discussed doing imaging study including CT with contrast versus MRI, not sure this will be covered by insurance. He declined for now, he will go to the ER if with recurring symptoms. Also declined neuro consult. 2.  Elevated blood pressure. He wants to follow and not make any adjustments at this time. Call with worsening symptoms        Above conditions discussed at length and patient vocalized understanding.   All questions answered to patient satisfaction

## 2019-01-07 NOTE — PROGRESS NOTES
1. Have you been to the ER, urgent care clinic or hospitalized since your last visit? NO.     2. Have you seen or consulted any other health care providers outside of the [address] [place] [address] since your last visit (Include any pap smears or colon screening)? NO      Do you have an Advanced Directive? NO    Would you like information on Advanced Directives?  NO

## 2019-01-19 ENCOUNTER — HOSPITAL ENCOUNTER (OUTPATIENT)
Dept: LAB | Age: 59
Discharge: HOME | End: 2019-01-19
Payer: COMMERCIAL

## 2019-01-19 DIAGNOSIS — Z00.00 PHYSICAL EXAM: ICD-10-CM

## 2019-01-19 LAB
CHOLEST SERPL-MCNC: 156 MG/DL
CREAT UR-MCNC: 157 MG/DL (ref 30–125)
HDLC SERPL-MCNC: 99 MG/DL (ref 40–60)
HDLC SERPL: 1.6 {RATIO} (ref 0–5)
LDLC SERPL CALC-MCNC: 47.6 MG/DL (ref 0–100)
LIPID PROFILE,FLP: ABNORMAL
MICROALBUMIN UR-MCNC: <0.5 MG/DL (ref 0–3)
MICROALBUMIN/CREAT UR-RTO: ABNORMAL MG/G (ref 0–30)
PSA SERPL-MCNC: 4.4 NG/ML (ref 0–4)
TRIGL SERPL-MCNC: 47 MG/DL (ref ?–150)
VLDLC SERPL CALC-MCNC: 9.4 MG/DL

## 2019-01-19 PROCEDURE — 36415 COLL VENOUS BLD VENIPUNCTURE: CPT

## 2019-01-19 PROCEDURE — 82043 UR ALBUMIN QUANTITATIVE: CPT

## 2019-01-19 PROCEDURE — 80061 LIPID PANEL: CPT

## 2019-01-19 PROCEDURE — 84153 ASSAY OF PSA TOTAL: CPT

## 2019-01-24 ENCOUNTER — TELEPHONE (OUTPATIENT)
Dept: INTERNAL MEDICINE CLINIC | Age: 59
End: 2019-01-24

## 2019-01-24 DIAGNOSIS — R97.20 ELEVATED PSA: Primary | ICD-10-CM

## 2019-01-24 NOTE — TELEPHONE ENCOUNTER
pls call    Results for orders placed or performed during the hospital encounter of 01/19/19   LIPID PANEL   Result Value Ref Range    LIPID PROFILE          Cholesterol, total 156 <200 MG/DL    Triglyceride 47 <150 MG/DL    HDL Cholesterol 99 (H) 40 - 60 MG/DL    LDL, calculated 47.6 0 - 100 MG/DL    VLDL, calculated 9.4 MG/DL    CHOL/HDL Ratio 1.6 0 - 5.0     MICROALBUMIN, UR, RAND W/ MICROALB/CREAT RATIO   Result Value Ref Range    Microalbumin,urine random <0.50 0 - 3.0 MG/DL    Creatinine, urine 157.00 (H) 30 - 125 mg/dL    Microalbumin/Creat ratio (mg/g creat)  0 - 30 mg/g     Cannot calculate ratio due to microalbumin result outside reportable range.    PSA, DIAGNOSTIC (PROSTATE SPECIFIC AG)   Result Value Ref Range    Prostate Specific Ag 4.4 (H) 0.0 - 4.0 ng/mL     psa elev  sched eval dr David Yu pls

## 2019-01-25 ENCOUNTER — TELEPHONE (OUTPATIENT)
Dept: INTERNAL MEDICINE CLINIC | Age: 59
End: 2019-01-25

## 2019-02-14 NOTE — PROGRESS NOTES
61 y.o. BLACK OR  male who presents for f/u    He stopped the metformin and the perioral paresthesias have gone. Tried to change him to [place] [facility] [place] but too expensive. We gave him precose after the last regular visit but he also felt perioral sx so stopped it. Never was called by ENT for opinion apparently. He has maintained the current dietary approach, trying to stick to a 6a to 2p window most days. Denies polyuria, polydipsia, nocturia, vision change. Weight is really unchanged    Vitals 2/18/2019 1/31/2019 1/7/2019 11/6/2018 8/17/2018   Weight 277 lb 3.2 oz 286 lb 285 lb 278 lb 278 lb     Denies any cardiovascular complaints. Not much exercise    Denied any gi complaints    Saw Dr Nito Beckford for the [facility], they are planning on doing bx in the near future.   He now reports at least 2 bros with prostate ca    IF 4/18    Past Medical History:   Diagnosis Date    Allergic rhinitis     Cardiac arrhythmia     Claustrophobia     Deviated septum and epistaxis Dr. Everardo Pantoja 2009     Diabetes mellitus ([facility] [address]     on basis of elev a1c 12/15    ED (erectile dysfunction)     Fatty liver     on US 6/10; Fib-4 was 0.91 from 5/12    FHx: prostate cancer     2 bros    H. pylori infection     EGD w dilation Dr Sher Borja 3/15    HSV (herpes simplex virus) infection     Hypercholesteremia     Hyperlipidemia     Hypertension     Obesity     peak 292 lbs, bmi 37.8 from 2/14; IF 4/18 start weight 280 lbs    Prediabetes 3/10    2 hr GTT nl     Prolactinoma ([facility] [address]     Dr. Mariebll Ly Sickle cell anemia [facility])      Past Surgical History:   Procedure Laterality Date    HX COLONOSCOPY      Dr. Lizbeth Umaña 6/12 negative    HX CYSTECTOMY      HX GI  2/01    negative barium enema    HX ORTHOPAEDIC  11/2015    medial meniscus tear left knee Dr Jc Hodgson  03/2018    CT head neg     Social History     Socioeconomic History    Marital status:      Spouse name: Not on file    Number of children: 2    Years of education: Not on file    Highest education level: Not on file   Social Needs    Financial resource strain: Not on file    Food insecurity - worry: Not on file    Food insecurity - inability: Not on file    Transportation needs - medical: Not on file   [employer] needs - non-medical: Not on file   Occupational History    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  ezetimibe (ZETIA) 10 mg tablet take 1 tablet by mouth once daily    amLODIPine-benazepril (LOTREL) 5-20 mg per capsule take 1 capsule by mouth once daily    valACYclovir (VALTREX) 1 gram tablet Take 1 Tab by mouth daily.  glucose blood VI test strips (ONETOUCH VERIO) strip Patient checks blood sugar daily, Dx E11.9    glucose blood VI test strips (ONETOUCH ULTRA TEST) strip Patient to test Blood sugar 1 a day DX: E11.65    tadalafil (CIALIS) 20 mg tablet Take 1 Tab by mouth as needed.  multivitamin (ONE A DAY) tablet Take 1 Tab by mouth daily.  OTHER Vitamin A supplement     No current facility-administered medications for this visit.       REVIEW OF SYSTEMS: colo 6/12 Dr Tim Patel  Ophtho - no vision change or eye pain  Oral - no mouth pain, tongue or tooth problems  Ears - no hearing loss, ear pain, fullness, no swallowing problems  Cardiac - no CP, PND, orthopnea, edema, palpitations or syncope  Chest - no breast masses  Resp - no wheezing, chronic coughing, dyspnea  GI - no heartburn, nausea, vomiting, change in bowel habits, bleeding, hemorrhoids  Urinary - no dysuria, hematuria, flank pain, urgency, frequency  Constitutional - no wt loss, night sweats, unexplained fevers  Neuro - no focal weakness, numbness, paresthesias, incoordination, ataxia, involuntary movements  Endo - no polyuria, polydipsia, nocturia, hot flashes    Visit Vitals  /84 (BP 1 Location: Right arm, BP Patient Position: Sitting)   Pulse (!) 108   Temp 98 °F (36.7 °C) (Oral)   Resp 14   Ht 6' 3\" (1.905 m)   Wt 277 lb 3.2 oz (125.7 kg)   SpO2 98%   BMI 34.65 kg/m²     A&O x3  Affect is appropriate. Mood stable  No apparent distress  Anicteric, no JVD, adenopathy or thyromegaly. No carotid bruits or radiated murmur  Lungs clear to auscultation, no wheezes or rales  Heart showed regular rate and rhythm. No murmur, rubs, gallops  Abdomen soft nontender, no hepatosplenomegaly or masses. Extremities without edema.   Pulses 1-2+ symmetrically    LABS  From 2/10 showed    gluc 111, cr 1.10, gfr>60, alt 51, hba1c 6.0,                   chol 278, tg 122,  hdl 56, ldl-c 198, wbc 3.2, hb 13.8, plt 230,    psa 0.60  From 3/10 showed                                 2hr GTT 88  From 6/10 showed    gluc 105, cr 1.30, gfr>60, alt 59,                              wbc 3.6, hb 13.9, plt 237  From 10/11 showed  gluc 102, cr 1.18, gfr 82,  alt 22, hba1c 6.5,                             wbc 3.5, hb 13.5, plt 255,               psa 1.20,  prl 30.8  From 11/11 showed                ldl-p 1245,                        2 hr GTT 89  From 2/12 showed                                ua neg  From 5/12 showed    gluc 109, cr 1.10, gfr>60, alt 17, hba1c 6.1,                   chol 224, tg 70,   hdl 70, ldl-c 140, wbc 3.8, hb 14.3, plt 250,    psa 1.08  From 10/12 showed  gluc 98,   cr 1.00, gfr>60, alt 23, hba1c 6.2, ldl-p 1647, chol 216, tg 64,   hdl 63, ldl-c 148  From 1/14 showed    gluc 99,    cr 1.30, gfr 58,  alt 31, hba1c 6.4, chol 196, tg 81,   hdl 67, ldl-c 113  From 8/14 showed    gluc 100,  cr 1.20, gfr>60, alt 19, hba1c 6.2,      chol 288, tg 140, hdl 69, ldl-c 191         umar neg  From 9/14 showed                       tsh 1.84  From 2/15 showed    gluc 118, cr 1.34, gfr 68,  alt 19, hba1c 6.4,      chol 301, tg 190, hdl 63, ldl-c 200,         umar neg   From 8/15 showed         hba1c 6.3,      chol 267, tg 90,   hdl 70, ldl-c 179  From 12/15 showed  gluc 102, cr 1.29, gfr>60,     hba1c 6.8,               umar neg  From 3/16 showed    gluc 96,   cr 1.23, gfr>60,                   wbc 4.1, hb 13.3, plt 240  From 10/16 showed  gluc 110, cr 1.13, gfr>60, alt 29, hba1c 6.4,      chol 233, tg 120, hdl 73, ldl-c 136  From 9/17 showed    gluc 99,   cr 1.25, gfr>60, alt 29, hba1c 6.5,      chol 240, tg 93,   hdl 79, ldl-c 142, wbc 3.9, hb 14.4, plt 217, umar neg,    psa 3.80  From 3/18 showed    gluc 103, cr 1.30, gfr>60, alt 27, hba1c 6.3,      chol 251, tg 96,   hdl 88, ldl-c 144, tsh 2.69,                psa 3.70, b12 559, fol 14.1, free t4 1.20, hiv neg, rpr neg, hep b/c neg  From 1/19 showed            chol 156, tg 47,   hdl 99, ldl-c 48,         umar neg    Patient Active Problem List   Diagnosis Code    Prolactin microadenoma Dr. Diann Alvarado D35.2    Hyperlipidemia E78.5    Essential hypertension I10    Diabetes mellitus type 2, uncontrolled ([facility] [address] E11.65    Erectile dysfunction N52.9    Severe obesity (BMI 35.0-39. 9) with comorbidity ([facility] [address] E66.01     Assessment and plan:  1. Ortho. F/U Dr Claudeen Hung  2. Obesity. Lifestyle and dietary measures, previously declined appetite supp. Trying to stick with IF  3. DM. Off meds at this time, a1c drawn today  4. Erectile dysfunction. Refilled cialis  5. Prolactinoma. Continue dostinex  6. HLP. Continue current regimen. 7.  HTN. Continue current regimen. 8.  Elev psa. Per Dr Randolph Donohue      RTC 8/19    Above conditions discussed at length and patient vocalized understanding.   All questions answered to patient satisfaction

## 2019-02-18 ENCOUNTER — HOSPITAL ENCOUNTER (OUTPATIENT)
Dept: LAB | Age: 59
Discharge: HOME | End: 2019-02-18
Payer: COMMERCIAL

## 2019-02-18 ENCOUNTER — OFFICE VISIT (OUTPATIENT)
Dept: INTERNAL MEDICINE CLINIC | Age: 59
End: 2019-02-18

## 2019-02-18 VITALS
SYSTOLIC BLOOD PRESSURE: 140 MMHG | RESPIRATION RATE: 14 BRPM | OXYGEN SATURATION: 98 % | BODY MASS INDEX: 34.47 KG/M2 | TEMPERATURE: 98 F | WEIGHT: 277.2 LBS | HEART RATE: 108 BPM | HEIGHT: 75 IN | DIASTOLIC BLOOD PRESSURE: 84 MMHG

## 2019-02-18 DIAGNOSIS — E66.01 SEVERE OBESITY (BMI 35.0-39.9) WITH COMORBIDITY (HCC): ICD-10-CM

## 2019-02-18 DIAGNOSIS — I10 ESSENTIAL HYPERTENSION: ICD-10-CM

## 2019-02-18 DIAGNOSIS — D35.2 PROLACTINOMA (HCC): ICD-10-CM

## 2019-02-18 DIAGNOSIS — Z00.00 ROUTINE GENERAL MEDICAL EXAMINATION AT A HEALTH CARE FACILITY: ICD-10-CM

## 2019-02-18 DIAGNOSIS — E78.5 HYPERLIPIDEMIA, UNSPECIFIED HYPERLIPIDEMIA TYPE: ICD-10-CM

## 2019-02-18 DIAGNOSIS — E11.65 UNCONTROLLED TYPE 2 DIABETES MELLITUS WITH HYPERGLYCEMIA (HCC): Primary | ICD-10-CM

## 2019-02-18 LAB
EST. AVERAGE GLUCOSE BLD GHB EST-MCNC: 154 MG/DL
HBA1C MFR BLD: 7 % (ref 4.2–5.6)
T PALLIDUM AB SER QL IA: NONREACTIVE

## 2019-02-18 PROCEDURE — 86706 HEP B SURFACE ANTIBODY: CPT

## 2019-02-18 PROCEDURE — 87340 HEPATITIS B SURFACE AG IA: CPT

## 2019-02-18 PROCEDURE — 87389 HIV-1 AG W/HIV-1&-2 AB AG IA: CPT

## 2019-02-18 PROCEDURE — 86803 HEPATITIS C AB TEST: CPT

## 2019-02-18 PROCEDURE — 83036 HEMOGLOBIN GLYCOSYLATED A1C: CPT

## 2019-02-18 PROCEDURE — 86780 TREPONEMA PALLIDUM: CPT

## 2019-02-18 NOTE — PROGRESS NOTES
Chief Complaint   Patient presents with    Cholesterol Problem     6 month follow up with lab review. Health Maintenance Due   Topic Date Due    FOOT EXAM Q1  02[birth year]01/1970    Shingrix Vaccine Age 50> (1 of 2) 02/01/2010    Influenza Age 5 to Adult  08/01/2018    HEMOGLOBIN A1C Q6M  09/22/2018     Lab blood drawn from right antecubital area. Patient tolerated well. 1. Have you been to the ER, urgent care clinic or hospitalized since your last visit? YES. Patient states he went to [facility] ER for chills in his chest Feb. 8, 2019.    2. Have you seen or consulted any other health care providers outside of the [address] [place] [address] since your last visit (Include any pap smears or colon screening)?  NO

## 2019-02-19 ENCOUNTER — TELEPHONE (OUTPATIENT)
Dept: INTERNAL MEDICINE CLINIC | Age: 59
End: 2019-02-19

## 2019-02-19 DIAGNOSIS — E11.65 UNCONTROLLED TYPE 2 DIABETES MELLITUS WITH HYPERGLYCEMIA (HCC): Primary | ICD-10-CM

## 2019-02-19 LAB
HBV SURFACE AB SER QL IA: NEGATIVE
HBV SURFACE AB SERPL IA-ACNC: 5.99 MIU/ML
HBV SURFACE AG SER QL: <0.1 INDEX
HBV SURFACE AG SER QL: NEGATIVE
HCV AB SER IA-ACNC: 0.05 INDEX
HCV AB SERPL QL IA: NEGATIVE
HCV COMMENT,HCGAC: NORMAL
HEP BS AB COMMENT,HBSAC: ABNORMAL
HIV 1+2 AB+HIV1 P24 AG SERPL QL IA: NONREACTIVE
HIV12 RESULT COMMENT, HHIVC: NORMAL

## 2019-02-20 ENCOUNTER — TELEPHONE (OUTPATIENT)
Dept: INTERNAL MEDICINE CLINIC | Age: 59
End: 2019-02-20

## 2019-02-20 NOTE — TELEPHONE ENCOUNTER
pls call      Results for orders placed or performed during the hospital encounter of 02/18/19   HEP B SURFACE AB   Result Value Ref Range    Hepatitis B surface Ab 5.99 (L) >10.0 mIU/mL    Hep B surface Ab Interp. NEGATIVE  (A) POS      Hep B surface Ab comment        Samples with a  value of 10 mIU/mL or greater are considered positive (protective immunity) in accordance with the CDC guidelines. HEP B SURFACE AG   Result Value Ref Range    Hepatitis B surface Ag <0.10 <1.00 Index    Hep B surface Ag Interp. NEGATIVE  NEG     HEPATITIS C AB   Result Value Ref Range    Hepatitis C virus Ab 0.05 <0.80 Index    Hep C  virus Ab Interp. NEGATIVE  NEG      Hep C  virus Ab comment         HIV 1/2 AG/AB, 4TH GENERATION,W RFLX CONFIRM   Result Value Ref Range    HIV 1/2 Interpretation NONREACTIVE NR      HIV 1/2 result comment SEE NOTE     HEMOGLOBIN A1C WITH EAG   Result Value Ref Range    Hemoglobin A1c 7.0 (H) 4.2 - 5.6 %    Est. average glucose 154 mg/dL   T PALLIDUM AB   Result Value Ref Range    T. pallidum interpretation.  NONREACTIVE NR       Std testing neg  a1c up to 7.0    Will need to start med - trial januvia 100 every day - called in    sched f/u 4 mos w labs

## 2019-04-04 ENCOUNTER — TELEPHONE (OUTPATIENT)
Dept: INTERNAL MEDICINE CLINIC | Age: 59
End: 2019-04-04

## 2019-04-04 DIAGNOSIS — C61 PROSTATE CANCER (HCC): Primary | ICD-10-CM

## 2019-04-04 NOTE — TELEPHONE ENCOUNTER
PT wants a 2nd opinion re:his prostate- the dr he was referred  To wants to do surgery and  he wants a 2nd opinion

## 2019-04-04 NOTE — TELEPHONE ENCOUNTER
Dr. Vangie Crowley, Pt was seen at [facility], who do you recommend for a 2nd opinion?

## 2019-04-09 ENCOUNTER — OFFICE VISIT (OUTPATIENT)
Dept: UROLOGY | Age: 59
End: 2019-04-09

## 2019-04-09 VITALS
BODY MASS INDEX: 34.44 KG/M2 | HEIGHT: 75 IN | DIASTOLIC BLOOD PRESSURE: 83 MMHG | OXYGEN SATURATION: 99 % | WEIGHT: 277 LBS | SYSTOLIC BLOOD PRESSURE: 159 MMHG | HEART RATE: 78 BPM

## 2019-04-09 DIAGNOSIS — C61 CARCINOMA OF PROSTATE (HCC): Primary | ICD-10-CM

## 2019-04-10 NOTE — PROGRESS NOTES
Rodolfo Esposito 61 y.o. male     Mr. Shahla Horn seen today for urologic opinion regarding management of recently diagnosed prostate carcinoma    Family history significant for prostate carcinoma in 3 of patient's older brothers-one brother dying in his 76s from metastatic prostate carcinoma  And 2 other brothers alive and well following treatment by brachii therapy and radical prostatectomy     prostate biopsy in this case prompted by finding PSA 4.4 on routine testing    PSA 3.8 in 2017  PSA 3.7 in 2018  PSA 4.4 in February 2019           transrectal prostate biopsy on 27 February 2020 19+ for [place] 6 adenocarcinoma in 6 of 12 cores/volume 45.2 cc PSA density 0.09    Review of Systems:   CNS: No seizures syncope  Dizziness/ + headache /blurred vision  Prolactinoma respiratory:   Cardiovascular: Hypertension no angina no palpitations  Intestinal: No dyspepsia diarrhea or constipation  Urinary: Mild urgency no dysuria no hematuria  Skeletal: No bone or joint pain   Endocrine: Diabetes no thyroid disease  Other:    Allergies: Allergies   Allergen Reactions    Hydrochlorothiazide Other (comments)     Weight loss, pt unsure of allergy      Metformin Other (comments)     paresthesia      Medications:    Current Outpatient Medications   Medication Sig Dispense Refill    SITagliptin (JANUVIA) 100 mg tablet Take 1 Tab by mouth daily. 30 Tab 11    atorvastatin (LIPITOR) 80 mg tablet Take 1 Tab by mouth daily. 90 Tab 3    ezetimibe (ZETIA) 10 mg tablet take 1 tablet by mouth once daily 30 Tab 11    amLODIPine-benazepril (LOTREL) 5-20 mg per capsule take 1 capsule by mouth once daily 90 Cap 3    multivitamin (ONE A DAY) tablet Take 1 Tab by mouth daily.       OTHER Vitamin A supplement      glucose blood VI test strips (ONETOUCH VERIO) strip Patient checks blood sugar daily, Dx E11.9 100 Strip 3    glucose blood VI test strips (ONETOUCH ULTRA TEST) strip Patient to test Blood sugar 1 a day DX: E11.65 100 Strip 3    valACYclovir (VALTREX) 1 gram tablet Take 1 Tab by mouth daily. 90 Tab 3    tadalafil (CIALIS) 20 mg tablet Take 1 Tab by mouth as needed.  27 Tab 11       Past Medical History:   Diagnosis Date    Allergic rhinitis     Blurred vision     BPH (benign prostatic hyperplasia)     Cardiac arrhythmia     Claustrophobia     Deviated septum and epistaxis Dr. Kristen Hernandez 2009     Diabetes mellitus ([address]     on basis of elev a1c 12/15    ED (erectile dysfunction)     Elevated PSA     Fatty liver     on US 6/10; Fib-4 was 0.91 from 5/12    FHx: prostate cancer     2 bros    H. pylori infection     EGD w dilation Dr Henna Jaffe 3/15    HSV (herpes simplex virus) infection     Hypercholesteremia     Hyperlipidemia     Hypertension     Obesity     peak 292 lbs, bmi 37.8 from 2/14; IF 4/18 start weight 280 lbs    Prediabetes 3/10    2 hr GTT nl     Prolactinoma (HCC)     Dr. Amador Montiel Sickle cell anemia [facility])       Past Surgical History:   Procedure Laterality Date    HX COLONOSCOPY      Dr. Zeeshan Askew 6/12 negative    HX CYSTECTOMY      HX GI  2/01    negative barium enema    HX ORTHOPAEDIC  11/2015    medial meniscus tear left knee Dr Sara Siddiqui  03/2018    CT head neg     Social History     Socioeconomic History    Marital status:      Spouse name: Not on file    Number of children: 2    Years of education: Not on file    Highest education level: Not on file   Occupational History    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   Social Needs    Financial resource strain: Not on file    Food insecurity:     Worry: Not on file     Inability: Not on file    Transportation needs:     Medical: Not on file     Non-medical: Not on file   Tobacco Use    Smoking status: Never Smoker    Smokeless tobacco: Never Used   Substance and Sexual Activity    Alcohol use: No    Drug use: No    Sexual activity: Yes   Lifestyle    Physical activity:     Days per week: Not on file     Minutes per session: Not on file    Stress: Not on file   Relationships    Social connections:     Talks on phone: Not on file     Gets together: Not on file     Attends Oriental orthodox service: Not on file     Active member of club or organization: Not on file     Attends meetings of clubs or organizations: Not on file     Relationship status: Not on file    Intimate partner violence:     Fear of current or ex partner: Not on file     Emotionally abused: Not on file     Physically abused: Not on file     Forced sexual activity: Not on file   Other Topics Concern    Not on file   Social History Narrative    Not on file      Family History   Problem Relation Age of Onset    Hypertension Mother     Hypertension Father     Stroke Father     Diabetes Sister     Cancer Brother     Prostate Cancer Brother         Physical Examination: Well-nourished mature male in no apparent distress    Physical exam genitalia prostate declined      Urinalysis: No specimen today      Prostate Biopsy Pathology Report: Bilateral disease-6 of 12 cores positive     Impression: [place] 6 adenocarcinoma the prostate/PSA 4.4/strong family history of prostate malignancy/age 61    Plan: Bone scan and CT scan imaging of the abdomen and pelvis not indicated because of low Hopkins grade and low PSA level  1. Described and discussed definitive treatment options - prostatectomy by open or laparoscopic technique, radioactive seed implantation, radiation by external beam, proton beam or Cryotherapy. 2. Pros/Cons of definative treatment for prostate cancer described  3. Refer to the prostate cancer support groups \"US TOO\" and \"Man to Man\"  4. Prostate cancer book provided   5. Referral to radiation oncology  (Dr. Karen Campos )is strongly recommended for evaluation and consideration is a candidate for definitive radiation therapy by proton beam, brachytherapy, or      external beam Rx  6.   Referral to urologic oncology (Dr. Mercedes Rivero) is strongly recommended for evaluation and consideration is a candidate for da Shiela laparoscopic prostatectomy-or definitive cryotherapy  7-described/  discussed prospect of \"Watchful Waiting\"or active surveillance-this approach is a  reasonable consideration for elderly patients in poor general health but is not a reasonable recommendation for a young male in otherwise good health, especially one having such a strong family history of aggressive prostate carcinoma-I strongly urged patient not to consider watchful waiting as a reasonable alternative to definitive treatment at this time    RTC PRN     Toni Schmitz MD  -electronically signed-    PLEASE NOTE:  This document has been produced using voice recognition software. Unrecognized errors in transcription may be present.

## 2019-06-14 DIAGNOSIS — E78.5 HYPERLIPIDEMIA, UNSPECIFIED HYPERLIPIDEMIA TYPE: ICD-10-CM

## 2019-06-17 RX ORDER — ATORVASTATIN CALCIUM 80 MG/1
TABLET, FILM COATED ORAL
Qty: 90 TAB | Refills: 3 | Status: SHIPPED | OUTPATIENT
Start: 2019-06-17 | End: 2019-07-17 | Stop reason: SDUPTHER

## 2019-07-17 ENCOUNTER — OFFICE VISIT (OUTPATIENT)
Dept: INTERNAL MEDICINE CLINIC | Age: 59
End: 2019-07-17

## 2019-07-17 VITALS
RESPIRATION RATE: 14 BRPM | WEIGHT: 267 LBS | TEMPERATURE: 98.2 F | OXYGEN SATURATION: 100 % | DIASTOLIC BLOOD PRESSURE: 80 MMHG | BODY MASS INDEX: 33.2 KG/M2 | HEIGHT: 75 IN | SYSTOLIC BLOOD PRESSURE: 136 MMHG | HEART RATE: 77 BPM

## 2019-07-17 DIAGNOSIS — E66.01 SEVERE OBESITY (BMI 35.0-39.9) WITH COMORBIDITY (HCC): Primary | ICD-10-CM

## 2019-07-17 DIAGNOSIS — E78.5 HYPERLIPIDEMIA, UNSPECIFIED HYPERLIPIDEMIA TYPE: ICD-10-CM

## 2019-07-17 DIAGNOSIS — E11.65 UNCONTROLLED TYPE 2 DIABETES MELLITUS WITH HYPERGLYCEMIA (HCC): ICD-10-CM

## 2019-07-17 DIAGNOSIS — R20.2 FACIAL PARESTHESIA: ICD-10-CM

## 2019-07-17 DIAGNOSIS — D35.2 PROLACTINOMA (HCC): ICD-10-CM

## 2019-07-17 DIAGNOSIS — R09.89 GLOBUS SENSATION: ICD-10-CM

## 2019-07-17 DIAGNOSIS — J35.8 TONSILLITH: ICD-10-CM

## 2019-07-17 DIAGNOSIS — I10 ESSENTIAL HYPERTENSION: ICD-10-CM

## 2019-07-17 NOTE — PROGRESS NOTES
Lavonne Santana presents today for   Chief Complaint   Patient presents with    Hypertension     6 month follow up              Depression Screening:  3 most recent PHQ Screens 7/17/2019   Little interest or pleasure in doing things Not at all   Feeling down, depressed, irritable, or hopeless Not at all   Total Score PHQ 2 0       Learning Assessment:  Learning Assessment 7/17/2019   PRIMARY LEARNER Patient   HIGHEST LEVEL OF EDUCATION - PRIMARY LEARNER  GRADUATED HIGH SCHOOL OR GED   BARRIERS PRIMARY LEARNER NONE   CO-LEARNER CAREGIVER No   PRIMARY LANGUAGE ENGLISH   LEARNER PREFERENCE PRIMARY DEMONSTRATION     READING     LISTENING     PICTURES     VIDEOS     DEMONSTRATION   ANSWERED BY [place]   RELATIONSHIP SELF       Abuse Screening:  Abuse Screening Questionnaire 7/17/2019   Do you ever feel afraid of your partner? N   Are you in a relationship with someone who physically or mentally threatens you? N   Is it safe for you to go home? Y       Fall Risk  No flowsheet data found. Coordination of Care:  1. Have you been to the ER, urgent care clinic since your last visit? Hospitalized since your last visit? no    2. Have you seen or consulted any other health care providers outside of the [address] [place] [address] since your last visit? Include any pap smears or colon screening.  no

## 2019-07-18 NOTE — PROGRESS NOTES
61 y.o. BLACK OR  male who presents for f/u    He was dx'ed with prostate ca as below after being noted to have elev psa. He went to Dr Maty Morales for opinion who also recommended surgery. He is now following up with Dr Ann Castro and they are going over future plans at the next encounter    Denies any cardiovascular complaints. Not much exercise    Denied any gi complaints    Doing better with the diet and he's cut out one meal a day. Sometimes two. Weight is down as below. He is on [place] as his a1c was climbing as of the last visit and no issues so far    The perioral paresthesias are back and he wants opinion from specialist.  Also having what sounds like globus along with tonsilliths.     IF 4/18    Past Medical History:   Diagnosis Date    Allergic rhinitis     BPH (benign prostatic hyperplasia)     Cardiac arrhythmia     Deviated septum and epistaxis Dr. Arash Clifton 2009     Diabetes mellitus ([address]     on basis of elev a1c 12/15    ED (erectile dysfunction)     Elevated PSA     Fatty liver     on US 6/10; Fib-4 was 0.91 from 5/12    FHx: prostate cancer     2 bros    H. pylori infection     EGD w dilation Dr Lacie Enriquez 3/15    HSV (herpes simplex virus) infection     Hyperlipidemia     Hypertension     Obesity     peak 292 lbs, bmi 37.8 from 2/14; IF 4/18 start weight 280 lbs    Prediabetes 3/10    2 hr GTT nl     Prolactinoma (HCC)     Dr. Vangie Bateman Prostate cancer [facility]) 02/2019    Dr Ryan Bhakta bx 6/12+, psa 4.4, Chand[place] 3+3; 2nd opinion Dr Maty Morales; now seeing Dr Keena Wilson Sickle cell anemia [facility])      Past Surgical History:   Procedure Laterality Date    HX COLONOSCOPY      Dr. Samir Goldman 6/12 negative    HX CYSTECTOMY      HX GI  2/01    negative barium enema    HX ORTHOPAEDIC  11/2015    medial meniscus tear left knee Dr Meme Mckeon  03/2018    CT head neg     Social History     Socioeconomic History    Marital status:      Spouse name: Not on file    Number of children: 2    Years of education: Not on file    Highest education level: Not on file   Occupational History    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   Social Needs    Financial resource strain: Not on file    Food insecurity:     Worry: Not on file     Inability: Not on file    Transportation needs:     Medical: Not on file     Non-medical: Not on file   Tobacco Use    Smoking status: Never Smoker    Smokeless tobacco: Never Used   Substance and Sexual Activity    Alcohol use: No    Drug use: No    Sexual activity: Yes   Lifestyle    Physical activity:     Days per week: Not on file     Minutes per session: Not on file    Stress: Not on file   Relationships    Social connections:     Talks on phone: Not on file     Gets together: Not on file     Attends Hinduism service: Not on file     Active member of club or organization: Not on file     Attends meetings of clubs or organizations: Not on file     Relationship status: Not on file    Intimate partner violence:     Fear of current or ex partner: Not on file     Emotionally abused: Not on file     Physically abused: Not on file     Forced sexual activity: Not on file   Other Topics Concern    Not on file   Social History Narrative    Not on file     Allergies   Allergen Reactions    Hydrochlorothiazide Other (comments)     Weight loss, pt unsure of allergy      Metformin Other (comments)     paresthesia     Current Outpatient Medications   Medication Sig    SITagliptin (JANUVIA) 100 mg tablet Take 1 Tab by mouth daily.  atorvastatin (LIPITOR) 80 mg tablet Take 1 Tab by mouth daily.  amLODIPine-benazepril (LOTREL) 5-20 mg per capsule take 1 capsule by mouth once daily    valACYclovir (VALTREX) 1 gram tablet Take 1 Tab by mouth daily.  tadalafil (CIALIS) 20 mg tablet Take 1 Tab by mouth as needed.  multivitamin (ONE A DAY) tablet Take 1 Tab by mouth daily.     OTHER Vitamin A supplement    glucose blood VI test strips (Vanessa Ascencio) strip Patient checks blood sugar daily, Dx E11.9    glucose blood VI test strips (ONETOUCH ULTRA TEST) strip Patient to test Blood sugar 1 a day DX: E11.65     No current facility-administered medications for this visit. REVIEW OF SYSTEMS: colo 6/12 Dr Ignacio Wilkinson  Ophtho - no vision change or eye pain  Oral - no mouth pain, tongue or tooth problems  Ears - no hearing loss, ear pain, fullness, no swallowing problems  Cardiac - no CP, PND, orthopnea, edema, palpitations or syncope  Chest - no breast masses  Resp - no wheezing, chronic coughing, dyspnea  GI - no heartburn, nausea, vomiting, change in bowel habits, bleeding, hemorrhoids  Urinary - no dysuria, hematuria, flank pain, urgency, frequency  Constitutional - no wt loss, night sweats, unexplained fevers  Neuro - no focal weakness, numbness, paresthesias, incoordination, ataxia, involuntary movements  Endo - no polyuria, polydipsia, nocturia, hot flashes    Visit Vitals  /80   Pulse 77   Temp 98.2 °F (36.8 °C) (Oral)   Resp 14   Ht 6' 3\" (1.905 m)   Wt 267 lb (121.1 kg)   SpO2 100%   BMI 33.37 kg/m²     A&O x3  Affect is appropriate. Mood stable  No apparent distress  Anicteric, no JVD, adenopathy or thyromegaly. Tonsilliths on the left. Oral exam otherwise normal  No carotid bruits or radiated murmur  Lungs clear to auscultation, no wheezes or rales  Heart showed regular rate and rhythm. No murmur, rubs, gallops  Abdomen soft nontender, no hepatosplenomegaly or masses. Extremities without edema.   Pulses 1-2+ symmetrically    LABS  From 2/10 showed    gluc 111, cr 1.10, gfr>60, alt 51, hba1c 6.0,                   chol 278, tg 122,  hdl 56, ldl-c 198, wbc 3.2, hb 13.8, plt 230,    psa 0.60  From 3/10 showed                                 2hr GTT 88  From 6/10 showed    gluc 105, cr 1.30, gfr>60, alt 59,                              wbc 3.6, hb 13.9, plt 237  From 10/11 showed  gluc 102, cr 1.18, gfr 82,  alt 22, hba1c 6.5, wbc 3.5, hb 13.5, plt 255,               psa 1.20,  prl 30.8  From 11/11 showed                ldl-p 1245,                        2 hr GTT 89  From 2/12 showed                                ua neg  From 5/12 showed    gluc 109, cr 1.10, gfr>60, alt 17, hba1c 6.1,                   chol 224, tg 70,   hdl 70, ldl-c 140, wbc 3.8, hb 14.3, plt 250,    psa 1.08  From 10/12 showed  gluc 98,   cr 1.00, gfr>60, alt 23, hba1c 6.2, ldl-p 1647, chol 216, tg 64,   hdl 63, ldl-c 148  From 1/14 showed    gluc 99,    cr 1.30, gfr 58,  alt 31, hba1c 6.4,      chol 196, tg 81,   hdl 67, ldl-c 113  From 8/14 showed    gluc 100,  cr 1.20, gfr>60, alt 19, hba1c 6.2,      chol 288, tg 140, hdl 69, ldl-c 191         umar neg  From 9/14 showed                       tsh 1.84  From 2/15 showed    gluc 118, cr 1.34, gfr 68,  alt 19, hba1c 6.4,      chol 301, tg 190, hdl 63, ldl-c 200,         umar neg   From 8/15 showed         hba1c 6.3,      chol 267, tg 90,   hdl 70, ldl-c 179  From 12/15 showed  gluc 102, cr 1.29, gfr>60,     hba1c 6.8,               umar neg  From 3/16 showed    gluc 96,   cr 1.23, gfr>60,                   wbc 4.1, hb 13.3, plt 240  From 10/16 showed  gluc 110, cr 1.13, gfr>60, alt 29, hba1c 6.4,      chol 233, tg 120, hdl 73, ldl-c 136  From 9/17 showed    gluc 99,   cr 1.25, gfr>60, alt 29, hba1c 6.5,      chol 240, tg 93,   hdl 79, ldl-c 142, wbc 3.9, hb 14.4, plt 217, umar neg,    psa 3.80  From 3/18 showed    gluc 103, cr 1.30, gfr>60, alt 27, hba1c 6.3,      chol 251, tg 96,   hdl 88, ldl-c 144, tsh 2.69,                psa 3.70, b12 559, fol 14.1, free t4 1.20, hiv neg, rpr neg, hep b/c neg  From 1/19 showed            chol 156, tg 47,   hdl 99, ldl-c 48,         umar neg  From 2/19 showed               hba1c 7.0,               hep b/c neg, hiv neg, tpal neg    Patient Active Problem List   Diagnosis Code    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 Evans Tiwari D35.2    Hyperlipidemia E78.5    Essential hypertension I10    Diabetes mellitus type 2, uncontrolled ([facility] E11.65    Erectile dysfunction N52.9    Severe obesity (BMI 35.0-39. 9) with comorbidity ([facility] E66.01     Assessment and plan:  1. Ortho. F/U Dr Leanne Murillo  2. Obesity. Lifestyle and dietary measures, previously declined appetite supp. Trying to stick with IF  3. DM. Continue januvia and check labs at his convenience  4. Erectile dysfunction. Prn cialis  5. Prolactinoma. Continue dostinex  6. HLP. Continue current regimen. 7.  HTN. Continue current regimen. 8.  Prostate ca. Per Dr Eusebia Siddiqui  9. Perioral paresthesia. Globus, tonsilliths      RTC 11/19    Above conditions discussed at length and patient vocalized understanding.   All questions answered to patient satisfaction

## 2019-07-20 ENCOUNTER — HOSPITAL ENCOUNTER (OUTPATIENT)
Dept: LAB | Age: 59
Discharge: HOME | End: 2019-07-20
Payer: COMMERCIAL

## 2019-07-20 DIAGNOSIS — E11.65 UNCONTROLLED TYPE 2 DIABETES MELLITUS WITH HYPERGLYCEMIA (HCC): ICD-10-CM

## 2019-07-20 LAB
ALBUMIN SERPL-MCNC: 4.2 G/DL (ref 3.4–5)
ALBUMIN/GLOB SERPL: 1.3 {RATIO} (ref 0.8–1.7)
ALP SERPL-CCNC: 54 U/L (ref 45–117)
ALT SERPL-CCNC: 28 U/L (ref 16–61)
ANION GAP SERPL CALC-SCNC: 7 MMOL/L (ref 3–18)
AST SERPL-CCNC: 21 U/L (ref 10–38)
BILIRUB SERPL-MCNC: 0.9 MG/DL (ref 0.2–1)
BUN SERPL-MCNC: 18 MG/DL (ref 7–18)
BUN/CREAT SERPL: 13 (ref 12–20)
CALCIUM SERPL-MCNC: 9.5 MG/DL (ref 8.5–10.1)
CHLORIDE SERPL-SCNC: 107 MMOL/L (ref 100–111)
CO2 SERPL-SCNC: 29 MMOL/L (ref 21–32)
CREAT SERPL-MCNC: 1.38 MG/DL (ref 0.6–1.3)
GLOBULIN SER CALC-MCNC: 3.2 G/DL (ref 2–4)
GLUCOSE SERPL-MCNC: 103 MG/DL (ref 74–99)
HBA1C MFR BLD: 6.6 % (ref 4.2–5.6)
POTASSIUM SERPL-SCNC: 4.3 MMOL/L (ref 3.5–5.5)
PROT SERPL-MCNC: 7.4 G/DL (ref 6.4–8.2)
SODIUM SERPL-SCNC: 143 MMOL/L (ref 136–145)

## 2019-07-20 PROCEDURE — 83036 HEMOGLOBIN GLYCOSYLATED A1C: CPT

## 2019-07-20 PROCEDURE — 36415 COLL VENOUS BLD VENIPUNCTURE: CPT

## 2019-07-20 PROCEDURE — 80053 COMPREHEN METABOLIC PANEL: CPT

## 2019-07-21 ENCOUNTER — TELEPHONE (OUTPATIENT)
Dept: INTERNAL MEDICINE CLINIC | Age: 59
End: 2019-07-21

## 2019-07-21 NOTE — TELEPHONE ENCOUNTER
pls call    Labs ok  a1c better w the wt loss    Results for orders placed or performed during the hospital encounter of 07/20/19   HEMOGLOBIN A1C W/O EAG   Result Value Ref Range    Hemoglobin A1c 6.6 (H) 4.2 - 5.6 %   METABOLIC PANEL, COMPREHENSIVE   Result Value Ref Range    Sodium 143 136 - 145 mmol/L    Potassium 4.3 3.5 - 5.5 mmol/L    Chloride 107 100 - 111 mmol/L    CO2 29 21 - 32 mmol/L    Anion gap 7 3.0 - 18 mmol/L    Glucose 103 (H) 74 - 99 mg/dL    BUN 18 7.0 - 18 MG/DL    Creatinine 1.38 (H) 0.6 - 1.3 MG/DL    BUN/Creatinine ratio 13 12 - 20      GFR est AA >60 >60 ml/min/1.73m2    GFR est non-AA 53 (L) >60 ml/min/1.73m2    Calcium 9.5 8.5 - 10.1 MG/DL    Bilirubin, total 0.9 0.2 - 1.0 MG/DL    ALT (SGPT) 28 16 - 61 U/L    AST (SGOT) 21 10 - 38 U/L    Alk.  phosphatase 54 45 - 117 U/L    Protein, total 7.4 6.4 - 8.2 g/dL    Albumin 4.2 3.4 - 5.0 g/dL    Globulin 3.2 2.0 - 4.0 g/dL    A-G Ratio 1.3 0.8 - 1.7

## 2019-08-20 PROBLEM — R35.1 NOCTURIA: Status: ACTIVE | Noted: 2019-08-20

## 2019-08-26 ENCOUNTER — HOSPITAL ENCOUNTER (OUTPATIENT)
Dept: CT IMAGING | Age: 59
Discharge: HOME | End: 2019-08-26
Attending: PHYSICIAN ASSISTANT
Payer: COMMERCIAL

## 2019-08-26 DIAGNOSIS — J35.01 CHRONIC TONSILLITIS: ICD-10-CM

## 2019-08-26 LAB — CREAT UR-MCNC: 0.8 MG/DL (ref 0.6–1.3)

## 2019-08-26 PROCEDURE — 74011636320 HC RX REV CODE- 636/320

## 2019-08-26 PROCEDURE — 82565 ASSAY OF CREATININE: CPT

## 2019-08-26 PROCEDURE — 70492 CT SFT TSUE NCK W/O & W/DYE: CPT

## 2019-08-26 RX ADMIN — IOPAMIDOL 80 ML: 612 INJECTION, SOLUTION INTRAVENOUS at 18:41

## 2019-11-02 DIAGNOSIS — B00.9 HSV INFECTION: ICD-10-CM

## 2019-11-04 RX ORDER — VALACYCLOVIR HYDROCHLORIDE 1 G/1
TABLET, FILM COATED ORAL
Qty: 90 TAB | Refills: 3 | Status: SHIPPED | OUTPATIENT
Start: 2019-11-04 | End: 2020-11-06

## 2019-11-30 DIAGNOSIS — E78.5 HYPERLIPIDEMIA, UNSPECIFIED HYPERLIPIDEMIA TYPE: ICD-10-CM

## 2019-12-02 RX ORDER — EZETIMIBE 10 MG/1
TABLET ORAL
Qty: 30 TAB | Refills: 11 | Status: SHIPPED | OUTPATIENT
Start: 2019-12-02

## 2020-01-13 RX ORDER — AMLODIPINE AND BENAZEPRIL HYDROCHLORIDE 5; 20 MG/1; MG/1
CAPSULE ORAL
Qty: 90 CAP | Refills: 3 | Status: SHIPPED | OUTPATIENT
Start: 2020-01-13 | End: 2020-08-07 | Stop reason: ALTCHOICE

## 2020-02-17 ENCOUNTER — OFFICE VISIT (OUTPATIENT)
Dept: INTERNAL MEDICINE CLINIC | Age: 60
End: 2020-02-17

## 2020-02-17 VITALS
OXYGEN SATURATION: 98 % | WEIGHT: 271 LBS | DIASTOLIC BLOOD PRESSURE: 82 MMHG | HEART RATE: 89 BPM | RESPIRATION RATE: 14 BRPM | BODY MASS INDEX: 33.69 KG/M2 | SYSTOLIC BLOOD PRESSURE: 120 MMHG | HEIGHT: 75 IN | TEMPERATURE: 98 F

## 2020-02-17 DIAGNOSIS — D35.2 PROLACTINOMA (HCC): ICD-10-CM

## 2020-02-17 DIAGNOSIS — E66.01 SEVERE OBESITY (BMI 35.0-39.9) WITH COMORBIDITY (HCC): ICD-10-CM

## 2020-02-17 DIAGNOSIS — E11.65 UNCONTROLLED TYPE 2 DIABETES MELLITUS WITH HYPERGLYCEMIA (HCC): ICD-10-CM

## 2020-02-17 DIAGNOSIS — G47.33 OSA (OBSTRUCTIVE SLEEP APNEA): Primary | ICD-10-CM

## 2020-02-17 DIAGNOSIS — N52.9 ERECTILE DYSFUNCTION, UNSPECIFIED ERECTILE DYSFUNCTION TYPE: ICD-10-CM

## 2020-02-17 DIAGNOSIS — I10 ESSENTIAL HYPERTENSION: ICD-10-CM

## 2020-02-17 DIAGNOSIS — E78.5 HYPERLIPIDEMIA, UNSPECIFIED HYPERLIPIDEMIA TYPE: ICD-10-CM

## 2020-02-17 RX ORDER — TADALAFIL 20 MG/1
20 TABLET ORAL AS NEEDED
Qty: 30 TAB | Refills: 11 | Status: SHIPPED | OUTPATIENT
Start: 2020-02-17 | End: 2021-02-12 | Stop reason: SDUPTHER

## 2020-02-17 NOTE — PROGRESS NOTES
61 y.o. BLACK OR  male who presents for f/u    He was dx'ed with prostate ca as below after being noted to have elev psa. He is being followed by Dr Mauro Nicely and has been offered DVP vs XRT but he wanted to hold off and f/u in summertime    Denies any cardiovascular complaints. Not much exercise    Denied any gi complaints    Doing better with the diet and he's down to 1-2 meals a day although not much weight loss as below.   Not checking his sugars much    Vitals 2/17/2020 2/4/2020 11/11/2019 7/17/2019 7/17/2019   Weight 271 lb 271 lb 270 lb 267 lb 267 lb     His gf noted snoring and apnea spells so interested in going for LORIE evaluation    IF 4/18    Past Medical History:   Diagnosis Date    Allergic rhinitis     BPH (benign prostatic hyperplasia)     Cardiac arrhythmia     Deviated septum and epistaxis Dr. Jadiel Arevalo 2009     Diabetes mellitus ([facility] [address]     on basis of elev a1c 12/15    ED (erectile dysfunction)     Elevated PSA     Fatty liver     on US 6/10; Fib-4 was 0.91 from 5/12    FHx: prostate cancer     2 bros    H. pylori infection     EGD w dilation Dr Nay Lyon 3/15    HSV (herpes simplex virus) infection     Hyperlipidemia     Hypertension     Obesity     peak 292 lbs, bmi 37.8 from 2/14; IF 4/18 start weight 280 lbs    Prediabetes 3/10    2 hr GTT nl     Prolactinoma (HCC)     Dr. Ray Lagunas Prostate cancer [facility]) 02/2019    Dr Steven Kirkpatrick bx 6/12+, psa 4.4, Chandu 3+3; 2nd opinion Dr Senia Browne; now seeing Dr Shannon Colmenares Sickle cell anemia [facility])      Past Surgical History:   Procedure Laterality Date    HX COLONOSCOPY      Dr. Julianna Lamas 6/12 negative    HX CYSTECTOMY      HX GI  2/01    negative barium enema    HX ORTHOPAEDIC  11/2015    medial meniscus tear left knee Dr Patrice Arnold  03/2018    CT head neg     Social History     Socioeconomic History    Marital status:      Spouse name: Not on file    Number of children: 2    Years of education: Not on file    Highest education level: Not on file   Occupational History    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   Social Needs    Financial resource strain: Not on file    Food insecurity:     Worry: Not on file     Inability: Not on file    Transportation needs:     Medical: Not on file     Non-medical: Not on file   Tobacco Use    Smoking status: Never Smoker    Smokeless tobacco: Never Used   Substance and Sexual Activity    Alcohol use: No    Drug use: No    Sexual activity: Yes   Lifestyle    Physical activity:     Days per week: Not on file     Minutes per session: Not on file    Stress: Not on file   Relationships    Social connections:     Talks on phone: Not on file     Gets together: Not on file     Attends Sikhism service: Not on file     Active member of club or organization: Not on file     Attends meetings of clubs or organizations: Not on file     Relationship status: Not on file    Intimate partner violence:     Fear of current or ex partner: Not on file     Emotionally abused: Not on file     Physically abused: Not on file     Forced sexual activity: Not on file   Other Topics Concern    Not on file   Social History Narrative    Not on file     Allergies   Allergen Reactions    Hydrochlorothiazide Other (comments)     Weight loss, pt unsure of allergy      Metformin Other (comments)     paresthesia     Current Outpatient Medications   Medication Sig    SITagliptin (JANUVIA) 100 mg tablet Take 1 Tab by mouth daily.  tadalafil (CIALIS) 20 mg tablet Take 1 Tab by mouth as needed (Daily as needed). Indications: the inability to have an erection    amLODIPine-benazepril (LOTREL) 5-20 mg per capsule take 1 capsule by mouth once daily    ezetimibe (ZETIA) 10 mg tablet take 1 tablet by mouth once daily    valACYclovir (VALTREX) 1 gram tablet take 1 tablet by mouth once daily    atorvastatin (LIPITOR) 80 mg tablet Take 1 Tab by mouth daily.     multivitamin (ONE A DAY) tablet Take 1 Tab by mouth daily.  OTHER Vitamin A supplement    glucose blood VI test strips (ONETOUCH VERIO) strip Patient checks blood sugar daily, Dx E11.9    glucose blood VI test strips (ONETOUCH ULTRA TEST) strip Patient to test Blood sugar 1 a day DX: E11.65     No current facility-administered medications for this visit. REVIEW OF SYSTEMS: colo 6/12 Dr Simi Forbes  Ophtho - no vision change or eye pain  Oral - no mouth pain, tongue or tooth problems  Ears - no hearing loss, ear pain, fullness, no swallowing problems  Cardiac - no CP, PND, orthopnea, edema, palpitations or syncope  Chest - no breast masses  Resp - no wheezing, chronic coughing, dyspnea  GI - no heartburn, nausea, vomiting, change in bowel habits, bleeding, hemorrhoids  Urinary - no dysuria, hematuria, flank pain, urgency, frequency    Visit Vitals  /82   Pulse 89   Temp 98 °F (36.7 °C) (Oral)   Resp 14   Ht 6' 3\" (1.905 m)   Wt 271 lb (122.9 kg)   SpO2 98%   BMI 33.87 kg/m²     A&O x3  Affect is appropriate. Mood stable  No apparent distress  Anicteric, no JVD, adenopathy or thyromegaly. Tonsilliths on the left. Oral exam otherwise normal  No carotid bruits or radiated murmur  Lungs clear to auscultation, no wheezes or rales  Heart showed regular rate and rhythm. No murmur, rubs, gallops  Abdomen soft nontender, no hepatosplenomegaly or masses. Extremities without edema.   Pulses 1-2+ symmetrically    LABS  From 2/10 showed    gluc 111, cr 1.10, gfr>60, alt 51, hba1c 6.0,                   chol 278, tg 122,  hdl 56, ldl-c 198, wbc 3.2, hb 13.8, plt 230, psa 0.60  From 3/10 showed                                         2hr GTT 88  From 6/10 showed    gluc 105, cr 1.30, gfr>60, alt 59,                              wbc 3.6, hb 13.9, plt 237  From 10/11 showed  gluc 102, cr 1.18, gfr 82,  alt 22, hba1c 6.5,                             wbc 3.5, hb 13.5, plt 255, psa 1.20, prl 30.8  From 11/11 showed                ldl-p 1245, 2 hr GTT 89  From 2/12 showed                                   ua neg  From 5/12 showed    gluc 109, cr 1.10, gfr>60, alt 17, hba1c 6.1,                   chol 224, tg 70,   hdl 70, ldl-c 140, wbc 3.8, hb 14.3, plt 250, psa 1.08  From 10/12 showed  gluc 98,   cr 1.00, gfr>60, alt 23, hba1c 6.2, ldl-p 1647, chol 216, tg 64,   hdl 63, ldl-c 148  From 1/14 showed    gluc 99,   cr 1.30, gfr 58,  alt 31, hba1c 6.4,      chol 196, tg 81,   hdl 67, ldl-c 113  From 8/14 showed    gluc 100, cr 1.20, gfr>60, alt 19, hba1c 6.2, umar neg  chol 288, tg 140, hdl 69, ldl-c 191         From 9/14 showed                       tsh 1.84  From 2/15 showed    gluc 118, cr 1.34, gfr 68,  alt 19, hba1c 6.4, umar neg  chol 301, tg 190, hdl 63, ldl-c 200,     From 8/15 showed         hba1c 6.3,      chol 267, tg 90,   hdl 70, ldl-c 179  From 12/15 showed  gluc 102, cr 1.29, gfr>60,     hba1c 6.8, umar neg  From 3/16 showed    gluc 96,   cr 1.23, gfr>60,                   wbc 4.1, hb 13.3, plt 240  From 10/16 showed  gluc 110, cr 1.13, gfr>60, alt 29, hba1c 6.4,      chol 233, tg 120, hdl 73, ldl-c 136  From 9/17 showed    gluc 99,   cr 1.25, gfr>60, alt 29, hba1c 6.5, umar neg, chol 240, tg 93,   hdl 79, ldl-c 142, wbc 3.9, hb 14.4, plt 217, psa 3.80  From 3/18 showed    gluc 103, cr 1.30, gfr>60, alt 27, hba1c 6.3,      chol 251, tg 96,   hdl 88, ldl-c 144, tsh 2.69,            psa 3.70, b12 559, fol 14.1, free t4 1.20, hiv neg, rpr neg, hep b/c neg  From 1/19 showed                           umar neg   chol 156, tg 47,   hdl 99, ldl-c 48,           From 2/19 showed                   hba1c 7.0,                        hep b/c neg, hiv neg, tpal neg    Patient Active Problem List   Diagnosis Code    Prolactin microadenoma Dr. Jacoby Mckeon D35.2    Hyperlipidemia E78.5    Essential hypertension I10    Diabetes mellitus type 2, uncontrolled ([facility] [address] E11.65    Erectile dysfunction N52.9    Severe obesity (BMI 35.0-39. 9) with comorbidity ([facility] [address] E66.01    Nocturia R35.1     Assessment and plan:  1. Ortho. F/U Dr Nima Tyler  2. Obesity. Lifestyle and dietary measures, previously declined appetite supp. Trying to stick with IF  3. DM. Continue januvia and check labs at his convenience  4. Erectile dysfunction. Prn cialis  5. Prolactinoma. Continue dostinex  6. HLP. Continue current regimen. 7.  HTN. Continue current regimen. 8.  Prostate ca. Per Dr Kody Coto  9. R/o LORIE. Will send to Dr Aiyana Blanco group      RTC 8/20    Above conditions discussed at length and patient vocalized understanding.   All questions answered to patient satisfaction

## 2020-02-17 NOTE — PROGRESS NOTES
Rodolfo Esposito presents today for   Chief Complaint   Patient presents with    Cholesterol Problem     follow up               Depression Screening:  3 most recent PHQ Screens 7/17/2019   Little interest or pleasure in doing things Not at all   Feeling down, depressed, irritable, or hopeless Not at all   Total Score PHQ 2 0       Learning Assessment:  Learning Assessment 7/17/2019   PRIMARY LEARNER Patient   HIGHEST LEVEL OF EDUCATION - PRIMARY LEARNER  GRADUATED HIGH SCHOOL OR GED   BARRIERS PRIMARY LEARNER NONE   CO-LEARNER CAREGIVER No   PRIMARY LANGUAGE ENGLISH   LEARNER PREFERENCE PRIMARY DEMONSTRATION     READING     LISTENING     PICTURES     VIDEOS     DEMONSTRATION   ANSWERED BY Pasha Martino   RELATIONSHIP SELF       Abuse Screening:  Abuse Screening Questionnaire 7/17/2019   Do you ever feel afraid of your partner? N   Are you in a relationship with someone who physically or mentally threatens you? N   Is it safe for you to go home? Y       Fall Risk  No flowsheet data found. Health Maintenance Due   Topic Date Due    Foot Exam Q1  02/01/1970    Shingrix Vaccine Age 50> (1 of 2) 02/01/2010    Influenza Age 5 to Adult  08/01/2019    MICROALBUMIN Q1  01/19/2020    Lipid Screen  01/19/2020       Coordination of Care:  1. Have you been to the ER, urgent care clinic since your last visit? Hospitalized since your last visit? no    2. Have you seen or consulted any other health care providers outside of the [address] [place] [address] since your last visit? Include any pap smears or colon screening.  no

## 2020-02-26 ENCOUNTER — HOSPITAL ENCOUNTER (OUTPATIENT)
Dept: LAB | Age: 60
Discharge: HOME | End: 2020-02-26
Payer: COMMERCIAL

## 2020-02-26 DIAGNOSIS — E11.65 UNCONTROLLED TYPE 2 DIABETES MELLITUS WITH HYPERGLYCEMIA (HCC): ICD-10-CM

## 2020-02-26 LAB
ALBUMIN SERPL-MCNC: 4 G/DL (ref 3.4–5)
ALBUMIN/GLOB SERPL: 1.2 {RATIO} (ref 0.8–1.7)
ALP SERPL-CCNC: 46 U/L (ref 45–117)
ALT SERPL-CCNC: 30 U/L (ref 16–61)
ANION GAP SERPL CALC-SCNC: 4 MMOL/L (ref 3–18)
AST SERPL-CCNC: 19 U/L (ref 10–38)
BILIRUB SERPL-MCNC: 0.6 MG/DL (ref 0.2–1)
BUN SERPL-MCNC: 14 MG/DL (ref 7–18)
BUN/CREAT SERPL: 11 (ref 12–20)
CALCIUM SERPL-MCNC: 9.2 MG/DL (ref 8.5–10.1)
CHLORIDE SERPL-SCNC: 105 MMOL/L (ref 100–111)
CHOLEST SERPL-MCNC: 160 MG/DL
CO2 SERPL-SCNC: 29 MMOL/L (ref 21–32)
CREAT SERPL-MCNC: 1.22 MG/DL (ref 0.6–1.3)
CREAT UR-MCNC: 216 MG/DL (ref 30–125)
ERYTHROCYTE [DISTWIDTH] IN BLOOD BY AUTOMATED COUNT: 13.6 % (ref 11.6–14.5)
GLOBULIN SER CALC-MCNC: 3.4 G/DL (ref 2–4)
GLUCOSE SERPL-MCNC: 92 MG/DL (ref 74–99)
HBA1C MFR BLD: 6.3 % (ref 4.2–5.6)
HCT VFR BLD AUTO: 39.6 % (ref 36–48)
HDLC SERPL-MCNC: 91 MG/DL (ref 40–60)
HDLC SERPL: 1.8 {RATIO} (ref 0–5)
HGB BLD-MCNC: 13.7 G/DL (ref 13–16)
LDLC SERPL CALC-MCNC: 60.6 MG/DL (ref 0–100)
LIPID PROFILE,FLP: ABNORMAL
MCH RBC QN AUTO: 28.4 PG (ref 24–34)
MCHC RBC AUTO-ENTMCNC: 34.6 G/DL (ref 31–37)
MCV RBC AUTO: 82.2 FL (ref 74–97)
MICROALBUMIN UR-MCNC: 0.78 MG/DL (ref 0–3)
MICROALBUMIN/CREAT UR-RTO: 4 MG/G (ref 0–30)
PLATELET # BLD AUTO: 217 K/UL (ref 135–420)
PMV BLD AUTO: 10.5 FL (ref 9.2–11.8)
POTASSIUM SERPL-SCNC: 4.3 MMOL/L (ref 3.5–5.5)
PROT SERPL-MCNC: 7.4 G/DL (ref 6.4–8.2)
RBC # BLD AUTO: 4.82 M/UL (ref 4.7–5.5)
SODIUM SERPL-SCNC: 138 MMOL/L (ref 136–145)
TRIGL SERPL-MCNC: 42 MG/DL (ref ?–150)
VLDLC SERPL CALC-MCNC: 8.4 MG/DL
WBC # BLD AUTO: 3.8 K/UL (ref 4.6–13.2)

## 2020-02-26 PROCEDURE — 80053 COMPREHEN METABOLIC PANEL: CPT

## 2020-02-26 PROCEDURE — 82043 UR ALBUMIN QUANTITATIVE: CPT

## 2020-02-26 PROCEDURE — 83036 HEMOGLOBIN GLYCOSYLATED A1C: CPT

## 2020-02-26 PROCEDURE — 80061 LIPID PANEL: CPT

## 2020-02-26 PROCEDURE — 36415 COLL VENOUS BLD VENIPUNCTURE: CPT

## 2020-02-26 PROCEDURE — 85027 COMPLETE CBC AUTOMATED: CPT

## 2020-03-04 ENCOUNTER — TELEPHONE (OUTPATIENT)
Dept: INTERNAL MEDICINE CLINIC | Age: 60
End: 2020-03-04

## 2020-03-05 NOTE — TELEPHONE ENCOUNTER
pls call      Results for orders placed or performed during the hospital encounter of 02/26/20   CBC W/O DIFF   Result Value Ref Range    WBC 3.8 (L) 4.6 - 13.2 K/uL    RBC 4.82 4.70 - 5.50 M/uL    HGB 13.7 13.0 - 16.0 g/dL    HCT 39.6 36.0 - 48.0 %    MCV 82.2 74.0 - 97.0 FL    MCH 28.4 24.0 - 34.0 PG    MCHC 34.6 31.0 - 37.0 g/dL    RDW 13.6 11.6 - 14.5 %    PLATELET 616 070 - 372 K/uL    MPV 10.5 9.2 - 11.8 FL   LIPID PANEL   Result Value Ref Range    LIPID PROFILE          Cholesterol, total 160 <200 MG/DL    Triglyceride 42 <150 MG/DL    HDL Cholesterol 91 (H) 40 - 60 MG/DL    LDL, calculated 60.6 0 - 100 MG/DL    VLDL, calculated 8.4 MG/DL    CHOL/HDL Ratio 1.8 0 - 5.0     METABOLIC PANEL, COMPREHENSIVE   Result Value Ref Range    Sodium 138 136 - 145 mmol/L    Potassium 4.3 3.5 - 5.5 mmol/L    Chloride 105 100 - 111 mmol/L    CO2 29 21 - 32 mmol/L    Anion gap 4 3.0 - 18 mmol/L    Glucose 92 74 - 99 mg/dL    BUN 14 7.0 - 18 MG/DL    Creatinine 1.22 0.6 - 1.3 MG/DL    BUN/Creatinine ratio 11 (L) 12 - 20      GFR est AA >60 >60 ml/min/1.73m2    GFR est non-AA >60 >60 ml/min/1.73m2    Calcium 9.2 8.5 - 10.1 MG/DL    Bilirubin, total 0.6 0.2 - 1.0 MG/DL    ALT (SGPT) 30 16 - 61 U/L    AST (SGOT) 19 10 - 38 U/L    Alk.  phosphatase 46 45 - 117 U/L    Protein, total 7.4 6.4 - 8.2 g/dL    Albumin 4.0 3.4 - 5.0 g/dL    Globulin 3.4 2.0 - 4.0 g/dL    A-G Ratio 1.2 0.8 - 1.7     MICROALBUMIN, UR, RAND W/ MICROALB/CREAT RATIO   Result Value Ref Range    Microalbumin,urine random 0.78 0 - 3.0 MG/DL    Creatinine, urine 216.00 (H) 30 - 125 mg/dL    Microalbumin/Creat ratio (mg/g creat) 4 0 - 30 mg/g   HEMOGLOBIN A1C W/O EAG   Result Value Ref Range    Hemoglobin A1c 6.3 (H) 4.2 - 5.6 %     Cbc, cmp, lipid, a1c, umar in great range

## 2020-03-13 ENCOUNTER — HOSPITAL ENCOUNTER (OUTPATIENT)
Dept: LAB | Age: 60
Discharge: HOME | End: 2020-03-13
Payer: COMMERCIAL

## 2020-03-13 LAB
ANION GAP SERPL CALC-SCNC: 4 MMOL/L (ref 3–18)
BUN SERPL-MCNC: 18 MG/DL (ref 7–18)
BUN/CREAT SERPL: 14 (ref 12–20)
CALCIUM SERPL-MCNC: 9.1 MG/DL (ref 8.5–10.1)
CHLORIDE SERPL-SCNC: 107 MMOL/L (ref 100–111)
CO2 SERPL-SCNC: 30 MMOL/L (ref 21–32)
CREAT SERPL-MCNC: 1.3 MG/DL (ref 0.6–1.3)
FERRITIN SERPL-MCNC: 283 NG/ML (ref 8–388)
FOLATE SERPL-MCNC: 11.9 NG/ML (ref 3.1–17.5)
GLUCOSE SERPL-MCNC: 90 MG/DL (ref 74–99)
POTASSIUM SERPL-SCNC: 4.1 MMOL/L (ref 3.5–5.5)
SODIUM SERPL-SCNC: 141 MMOL/L (ref 136–145)
VIT B12 SERPL-MCNC: 477 PG/ML (ref 211–911)

## 2020-03-13 PROCEDURE — 82728 ASSAY OF FERRITIN: CPT

## 2020-03-13 PROCEDURE — 36415 COLL VENOUS BLD VENIPUNCTURE: CPT

## 2020-03-13 PROCEDURE — 80048 BASIC METABOLIC PNL TOTAL CA: CPT

## 2020-03-13 PROCEDURE — 82607 VITAMIN B-12: CPT

## 2020-03-19 ENCOUNTER — TELEPHONE (OUTPATIENT)
Dept: INTERNAL MEDICINE CLINIC | Age: 60
End: 2020-03-19

## 2020-03-19 PROBLEM — C61 PROSTATE CANCER (HCC): Status: ACTIVE | Noted: 2020-03-19

## 2020-03-19 NOTE — TELEPHONE ENCOUNTER
Pt works at [employer] and said he needs a letter just stating that he has following health condtions:  Prostate cancer, diabetes and high blood pressure. Said they have to have it on file. Please call him at [phone] to  letter.

## 2020-04-16 ENCOUNTER — TELEPHONE (OUTPATIENT)
Dept: INTERNAL MEDICINE CLINIC | Age: 60
End: 2020-04-16

## 2020-04-16 ENCOUNTER — OFFICE VISIT (OUTPATIENT)
Dept: FAMILY MEDICINE CLINIC | Facility: CLINIC | Age: 60
End: 2020-04-16

## 2020-04-16 VITALS
RESPIRATION RATE: 20 BRPM | HEART RATE: 84 BPM | OXYGEN SATURATION: 99 % | DIASTOLIC BLOOD PRESSURE: 98 MMHG | SYSTOLIC BLOOD PRESSURE: 142 MMHG | TEMPERATURE: 97 F

## 2020-04-16 DIAGNOSIS — Z71.89 EDUCATED ABOUT COVID-19 VIRUS INFECTION: ICD-10-CM

## 2020-04-16 DIAGNOSIS — J02.9 SORE THROAT: ICD-10-CM

## 2020-04-16 DIAGNOSIS — J30.89 ENVIRONMENTAL AND SEASONAL ALLERGIES: Primary | ICD-10-CM

## 2020-04-16 DIAGNOSIS — R03.0 ELEVATED BLOOD PRESSURE READING: ICD-10-CM

## 2020-04-16 LAB
FLUAV+FLUBV AG NOSE QL IA.RAPID: NEGATIVE POS/NEG
FLUAV+FLUBV AG NOSE QL IA.RAPID: NEGATIVE POS/NEG
S PYO AG THROAT QL: NEGATIVE
VALID INTERNAL CONTROL?: YES
VALID INTERNAL CONTROL?: YES

## 2020-04-16 RX ORDER — SODIUM CHLORIDE 0.65 %
1 AEROSOL, SPRAY (ML) NASAL AS NEEDED
Qty: 30 ML | Refills: 0
Start: 2020-04-16

## 2020-04-16 RX ORDER — FLUTICASONE PROPIONATE 50 MCG
2 SPRAY, SUSPENSION (ML) NASAL
Qty: 1 BOTTLE | Refills: 0 | Status: SHIPPED | OUTPATIENT
Start: 2020-04-16

## 2020-04-16 NOTE — TELEPHONE ENCOUNTER
If he thinks he has uri, will need to send to [facility] as we are not allowed to see pts in the office who have sore throat, cough, fever etc  they can check him for strep there  This is a [facility] policy that's in place for covid

## 2020-04-16 NOTE — TELEPHONE ENCOUNTER
Patient wants to be seen in the office and not virtually. He is complaining that \"something is stuck in his throat and getting worse\".   Please advise

## 2020-04-16 NOTE — PROGRESS NOTES
Renea Desmond presents today for   Chief Complaint   Patient presents with    Sore Throat     pt c/o throat discomfort only on left side       Is someone accompanying this pt? no    Is the patient using any DME equipment during OV? no    Travel and Exposure Screening was performed during check in or rooming process Yes  Yes: Comment: hasnt traveled      Depression Screening:  3 most recent [facility] 4/16/2020   Little interest or pleasure in doing things Not at all   Feeling down, depressed, irritable, or hopeless Not at all   Total Score PHQ 2 0       Fall Risk  No flowsheet data found.     This Visit Test  Results for orders placed or performed in visit on 04/16/20   AMB POC RAPID INFLUENZA TEST   Result Value Ref Range    VALID INTERNAL CONTROL POC Yes     Influenza A Ag POC Negative Negative Pos/Neg    Influenza B Ag POC Negative Negative Pos/Neg   AMB POC RAPID STREP A   Result Value Ref Range    VALID INTERNAL CONTROL POC Yes     Group A Strep Ag Negative Negative

## 2020-04-16 NOTE — Clinical Note
Neg flu, neg strep. Did not test for covid due to lack of sx and low risk of exposure. See note for further detail. Thank you!

## 2020-04-16 NOTE — TELEPHONE ENCOUNTER
Would not be able to dx or see anything 'in the throat'  Suggest ENT consult if it's in the throat or GI if it's further down into the esophagus (previosuly saw Dr Octavio Collins)

## 2020-04-16 NOTE — PROGRESS NOTES
Chief Complaint   Patient presents with    Sore Throat     pt c/o throat discomfort only on left side       SUBJECTIVE:    Respiratory Symptoms:      The patient presents to our specialty flu / COVID-19 clinic with a focused complaint of the following: Patients only complaint today is a \"feeling of something being stuck in the back of my throat on the left. \" This is a chronic condition. Pt denies chocking on foods (solid or soft) or strangling on fluids. Pt states he has seen ENT, Jonathan Washington, in the past but is unable to see her at this time due to covid has office closed. Pt was unable to see his PCP due to office being closed to those patients with certain symptoms due to covid. Pt denies fever. Pt used to take \"a nasal spray\" prescribed to him by EENT but does not have any accessible to him at this time. The patient denies GI symptoms. The patient denies  symptoms. The patient denies chest pain, denies chest tightness, denies shortness of breath. Duration of symptoms has been many months to years   Patient denies OTC treatment:     Patient denies recent travel. Pt exposed to sick contacts under investigations for possible Covid NO. Pt works at [employer] but was furloughed on 3/18/2020 due to covid and pt being high risk due to DM, HTN, age, and hx of prostate cancer. Pt is not a current smoker. OBJECTIVE    Visit Vitals  BP (!) 142/98   Pulse 84   Temp 97 °F (36.1 °C) (Oral)   Resp 20   SpO2 99%      General:  healthy, alert, well developed, well nourished, cooperative, pleasant, in no apparent distress and appeared slightly annoyed due to not being able to see his providers face to face. Eyes:   The lids are without swelling, lesions, or drainage. The conjunctiva is clear and noninjected. ENT:  bilateral TM normal without fluid or infection, red ear canal inflamed, pharynx erythematous, airway not compromised and postnasal drip noted.  tonsils are present and normal.  Neck: normal and no adenopathy. Lungs/CV: clear to auscultation, no wheezes or rales and unlabored breathing. Heart: regular rhythm normal rate. Skin:  No rashes, no jaundice. ASSESSMENT / PLAN     ICD-10-CM ICD-9-CM    1. Environmental and seasonal allergies J30.89 477.8 fluticasone propionate (Flonase Allergy Relief) 50 mcg/actuation nasal spray      sodium chloride (Saline Nasal Mist) 0.65 % nasal squeeze bottle   2. Sore throat J02.9 462 AMB POC RAPID INFLUENZA TEST      AMB POC RAPID STREP A   3. Elevated blood pressure reading R03.0 796.2    4. Educated About Covid-19 Virus Infection Z71.89       Explained to patient the purpose of virtual visits and the policies set in place to help protect him and others from covid illness. Pt was satisfied with explanation given to him. Reviewed CT neck soft tissue report from Aug 2019 ordered by Matthew Cooper, [address] [place] [address]. Revealed Slight mucosal thickening in the left maxillary sinus with no adenopathy or mass noted. Explained this to patient as well. I suspect patient may be suffering from chronic allergies but strongly encouraged pt to follow up with EENT once their offices reopen post covid outbreak. Instructed pt on the importance of using the flonase as directed and saline spray at night. Patient verbalized understanding. Noted an elevated BP today. Pt has not taken his BP medications this am. Strongly encouraged pt on the importance of taking BP medications as directed by PCP to help reduce CV risks. Patient verbalized understanding. Patient has tested negative for influenza  Pt has tested Negative for strep. low suspicion of Covid at this time due to symptoms and possible exposure. Based on CDC recommendations and limited testing supplies, only those patients who meet criteria will be tested for Covid.      High priority groups for testing   Symptomatic and/or Exposure /Test for Covid  Immunocompromised host (on prednisone, biological therapy, blood cancer, metastatic cancer or active chemotherapy)   [facility] care worker in the home    Other high-risk group: o age >47   o Uncontrolled DM   o Uncontrolled HTN   o BMI >40, CKD/ESRD    Dialysis patients (patients going to HD units, not asymptomatic home HD/PD)    Anyone living in a congregate setting     Non High-risk patient category           Test for COVID-19   Asymptomatic, no known exposure  No    Asymptomatic, possible exposure  No    Asymptomatic, definite exposure  Provider discretion    Symptomatic, no known exposure  Yes    Symptomatic, + exposure  Yes        Based on this patients symptoms and the current pandemic situation, I have not recommended a 14 day quarantine from the onset of symptoms. COVID-19 testing was not completed due to lack of symptoms and low risk of exposure. Work note was not given for 14 day quarantine. We have provided the patient with a detailed after visit summary which was reviewed, and red flag symptoms that would warrant an ER visit were emphasized. CC'd chart to PCP: Yes: Comment: Dr Meme Nielsen and Dilma Morris, AGNP-C, DNP      This visit was provided as a focused evaluation during the COVID -19 pandemic/national emergency. A comprehensive review of all previous patient history and testing was not conducted. Pertinent findings were elicited during the visit.

## 2020-04-16 NOTE — TELEPHONE ENCOUNTER
Called and spoke with patient gave message below. Patient states he has seen ENT for this issue and they couldn't find anything wrong. He states he feels like it may be strep throat. I asked what symptoms he is having he said he feels like something is stuck in the left side of his throat and that he needs to cough it up. He reports taking his finger and touching the back of his throat to help the area feel better. He said this is driving him crazy. He says he tries to cough and cant get \"it\" out. No fevers, or additional symptoms. Reports this has been going on intermittently for years. I advised him to call his ENT he states he drove past there yesterday and they are closed. I told him to still call because some offices are working remotely. Patient stated everything cant be done by a phone and he doesn't understand why he has Doctors he can not see. Patient asked to speak with Dr. Noé Coronado but I informed him that he is unavailable at the moment but that I would send this message to Dr. Noé Coronado to review.

## 2020-06-11 ENCOUNTER — HOSPITAL ENCOUNTER (OUTPATIENT)
Dept: RADIATION THERAPY | Age: 60
Discharge: HOME | End: 2020-06-11
Payer: COMMERCIAL

## 2020-06-11 PROCEDURE — 99211 OFF/OP EST MAY X REQ PHY/QHP: CPT

## 2020-07-22 ENCOUNTER — HOSPITAL ENCOUNTER (OUTPATIENT)
Dept: LAB | Age: 60
Discharge: HOME | End: 2020-07-22
Payer: COMMERCIAL

## 2020-07-22 DIAGNOSIS — C61 MALIGNANT NEOPLASM OF PROSTATE (HCC): ICD-10-CM

## 2020-07-22 LAB
ANION GAP SERPL CALC-SCNC: 6 MMOL/L (ref 3–18)
APPEARANCE UR: CLEAR
ATRIAL RATE: 73 BPM
BASOPHILS # BLD: 0 K/UL (ref 0–0.1)
BASOPHILS NFR BLD: 0 % (ref 0–2)
BILIRUB UR QL: NEGATIVE
BUN SERPL-MCNC: 13 MG/DL (ref 7–18)
BUN/CREAT SERPL: 10 (ref 12–20)
CALCIUM SERPL-MCNC: 9.6 MG/DL (ref 8.5–10.1)
CALCULATED P AXIS, ECG09: 36 DEGREES
CALCULATED R AXIS, ECG10: 11 DEGREES
CALCULATED T AXIS, ECG11: 24 DEGREES
CHLORIDE SERPL-SCNC: 104 MMOL/L (ref 100–111)
CO2 SERPL-SCNC: 31 MMOL/L (ref 21–32)
COLOR UR: YELLOW
CREAT SERPL-MCNC: 1.29 MG/DL (ref 0.6–1.3)
DIAGNOSIS, 93000: NORMAL
DIFFERENTIAL METHOD BLD: ABNORMAL
EOSINOPHIL # BLD: 0.1 K/UL (ref 0–0.4)
EOSINOPHIL NFR BLD: 1 % (ref 0–5)
ERYTHROCYTE [DISTWIDTH] IN BLOOD BY AUTOMATED COUNT: 13.2 % (ref 11.6–14.5)
GLUCOSE SERPL-MCNC: 91 MG/DL (ref 74–99)
GLUCOSE UR STRIP.AUTO-MCNC: NEGATIVE MG/DL
HCT VFR BLD AUTO: 38.5 % (ref 36–48)
HGB BLD-MCNC: 13.2 G/DL (ref 13–16)
HGB UR QL STRIP: NEGATIVE
KETONES UR QL STRIP.AUTO: ABNORMAL MG/DL
LEUKOCYTE ESTERASE UR QL STRIP.AUTO: NEGATIVE
LYMPHOCYTES # BLD: 2.1 K/UL (ref 0.9–3.6)
LYMPHOCYTES NFR BLD: 46 % (ref 21–52)
MCH RBC QN AUTO: 28.2 PG (ref 24–34)
MCHC RBC AUTO-ENTMCNC: 34.3 G/DL (ref 31–37)
MCV RBC AUTO: 82.3 FL (ref 74–97)
MONOCYTES # BLD: 0.4 K/UL (ref 0.05–1.2)
MONOCYTES NFR BLD: 10 % (ref 3–10)
MUCOUS THREADS URNS QL MICRO: ABNORMAL /LPF
NEUTS SEG # BLD: 2 K/UL (ref 1.8–8)
NEUTS SEG NFR BLD: 43 % (ref 40–73)
NITRITE UR QL STRIP.AUTO: NEGATIVE
P-R INTERVAL, ECG05: 162 MS
PH UR STRIP: 5 [PH] (ref 5–8)
PLATELET # BLD AUTO: 221 K/UL (ref 135–420)
PMV BLD AUTO: 9.9 FL (ref 9.2–11.8)
POTASSIUM SERPL-SCNC: 4.4 MMOL/L (ref 3.5–5.5)
PROT UR STRIP-MCNC: NEGATIVE MG/DL
PSA SERPL-MCNC: 4.8 NG/ML (ref 0–4)
Q-T INTERVAL, ECG07: 380 MS
QRS DURATION, ECG06: 92 MS
QTC CALCULATION (BEZET), ECG08: 418 MS
RBC # BLD AUTO: 4.68 M/UL (ref 4.7–5.5)
SODIUM SERPL-SCNC: 141 MMOL/L (ref 136–145)
SP GR UR REFRACTOMETRY: 1.02 (ref 1–1.03)
UROBILINOGEN UR QL STRIP.AUTO: 0.2 EU/DL (ref 0.2–1)
VENTRICULAR RATE, ECG03: 73 BPM
WBC # BLD AUTO: 4.6 K/UL (ref 4.6–13.2)
WBC URNS QL MICRO: ABNORMAL /HPF (ref 0–5)

## 2020-07-22 PROCEDURE — 93005 ELECTROCARDIOGRAM TRACING: CPT

## 2020-07-22 PROCEDURE — 87086 URINE CULTURE/COLONY COUNT: CPT

## 2020-07-22 PROCEDURE — 80048 BASIC METABOLIC PNL TOTAL CA: CPT

## 2020-07-22 PROCEDURE — 84403 ASSAY OF TOTAL TESTOSTERONE: CPT

## 2020-07-22 PROCEDURE — 81001 URINALYSIS AUTO W/SCOPE: CPT

## 2020-07-22 PROCEDURE — 84153 ASSAY OF PSA TOTAL: CPT

## 2020-07-22 PROCEDURE — 36415 COLL VENOUS BLD VENIPUNCTURE: CPT

## 2020-07-22 PROCEDURE — 85025 COMPLETE CBC W/AUTO DIFF WBC: CPT

## 2020-07-23 DIAGNOSIS — E78.5 HYPERLIPIDEMIA, UNSPECIFIED HYPERLIPIDEMIA TYPE: ICD-10-CM

## 2020-07-23 LAB — TESTOST SERPL-MCNC: 344 NG/DL (ref 264–916)

## 2020-07-23 RX ORDER — ATORVASTATIN CALCIUM 80 MG/1
TABLET, FILM COATED ORAL
Qty: 90 TAB | Refills: 3 | Status: SHIPPED | OUTPATIENT
Start: 2020-07-23 | End: 2021-11-21

## 2020-07-24 LAB
BACTERIA SPEC CULT: NORMAL
SERVICE CMNT-IMP: NORMAL

## 2020-07-27 ENCOUNTER — ANESTHESIA EVENT (OUTPATIENT)
Dept: RADIATION THERAPY | Age: 60
End: 2020-07-27
Payer: COMMERCIAL

## 2020-07-27 ENCOUNTER — ANESTHESIA (OUTPATIENT)
Dept: RADIATION THERAPY | Age: 60
End: 2020-07-27
Payer: COMMERCIAL

## 2020-07-27 ENCOUNTER — HOSPITAL ENCOUNTER (OUTPATIENT)
Dept: RADIATION THERAPY | Age: 60
Discharge: HOME | End: 2020-07-27
Payer: COMMERCIAL

## 2020-07-27 VITALS
TEMPERATURE: 97.5 F | HEIGHT: 75 IN | DIASTOLIC BLOOD PRESSURE: 63 MMHG | RESPIRATION RATE: 15 BRPM | WEIGHT: 260.3 LBS | SYSTOLIC BLOOD PRESSURE: 133 MMHG | BODY MASS INDEX: 32.36 KG/M2 | OXYGEN SATURATION: 100 % | HEART RATE: 54 BPM

## 2020-07-27 LAB
GLUCOSE BLD STRIP.AUTO-MCNC: 112 MG/DL (ref 70–110)
GLUCOSE BLD STRIP.AUTO-MCNC: 96 MG/DL (ref 70–110)

## 2020-07-27 PROCEDURE — 82962 GLUCOSE BLOOD TEST: CPT

## 2020-07-27 PROCEDURE — 74011250636 HC RX REV CODE- 250/636: Performed by: UROLOGY

## 2020-07-27 PROCEDURE — 74011000250 HC RX REV CODE- 250: Performed by: ANESTHESIOLOGY

## 2020-07-27 PROCEDURE — 77030018846 HC SOL IRR STRL H20 ICUM -A

## 2020-07-27 PROCEDURE — 51798 US URINE CAPACITY MEASURE: CPT

## 2020-07-27 PROCEDURE — A4648 IMPLANTABLE TISSUE MARKER: HCPCS

## 2020-07-27 PROCEDURE — 73290000068 HC RAD ONC TIME 0.5 TO 1 HR

## 2020-07-27 PROCEDURE — 74011250636 HC RX REV CODE- 250/636: Performed by: ANESTHESIOLOGY

## 2020-07-27 PROCEDURE — 76060000032 HC ANESTHESIA 0.5 TO 1 HR

## 2020-07-27 PROCEDURE — 74011250637 HC RX REV CODE- 250/637: Performed by: UROLOGY

## 2020-07-27 PROCEDURE — 77030015736 HC BLLN ENDOCAV CIVC -B

## 2020-07-27 RX ORDER — SODIUM CHLORIDE 9 MG/ML
100 INJECTION, SOLUTION INTRAVENOUS CONTINUOUS
Status: DISCONTINUED | OUTPATIENT
Start: 2020-07-27 | End: 2020-07-31 | Stop reason: HOSPADM

## 2020-07-27 RX ORDER — FAMOTIDINE 20 MG/1
20 TABLET, FILM COATED ORAL ONCE
Status: COMPLETED | OUTPATIENT
Start: 2020-07-27 | End: 2020-07-27

## 2020-07-27 RX ORDER — CEFAZOLIN SODIUM 2 G/50ML
2 SOLUTION INTRAVENOUS ONCE
Status: COMPLETED | OUTPATIENT
Start: 2020-07-27 | End: 2020-07-27

## 2020-07-27 RX ORDER — ONDANSETRON 2 MG/ML
INJECTION INTRAMUSCULAR; INTRAVENOUS AS NEEDED
Status: DISCONTINUED | OUTPATIENT
Start: 2020-07-27 | End: 2020-07-27 | Stop reason: HOSPADM

## 2020-07-27 RX ORDER — LIDOCAINE HYDROCHLORIDE 20 MG/ML
INJECTION, SOLUTION EPIDURAL; INFILTRATION; INTRACAUDAL; PERINEURAL AS NEEDED
Status: DISCONTINUED | OUTPATIENT
Start: 2020-07-27 | End: 2020-07-27 | Stop reason: HOSPADM

## 2020-07-27 RX ORDER — SODIUM CHLORIDE 0.9 % (FLUSH) 0.9 %
5-10 SYRINGE (ML) INJECTION EVERY 8 HOURS
Status: DISCONTINUED | OUTPATIENT
Start: 2020-07-27 | End: 2020-07-31 | Stop reason: HOSPADM

## 2020-07-27 RX ORDER — PROPOFOL 10 MG/ML
INJECTION, EMULSION INTRAVENOUS AS NEEDED
Status: DISCONTINUED | OUTPATIENT
Start: 2020-07-27 | End: 2020-07-27 | Stop reason: HOSPADM

## 2020-07-27 RX ORDER — SODIUM CHLORIDE 0.9 % (FLUSH) 0.9 %
5 SYRINGE (ML) INJECTION ONCE
Status: COMPLETED | OUTPATIENT
Start: 2020-07-27 | End: 2020-07-27

## 2020-07-27 RX ORDER — FENTANYL CITRATE 50 UG/ML
INJECTION, SOLUTION INTRAMUSCULAR; INTRAVENOUS AS NEEDED
Status: DISCONTINUED | OUTPATIENT
Start: 2020-07-27 | End: 2020-07-27 | Stop reason: HOSPADM

## 2020-07-27 RX ADMIN — FAMOTIDINE 20 MG: 20 TABLET ORAL at 08:07

## 2020-07-27 RX ADMIN — LIDOCAINE HYDROCHLORIDE 100 MG: 20 INJECTION, SOLUTION INTRAVENOUS at 08:57

## 2020-07-27 RX ADMIN — SODIUM CHLORIDE 100 ML/HR: 900 INJECTION, SOLUTION INTRAVENOUS at 08:07

## 2020-07-27 RX ADMIN — Medication 5 ML: at 08:07

## 2020-07-27 RX ADMIN — ONDANSETRON 8 MG: 2 SOLUTION INTRAMUSCULAR; INTRAVENOUS at 09:06

## 2020-07-27 RX ADMIN — CEFAZOLIN 3000 MG: 10 INJECTION, POWDER, FOR SOLUTION INTRAVENOUS at 08:53

## 2020-07-27 RX ADMIN — FENTANYL CITRATE 50 MCG: 50 INJECTION, SOLUTION INTRAMUSCULAR; INTRAVENOUS at 09:06

## 2020-07-27 RX ADMIN — PROPOFOL 300 MG: 10 INJECTION, EMULSION INTRAVENOUS at 08:57

## 2020-07-27 NOTE — H&P
History and Physical  Yael Siegel  : 1960  Encounter date: 2020        ASSESSMENT:         Encounter Diagnoses       ICD-10-CM ICD-9-CM   1. [facility] C61 185     -Clinical stage [id number] GS 3+3, prostate cancer in 3-10% of 5/12 total cores, s/p TRUS biopsy on 19. TRUS vol 45.21. Presenting PSA 4.4ng/mL     -BPH with LUTS, nocturia x6-7. Not on any  meds.      -ED, no benefit from tadalafil 20 mg      -PMH notable for Sickle Cell anemia, DM      PLAN:   Patient has decided on Hypofractionantion IMRT.              Chief Complaint   Patient presents with    Prostate Cancer        HISTORY OF PRESENT ILLNESS:  Yael Siegel is a 61 y.o. male who is seen in follow up for prostate cancer. S/p TRUS biopsy on 19, pathology revealed Chandu 3+3 in 5/12 cores. TRUS vol 45.21 cc. Presenting PSA 4.4ng/mL. Most recent PSA 3.26 ng/ml on 19.      Pt consulted with Dr. Dejan Katz about radiation options. He did not recommend brachytherapy given his LUTS and stated he could be a candidate for EBRT, although patient declined FRANSICO with Dr. Dejan Katz which is necessary prior to proceeding with radiation. He has also met with a prostate cancer support group.      In regards to voiding, he has nocturia 6-7x, incomplete emptying and double voiding. He is not on any  meds. Denies any gross hematuria or dysuria. Asymptomatic for UTI. No f/c/n/v.      Stable appetite and weight. No bone or back pain. Has not worked in 4 weeks seciondary to D1G otherwise doing well.     ED, reports no benefit from tadalafil 20 mg. He notes he did not take med on an empty stomach.      FH of prostate cancer in three brothers (one s/p RALP, one s/p XRT and one s/p brachy)- one brother has since passed (unsure if this was secondary to CaP). Has talked to brother who had brachytherapy who c/o ED SE     PMH notable for Sickle Cell anemia, DM   Patient has decided on hypofractionated IMRT with [facility].   He is now here for Fiducial marker implant. The patient is distraught that Lima Fonder will not be done as [employer] denied this request.  We do this procedure without Space OAR and have very rare radiation complications. The patient accepts this and we will proceed .            Past Medical History:   Diagnosis Date    Allergic rhinitis      BPH (benign prostatic hyperplasia)      Cardiac arrhythmia      Deviated septum and epistaxis Dr. Piedad Lincoln 2009      Diabetes mellitus ([facility]       on basis of elev a1c 12/15    ED (erectile dysfunction)      Elevated PSA      Fatty liver       on US 6/10; Fib-4 was 0.91 from 5/12    FHx: prostate cancer       2 bros    H. pylori infection       EGD w dilation Dr Vicente Summers 3/15    HSV (herpes simplex virus) infection      Hyperlipidemia      Hypertension      Obesity       peak 292 lbs, bmi 37.8 from 2/14; IF 4/18 start weight 280 lbs    Prediabetes 3/10     2 hr GTT nl     Prolactinoma (HCC)       Dr. Arik Robbins Prostate cancer [facility]) 02/2019     Dr Meyer Miss bx 6/12+, psa 4.4, [place] 3+3; 2nd opinion Dr Spring Smith; now seeing Dr Montez Calvillo Sickle cell anemia [facility])                Past Surgical History:   Procedure Laterality Date    HX COLONOSCOPY         Dr. William Palacio 6/12 negative    HX CYSTECTOMY        HX GI   2/01     negative barium enema    HX ORTHOPAEDIC   11/2015     medial meniscus tear left knee Dr Carmen Fuchs   03/2018     CT head neg        Social History           Tobacco Use    Smoking status: Never Smoker    Smokeless tobacco: Never Used   Substance Use Topics    Alcohol use: No    Drug use:  No              Allergies   Allergen Reactions    Hydrochlorothiazide Other (comments)       Weight loss, pt unsure of allergy       Metformin Other (comments)       paresthesia              Family History   Problem Relation Age of Onset    Hypertension Mother      Hypertension Father      Stroke Father      Diabetes Sister      Cancer Brother      Prostate Cancer Brother                 Current Outpatient Medications   Medication Sig Dispense Refill    fluticasone propionate (Flonase Allergy Relief) 50 mcg/actuation nasal spray 2 Sprays by Both Nostrils route daily as needed for Rhinitis or Allergies. 1 Bottle 0    sodium chloride (Saline Nasal Mist) 0.65 % nasal squeeze bottle 0.05 mL by Both Nostrils route as needed for Congestion. 30 mL 0    SITagliptin (JANUVIA) 100 mg tablet Take 1 Tab by mouth daily. 30 Tab 11    tadalafil (CIALIS) 20 mg tablet Take 1 Tab by mouth as needed (Daily as needed). Indications: the inability to have an erection 30 Tab 11    amLODIPine-benazepril (LOTREL) 5-20 mg per capsule take 1 capsule by mouth once daily 90 Cap 3    ezetimibe (ZETIA) 10 mg tablet take 1 tablet by mouth once daily 30 Tab 11    valACYclovir (VALTREX) 1 gram tablet take 1 tablet by mouth once daily 90 Tab 3    atorvastatin (LIPITOR) 80 mg tablet Take 1 Tab by mouth daily. 90 Tab 3    multivitamin (ONE A DAY) tablet Take 1 Tab by mouth daily.        OTHER Vitamin A supplement        glucose blood VI test strips (ONETOUCH VERIO) strip Patient checks blood sugar daily, Dx E11.9 100 Strip 3    glucose blood VI test strips (ONETOUCH ULTRA TEST) strip Patient to test Blood sugar 1 a day DX: E11.65 100 Strip 3        Review of Systems  Constitutional: Fever: No  Skin: Rash: No  HEENT: Hearing difficulty: No  Eyes: Blurred vision: No  Cardiovascular: Chest pain: No  Respiratory: Shortness of breath: No  Gastrointestinal: Nausea/vomiting: No  Musculoskeletal: Back pain: No  Neurological: Weakness: No  Psychological: Memory loss: No  Comments/additional findings:            PHYSICAL EXAMINATION:   Visit Vitals  Ht 6' 3\" (1.905 m)   Wt 269 lb (122 kg)   BMI 33.62 kg/m²     Constitutional: WDWN, Pleasant and appropriate affect, No acute distress.     CV:  No peripheral swelling noted, RRR  Respiratory: No respiratory distress or difficulties, CTAB. Abdomen:  No tenderness. No CVA tenderness. Skin: No jaundice. Neuro/Psych:  Alert and oriented x 3, affect appropriate. Lymphatic:   No enlarged subclavicular lymph nodes.          REVIEW OF LABS AND IMAGING:          Results for orders placed or performed in visit on 04/28/20   PROSTATE SPECIFIC ANTIGEN, TOTAL (PSA)   Result Value Ref Range     Prostate Specific Ag 3.61 0.00 - 4.00 ng/mL        TRUS Biopsy Pathology 2/27/19  DIAGNOSIS:   A. Right [place] Needle biopsy                  ADENOCARCINOMA, CASSY SCORE 3 + 3 = 6 involving 5 % of the specimen (1 of 1 core(s) positive).              Grade Group 1. Ends not involved by the tumor. B. Right Mid Needle biopsy                  Benign prostatic tissue. C. Right Base Needle biopsy                  Benign prostatic tissue. D. Right Lateral [place] Needle biopsy                  ADENOCARCINOMA, CASSY SCORE 3 + 3 = 6 involving 10 % of the specimen (1 of 1 core(s) positive).              Grade Group 1. Ends not involved by the tumor. E. Right Lateral Mid Needle biopsy                  ADENOCARCINOMA, CASSY SCORE 3 + 3 = 6 involving 5 % of the specimen (1 of 1 core(s) positive).              Grade Group 1. Ends not involved by the tumor. F. Right Lateral Base Needle biopsy                  ADENOCARCINOMA, CASSY SCORE 3 + 3 = 6 involving 3 % of the specimen (1 of 1 core(s) positive).              Grade Group 1. Ends not involved by the tumor. G. Left [place] Needle biopsy                  Benign prostatic tissue. H. Left Mid Needle biopsy                  Benign prostatic tissue. I. Left Base Needle biopsy                  Benign prostatic tissue. J. Left Lateral [place] Needle biopsy                  Benign prostatic tissue. K. Left Lateral Mid Needle biopsy                  ADENOCARCINOMA, CASSY SCORE 3 + 3 = 6 involving 10 % of the specimen (1 of 1 core(s) positive).              Grade Group 1.  Ends not involved by the tumor.    L. Left Lateral Base Needle biopsy                  Benign prostatic tissue.      Geni Dykes MD

## 2020-07-27 NOTE — ROUTINE PROCESS
07.15: Patient arrived for procedure. A&Ox4. Per patients report bowel prep complete. NPO since midnight. Reviewed Allergies and PTA medications. Consent verified and in chart. Denies pain or any other discomfort. Denies delusions, hallucinations, mood swings, depression, euphoria or suicidal ideation.

## 2020-07-27 NOTE — ROUTINE PROCESS
POST PROCEDURE NOTE    Pt arrived to post procedure area A at 0927, pt in supine with head of bed elevated. Patient voided 150ml of jose colored urine. Bladder scan performed with the patient having 120ml of residual urine in the bladder. Patient discharged from procedure area A: 1050    Post Operative instruction and Discharge information reviewed and copy given to patient and wife. The patient and spouse verbalized understanding. 1055 D/C'd via W/C with wife driving transportation home.

## 2020-07-27 NOTE — OP NOTES
TRANSRECTAL ULTRASOUND  GUIDED FIDUCIAL PROSTATE SEED PLACEMENT    Current Outpatient Medications on File Prior to Encounter   Medication Sig Dispense Refill    atorvastatin (LIPITOR) 80 mg tablet take 1 tablet by mouth once daily 90 Tab 3    fluticasone propionate (Flonase Allergy Relief) 50 mcg/actuation nasal spray 2 Sprays by Both Nostrils route daily as needed for Rhinitis or Allergies. 1 Bottle 0    sodium chloride (Saline Nasal Mist) 0.65 % nasal squeeze bottle 0.05 mL by Both Nostrils route as needed for Congestion. 30 mL 0    SITagliptin (JANUVIA) 100 mg tablet Take 1 Tab by mouth daily. 30 Tab 11    tadalafil (CIALIS) 20 mg tablet Take 1 Tab by mouth as needed (Daily as needed). Indications: the inability to have an erection 30 Tab 11    amLODIPine-benazepril (LOTREL) 5-20 mg per capsule take 1 capsule by mouth once daily 90 Cap 3    ezetimibe (ZETIA) 10 mg tablet take 1 tablet by mouth once daily 30 Tab 11    multivitamin (ONE A DAY) tablet Take 1 Tab by mouth daily.  valACYclovir (VALTREX) 1 gram tablet take 1 tablet by mouth once daily 90 Tab 3    OTHER Vitamin A supplement      glucose blood VI test strips (ONETOUCH VERIO) strip Patient checks blood sugar daily, Dx E11.9 100 Strip 3    glucose blood VI test strips (ONETOUCH ULTRA TEST) strip Patient to test Blood sugar 1 a day DX: E11.65 100 Strip 3     No current facility-administered medications on file prior to encounter.         Allergies   Allergen Reactions    Hydrochlorothiazide Other (comments)     Weight loss, pt unsure of allergy      Metformin Other (comments)     paresthesia       Preoperative diagnosis:  Prostate Cancer    Postoperative diagnosis:  Same    Procedure performed:  Transrectal US guided placement of prostate fiducial markers    Surgeon: Dr Mary Richardson    Palpable Nodule: No    Anesthesia: General    EBL: minimal    Specimens:  none     Findings: 35cc prostate implanted with 3 fiducial seeds in usual triangular fashion.  10cc SpaceOAR hydrogel implanted as well.     Complications: none     Implants:   * No implants in log *    Indications:  Leatha Joiner is a 61 y.o. male with diagnosis of Bowers 6 (3+3) adenocarcinoma of the prostate in 5/12 cores on 2/27/2019. Pre-biopsy PSA 4,4 ng/mL. TRUS volume 35 grams   Patient has elected for hypofractionated IMRT.  Discussed in detail the procedure for placement of fiducial markers. Reviewed indications, r/b, and SEs of fiducial marker/s. Patient expresses understanding and desire to proceed. All questions answered.       Procedure: The patient was placed in lithotomy position. A surgical time out was performed and all were in agreement. Caren-operative antibiotics and Sequential compression devices were in place. A digital rectal exam was performed which revealed 35cc prostate. An endorectal probe mounted to the surgical table was placed in the rectum. A prostatic block with 1% Lidocaine was injected in an appropriate fashion in the perineum. Transrectal US volumetrics taken. Three needles were placed into the perineum under ultrasound guidance and three [employer] fiducial markers were placed into the right base, right apex and left lateral prostate gland.     The probe was removed and the patient was observed. Fluoroscopy images of the pelvis was performed confirming appropriate placement of the seeds. Bacitracin was placed along the perineum.  The patient was returned to the supine position and was transferred to the recovery bed thus concluding the procedure           Sindy Phillips MD

## 2020-07-27 NOTE — DISCHARGE INSTRUCTIONS
Fiducial Markers Instructions    During the first few days you may have some bruising on the perineum (the place between your anus and your scrotum) or on the scrotum itself. This is caused by the needles (usually 2-3) which are used to place the Fiducial Markers. This will resolve on its own and usually does not cause any discomfort. For about a week after treatment, you may have some pain or swelling in the area between your scrotum and rectum, and your urine may be reddish-brown. Rarely a larger hematoma may form on the perineum and this will cause some discomfort with sitting. This should be brought to the doctors attention, but it will resolve on its own. You can alternate between an ice pack and heat pack for 10 minutes interval to assist with the reduction of inflammation and pain to perineum. Side Effects    Immediately post procedure: blood in the urine, bruising/tenderness/discoloration at the implant site, and/or swelling at the implant site. These symptoms should subside after a few days. Diet:      Regular, unless you are on a special diet for other reasons. Medication:     Patient may continue the following medications as directed by Physician:      Activity:     Avoid heavy lifting or strenuous physical activity for the first two days after the procedure. At that time you may return to your normal activity level if the urine is clear and you feel fine. If the urine is still bloody you should rest and drink plenty of fluids until it is clear, and then you may resume normal activity. Depending on the demands of your job, you may return to work any time during the week after the procedure, noting the precaution about prolonged sitting. You may return to a regular diet as tolerated following the procedure. You will most likely experience a reddish color in your ejaculate for a few weeks following the procedure.  This is normal and will improve as time  passes, if it persists, see your doctor.     Follow up     [facility]  on ______at _____ am.    Please call us if you have any questions: [phone]    Radiation Therapy

## 2020-07-27 NOTE — ANESTHESIA PREPROCEDURE EVALUATION
Relevant Problems   No relevant active problems       Anesthetic History   No history of anesthetic complications            Review of Systems / Medical History  Patient summary reviewed, nursing notes reviewed and pertinent labs reviewed    Pulmonary  Within defined limits                 Neuro/Psych   Within defined limits           Cardiovascular    Hypertension        Dysrhythmias            GI/Hepatic/Renal           Liver disease     Endo/Other    Diabetes    Morbid obesity     Other Findings   Comments: Hypertension  Allergic rhinitis   HSV (herpes simplex virus) infection  ED (erectile dysfunction)   Fatty liver  Obesity   Deviated septum and epistaxis Dr. Scott Mays 2009  Prolactinoma [facility])   Hyperlipidemia  H. pylori infection   Diabetes mellitus ([facility]  Cardiac arrhythmia   [facility]  FHx: prostate cancer   BPH (benign prostatic hyperplasia)  Elevated PSA   Prostate cancer (HCC)  IFG (impaired fasting glucose)   Colon polyp               Physical Exam    Airway  Mallampati: II  TM Distance: > 6 cm  Neck ROM: normal range of motion   Mouth opening: Normal     Cardiovascular    Rhythm: regular  Rate: normal         Dental  No notable dental hx       Pulmonary  Breath sounds clear to auscultation               Abdominal  GI exam deferred       Other Findings            Anesthetic Plan    ASA: 3  Anesthesia type: general          Induction: Intravenous

## 2020-07-27 NOTE — PROGRESS NOTES
PROCEDURE NOTE    Pt arrived to procedure room at 0853, pt placed supine and monitors placed.       Body aligned SCDs placed LEX LE, Pt arms on armboard with eggcrate for pressure support, head remains aligned Right & Left Leg placed in Yellow Fin Stirrups, eggcrates & gelpads for pressure points    Time-out performed: 0906    Procedure Start: 297    Procedure stop: 5757

## 2020-07-27 NOTE — ANESTHESIA POSTPROCEDURE EVALUATION
* No procedures listed *.    general    Anesthesia Post Evaluation      Multimodal analgesia: multimodal analgesia used between 6 hours prior to anesthesia start to PACU discharge  Patient location during evaluation: bedside  Patient participation: complete - patient participated  Level of consciousness: awake  Pain management: adequate  Airway patency: patent  Anesthetic complications: no  Cardiovascular status: stable  Respiratory status: acceptable  Hydration status: acceptable  Post anesthesia nausea and vomiting:  controlled      INITIAL Post-op Vital signs:   Vitals Value Taken Time   /72 7/27/2020  9:31 AM   Temp 36.5 °C (97.7 °F) 7/27/2020  9:31 AM   Pulse 59 7/27/2020  9:31 AM   Resp 14 7/27/2020  9:31 AM   SpO2 100 % 7/27/2020  9:31 AM

## 2020-08-05 ENCOUNTER — HOSPITAL ENCOUNTER (OUTPATIENT)
Dept: RADIATION THERAPY | Age: 60
Discharge: HOME | End: 2020-08-05
Payer: COMMERCIAL

## 2020-08-05 ENCOUNTER — HOSPITAL ENCOUNTER (OUTPATIENT)
Dept: MRI IMAGING | Age: 60
Discharge: HOME | End: 2020-08-05
Attending: RADIOLOGY
Payer: COMMERCIAL

## 2020-08-05 DIAGNOSIS — C61 PROSTATE CANCER (HCC): ICD-10-CM

## 2020-08-05 LAB — CREAT UR-MCNC: 1.3 MG/DL (ref 0.6–1.3)

## 2020-08-05 PROCEDURE — 77334 RADIATION TREATMENT AID(S): CPT

## 2020-08-05 PROCEDURE — 82565 ASSAY OF CREATININE: CPT

## 2020-08-05 NOTE — PROGRESS NOTES
61 y.o. BLACK OR  male who presents for f/u    He was dx'ed with prostate ca as below and will be undergoing brachytherapy in the near future    Denies any cardiovascular complaints. However, he is wondering if his bp is running low. He's had instances which he feels lightheaded when he gets up and gets tired easily    Denied any gi complaints    Doing better with the diet and he's down to 1-2 meals a day although still not much weight loss as below.   Not checking his sugars much    Vitals 8/7/2020 7/27/2020 7/27/2020 7/27/2020 7/27/2020 7/27/2020   Weight 268 lb          Vitals 2/17/2020 2/4/2020 11/11/2019 7/17/2019 7/17/2019   Weight 271 lb 271 lb 270 lb 267 lb 267 lb     IF 4/18    Past Medical History:   Diagnosis Date    Allergic rhinitis     BPH (benign prostatic hyperplasia)     Cardiac arrhythmia     Colon polyp 07/15/2020    Dr Tim Velasquez    Deviated septum and epistaxis Dr. Scott Mays 2009     Diabetes mellitus ([facility]     on basis of elev a1c 12/15    ED (erectile dysfunction)     Elevated PSA     Fatty liver     on US 6/10; Fib-4 was 0.91 from 5/12    FHx: prostate cancer     2 bros    H. pylori infection     EGD w dilation Dr Devan Mijares 3/15    HSV (herpes simplex virus) infection     Hyperlipidemia     Hypertension     IFG (impaired fasting glucose) 03/2010    2 hr GTT nl     Obesity     peak 292 lbs, bmi 37.8 from 2/14; IF 4/18 start weight 280 lbs    Prolactinoma ([facility]     Dr. Kathi Bermudez Prostate cancer [facility]) 02/2019    Dr Gaston Connell bx 6/12+, psa 4.4, Chandu 3+3; 2nd opinion Dr Salinas Going; now seeing Dr Selam Faustin Sickle cell anemia [facility])      Past Surgical History:   Procedure Laterality Date   Samson Song 6/12 neg; Dr Tim Velasquez 7/15/20 polyp    HX CYSTECTOMY      HX GI  2/01    negative barium enema    HX ORTHOPAEDIC  11/2015    medial meniscus tear left knee Dr Jhon Edwards  03/2018    CT head neg     Social History Socioeconomic History    Marital status:      Spouse name: Not on file    Number of children: 2    Years of education: Not on file    Highest education level: Not on file   Occupational History    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   Social Needs    Financial resource strain: Not on file    Food insecurity     Worry: Not on file     Inability: Not on file   [employer] needs     Medical: Not on file     Non-medical: Not on file   Tobacco Use    Smoking status: Never Smoker    Smokeless tobacco: Never Used   Substance and Sexual Activity    Alcohol use: No    Drug use: No    Sexual activity: Yes   Lifestyle    Physical activity     Days per week: Not on file     Minutes per session: Not on file    Stress: Not on file   Relationships    Social connections     Talks on phone: Not on file     Gets together: Not on file     Attends [employer] service: Not on file     Active member of club or organization: Not on file     Attends meetings of clubs or organizations: Not on file     Relationship status: Not on file    Intimate partner violence     Fear of current or ex partner: Not on file     Emotionally abused: Not on file     Physically abused: Not on file     Forced sexual activity: Not on file   Other Topics Concern    Not on file   Social History Narrative    Not on file     Allergies   Allergen Reactions    Hydrochlorothiazide Other (comments)     Weight loss, pt unsure of allergy      Metformin Other (comments)     paresthesia     Current Outpatient Medications   Medication Sig    benazepriL (LOTENSIN) 20 mg tablet Take 1 Tab by mouth daily.  atorvastatin (LIPITOR) 80 mg tablet take 1 tablet by mouth once daily    fluticasone propionate (Flonase Allergy Relief) 50 mcg/actuation nasal spray 2 Sprays by Both Nostrils route daily as needed for Rhinitis or Allergies.  sodium chloride (Saline Nasal Mist) 0.65 % nasal squeeze bottle 0.05 mL by Both Nostrils route as needed for Congestion.  SITagliptin (JANUVIA) 100 mg tablet Take 1 Tab by mouth daily.  tadalafil (CIALIS) 20 mg tablet Take 1 Tab by mouth as needed (Daily as needed). Indications: the inability to have an erection    ezetimibe (ZETIA) 10 mg tablet take 1 tablet by mouth once daily    valACYclovir (VALTREX) 1 gram tablet take 1 tablet by mouth once daily    multivitamin (ONE A DAY) tablet Take 1 Tab by mouth daily.  OTHER Vitamin A supplement    glucose blood VI test strips (ONETOUCH VERIO) strip Patient checks blood sugar daily, Dx E11.9    glucose blood VI test strips (ONETOUCH ULTRA TEST) strip Patient to test Blood sugar 1 a day DX: E11.65     No current facility-administered medications for this visit. REVIEW OF SYSTEMS: colo 6/12 Dr Isauro Marley  Ophtho - no vision change or eye pain  Oral - no mouth pain, tongue or tooth problems  Ears - no hearing loss, ear pain, fullness, no swallowing problems  Cardiac - no CP, PND, orthopnea, edema, palpitations or syncope  Chest - no breast masses  Resp - no wheezing, chronic coughing, dyspnea  GI - no heartburn, nausea, vomiting, change in bowel habits, bleeding, hemorrhoids  Urinary - no dysuria, hematuria, flank pain, urgency, frequency    Visit Vitals  /77   Pulse 96   Temp 96.8 °F (36 °C) (Temporal)   Resp 14   Ht 6' 3\" (1.905 m)   Wt 268 lb (121.6 kg)   SpO2 99%   BMI 33.50 kg/m²     LYING  133/82 and 85  SITTING 112/77 and 96  STANDING       85/54 and 109    A&O x3  Affect is appropriate. Mood stable  No apparent distress  Anicteric, no JVD, adenopathy or thyromegaly. Tonsilliths on the left. Oral exam otherwise normal  No carotid bruits or radiated murmur  Lungs clear to auscultation, no wheezes or rales  Heart showed regular rate and rhythm. No murmur, rubs, gallops  Abdomen soft nontender, no hepatosplenomegaly or masses. Extremities without edema.   Pulses 1-2+ symmetrically    LABS  From 2/10 showed    gluc 111, cr 1.10, gfr>60, alt 51, hba1c 6.0, chol 278, tg 122,  hdl 56, ldl-c 198, wbc 3.2, hb 13.8, plt 230, psa 0.60  From 3/10 showed                                         2hr GTT 88  From 6/10 showed    gluc 105, cr 1.30, gfr>60, alt 59,                              wbc 3.6, hb 13.9, plt 237  From 10/11 showed  gluc 102, cr 1.18, gfr 82,  alt 22, hba1c 6.5,                             wbc 3.5, hb 13.5, plt 255, psa 1.20, prl 30.8  From 11/11 showed                ldl-p 1245,                           2 hr GTT 89  From 2/12 showed                                   ua neg  From 5/12 showed    gluc 109, cr 1.10, gfr>60, alt 17, hba1c 6.1,                   chol 224, tg 70,   hdl 70, ldl-c 140, wbc 3.8, hb 14.3, plt 250, psa 1.08  From 10/12 showed  gluc 98,   cr 1.00, gfr>60, alt 23, hba1c 6.2, ldl-p 1647, chol 216, tg 64,   hdl 63, ldl-c 148  From 1/14 showed    gluc 99,   cr 1.30, gfr 58,  alt 31, hba1c 6.4,      chol 196, tg 81,   hdl 67, ldl-c 113  From 8/14 showed    gluc 100, cr 1.20, gfr>60, alt 19, hba1c 6.2, umar neg  chol 288, tg 140, hdl 69, ldl-c 191         From 9/14 showed                       tsh 1.84  From 2/15 showed    gluc 118, cr 1.34, gfr 68,  alt 19, hba1c 6.4, umar neg  chol 301, tg 190, hdl 63, ldl-c 200,     From 8/15 showed         hba1c 6.3,      chol 267, tg 90,   hdl 70, ldl-c 179  From 12/15 showed  gluc 102, cr 1.29, gfr>60,     hba1c 6.8, umar neg  From 3/16 showed    gluc 96,   cr 1.23, gfr>60,                   wbc 4.1, hb 13.3, plt 240  From 10/16 showed  gluc 110, cr 1.13, gfr>60, alt 29, hba1c 6.4,      chol 233, tg 120, hdl 73, ldl-c 136  From 9/17 showed    gluc 99,   cr 1.25, gfr>60, alt 29, hba1c 6.5, umar neg, chol 240, tg 93,   hdl 79, ldl-c 142, wbc 3.9, hb 14.4, plt 217, psa 3.80  From 3/18 showed    gluc 103, cr 1.30, gfr>60, alt 27, hba1c 6.3,      chol 251, tg 96,   hdl 88, ldl-c 144, tsh 2.69,                  psa 3.70, b12 559, fol 14.1, free t4 1.20, hiv neg, rpr neg, hep b/c neg  From 1/19 showed                           umar neg  chol 156, tg 47,   hdl 99, ldl-c 48,           From 2/19 showed                    hba1c 7.0,                               hep b/c neg, hiv neg, tpal neg  From 7/19 showed    gluc 103, cr 1.38, gfr>60  From 2/20 showed    gluc 92,   cr 1.22, gfr>60, alt 30, hba1c 6.3, umar neg, chol 160, tg 42,   hdl 91, ldl-c 61,   wbc 3.8, hb 13.7, plt 217  From 3/20 showed gluc 90, cr 1.30, gfr>60, psa 3.61 ferritin 283, b12 477, fol 11.9  From 8/20 showed gluc 91, cr 1.29, gfr>60, psa 4.8    Patient Active Problem List   Diagnosis Code    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 9) [facility] E66.01    Nocturia R35.1    Prostate cancer ([facility] C61     Assessment and plan:  1. Ortho. F/U Dr Arlene Padilla  2. Obesity. Lifestyle and dietary measures, previously declined appetite supp. Trying to stick with IF  3. DM. Continue current regimen and doing well  4. Erectile dysfunction. Prn cialis  5. Prolactinoma. Continue dostinex  6. HLP. Continue current regimen. 7.  HTN. With the orthostasis, will hold the amlodipine. He reports he drinks plenty of fluids; call in bp readings in 2-3 weeks  8. Prostate ca. For brachytherapy  9. R/o LORIE. He has not been to neuro after we referred him          RTC 2/20    Above conditions discussed at length and patient vocalized understanding.   All questions answered to patient satisfaction

## 2020-08-06 ENCOUNTER — HOSPITAL ENCOUNTER (OUTPATIENT)
Dept: RADIATION THERAPY | Age: 60
Discharge: HOME | End: 2020-08-06
Payer: COMMERCIAL

## 2020-08-07 ENCOUNTER — OFFICE VISIT (OUTPATIENT)
Dept: INTERNAL MEDICINE CLINIC | Age: 60
End: 2020-08-07

## 2020-08-07 ENCOUNTER — HOSPITAL ENCOUNTER (OUTPATIENT)
Dept: RADIATION THERAPY | Age: 60
Discharge: HOME | End: 2020-08-07
Payer: COMMERCIAL

## 2020-08-07 VITALS
OXYGEN SATURATION: 99 % | RESPIRATION RATE: 14 BRPM | HEART RATE: 96 BPM | SYSTOLIC BLOOD PRESSURE: 112 MMHG | TEMPERATURE: 96.8 F | HEIGHT: 75 IN | WEIGHT: 268 LBS | DIASTOLIC BLOOD PRESSURE: 77 MMHG | BODY MASS INDEX: 33.32 KG/M2

## 2020-08-07 DIAGNOSIS — E11.65 UNCONTROLLED TYPE 2 DIABETES MELLITUS WITH HYPERGLYCEMIA (HCC): ICD-10-CM

## 2020-08-07 DIAGNOSIS — E78.5 HYPERLIPIDEMIA, UNSPECIFIED HYPERLIPIDEMIA TYPE: ICD-10-CM

## 2020-08-07 DIAGNOSIS — D35.2 PROLACTINOMA (HCC): ICD-10-CM

## 2020-08-07 DIAGNOSIS — C61 PROSTATE CANCER (HCC): ICD-10-CM

## 2020-08-07 DIAGNOSIS — E66.01 SEVERE OBESITY (BMI 35.0-39.9) WITH COMORBIDITY (HCC): ICD-10-CM

## 2020-08-07 DIAGNOSIS — I10 ESSENTIAL HYPERTENSION: Primary | ICD-10-CM

## 2020-08-07 RX ORDER — BENAZEPRIL HYDROCHLORIDE 20 MG/1
20 TABLET ORAL DAILY
Qty: 90 TAB | Refills: 3 | Status: SHIPPED | OUTPATIENT
Start: 2020-08-07 | End: 2022-01-31

## 2020-08-10 ENCOUNTER — HOSPITAL ENCOUNTER (OUTPATIENT)
Dept: RADIATION THERAPY | Age: 60
Discharge: HOME | End: 2020-08-10
Payer: COMMERCIAL

## 2020-08-11 ENCOUNTER — HOSPITAL ENCOUNTER (OUTPATIENT)
Dept: RADIATION THERAPY | Age: 60
Discharge: HOME | End: 2020-08-11
Payer: COMMERCIAL

## 2020-08-14 ENCOUNTER — HOSPITAL ENCOUNTER (OUTPATIENT)
Dept: RADIATION THERAPY | Age: 60
Discharge: HOME | End: 2020-08-14
Payer: COMMERCIAL

## 2020-08-14 PROCEDURE — 77300 RADIATION THERAPY DOSE PLAN: CPT

## 2020-08-14 PROCEDURE — 77338 DESIGN MLC DEVICE FOR IMRT: CPT

## 2020-08-14 PROCEDURE — 77301 RADIOTHERAPY DOSE PLAN IMRT: CPT

## 2020-08-17 ENCOUNTER — HOSPITAL ENCOUNTER (OUTPATIENT)
Dept: RADIATION THERAPY | Age: 60
Discharge: HOME | End: 2020-08-17
Payer: COMMERCIAL

## 2020-08-17 PROCEDURE — 77385 HC IMRT TRMT DLVR SMPL: CPT

## 2020-08-18 ENCOUNTER — HOSPITAL ENCOUNTER (OUTPATIENT)
Dept: RADIATION THERAPY | Age: 60
Discharge: HOME | End: 2020-08-18
Payer: COMMERCIAL

## 2020-08-18 PROCEDURE — 77385 HC IMRT TRMT DLVR SMPL: CPT

## 2020-08-19 ENCOUNTER — HOSPITAL ENCOUNTER (OUTPATIENT)
Dept: RADIATION THERAPY | Age: 60
Discharge: HOME | End: 2020-08-19
Payer: COMMERCIAL

## 2020-08-19 PROCEDURE — 77385 HC IMRT TRMT DLVR SMPL: CPT

## 2020-08-20 ENCOUNTER — HOSPITAL ENCOUNTER (OUTPATIENT)
Dept: RADIATION THERAPY | Age: 60
Discharge: HOME | End: 2020-08-20
Payer: COMMERCIAL

## 2020-08-20 PROCEDURE — 77385 HC IMRT TRMT DLVR SMPL: CPT

## 2020-08-21 ENCOUNTER — HOSPITAL ENCOUNTER (OUTPATIENT)
Dept: RADIATION THERAPY | Age: 60
Discharge: HOME | End: 2020-08-21
Payer: COMMERCIAL

## 2020-08-21 PROCEDURE — 77385 HC IMRT TRMT DLVR SMPL: CPT

## 2020-08-21 PROCEDURE — 77336 RADIATION PHYSICS CONSULT: CPT

## 2020-08-24 ENCOUNTER — HOSPITAL ENCOUNTER (OUTPATIENT)
Dept: RADIATION THERAPY | Age: 60
Discharge: HOME | End: 2020-08-24
Payer: COMMERCIAL

## 2020-08-24 PROCEDURE — 77385 HC IMRT TRMT DLVR SMPL: CPT

## 2020-08-25 ENCOUNTER — HOSPITAL ENCOUNTER (OUTPATIENT)
Dept: RADIATION THERAPY | Age: 60
Discharge: HOME | End: 2020-08-25
Payer: COMMERCIAL

## 2020-08-25 PROCEDURE — 77385 HC IMRT TRMT DLVR SMPL: CPT

## 2020-08-26 ENCOUNTER — HOSPITAL ENCOUNTER (OUTPATIENT)
Dept: RADIATION THERAPY | Age: 60
Discharge: HOME | End: 2020-08-26
Payer: COMMERCIAL

## 2020-08-26 PROCEDURE — 77385 HC IMRT TRMT DLVR SMPL: CPT

## 2020-08-27 ENCOUNTER — HOSPITAL ENCOUNTER (OUTPATIENT)
Dept: RADIATION THERAPY | Age: 60
Discharge: HOME | End: 2020-08-27
Payer: COMMERCIAL

## 2020-08-27 PROCEDURE — 77385 HC IMRT TRMT DLVR SMPL: CPT

## 2020-08-28 ENCOUNTER — HOSPITAL ENCOUNTER (OUTPATIENT)
Dept: RADIATION THERAPY | Age: 60
Discharge: HOME | End: 2020-08-28
Payer: COMMERCIAL

## 2020-08-28 PROCEDURE — 77385 HC IMRT TRMT DLVR SMPL: CPT

## 2020-08-31 ENCOUNTER — HOSPITAL ENCOUNTER (OUTPATIENT)
Dept: RADIATION THERAPY | Age: 60
Discharge: HOME | End: 2020-08-31
Payer: COMMERCIAL

## 2020-08-31 PROCEDURE — 77300 RADIATION THERAPY DOSE PLAN: CPT

## 2020-08-31 PROCEDURE — 77338 DESIGN MLC DEVICE FOR IMRT: CPT

## 2020-08-31 PROCEDURE — 77385 HC IMRT TRMT DLVR SMPL: CPT

## 2020-09-01 ENCOUNTER — HOSPITAL ENCOUNTER (OUTPATIENT)
Dept: RADIATION THERAPY | Age: 60
Discharge: HOME | End: 2020-09-01
Payer: COMMERCIAL

## 2020-09-01 PROCEDURE — 77385 HC IMRT TRMT DLVR SMPL: CPT

## 2020-09-02 ENCOUNTER — TELEPHONE (OUTPATIENT)
Dept: INTERNAL MEDICINE CLINIC | Age: 60
End: 2020-09-02

## 2020-09-02 ENCOUNTER — HOSPITAL ENCOUNTER (OUTPATIENT)
Dept: RADIATION THERAPY | Age: 60
Discharge: HOME | End: 2020-09-02
Payer: COMMERCIAL

## 2020-09-02 DIAGNOSIS — J34.89 SINUS PAIN: Primary | ICD-10-CM

## 2020-09-02 PROCEDURE — 77385 HC IMRT TRMT DLVR SMPL: CPT

## 2020-09-02 NOTE — TELEPHONE ENCOUNTER
Pt was seen yesterday at [facility] for buring sensation in his nose - he was told to see ENT, he is asking for a referral for one please advise

## 2020-09-03 ENCOUNTER — HOSPITAL ENCOUNTER (OUTPATIENT)
Dept: RADIATION THERAPY | Age: 60
Discharge: HOME | End: 2020-09-03
Payer: COMMERCIAL

## 2020-09-03 PROCEDURE — 77385 HC IMRT TRMT DLVR SMPL: CPT

## 2020-09-04 ENCOUNTER — HOSPITAL ENCOUNTER (OUTPATIENT)
Dept: RADIATION THERAPY | Age: 60
Discharge: HOME | End: 2020-09-04
Payer: COMMERCIAL

## 2020-09-04 PROCEDURE — 77336 RADIATION PHYSICS CONSULT: CPT

## 2020-09-04 PROCEDURE — 77385 HC IMRT TRMT DLVR SMPL: CPT

## 2020-09-08 ENCOUNTER — HOSPITAL ENCOUNTER (OUTPATIENT)
Dept: RADIATION THERAPY | Age: 60
Discharge: HOME | End: 2020-09-08
Payer: COMMERCIAL

## 2020-09-08 PROCEDURE — 77385 HC IMRT TRMT DLVR SMPL: CPT

## 2020-09-09 ENCOUNTER — HOSPITAL ENCOUNTER (OUTPATIENT)
Dept: RADIATION THERAPY | Age: 60
Discharge: HOME | End: 2020-09-09
Payer: COMMERCIAL

## 2020-09-09 ENCOUNTER — TELEPHONE (OUTPATIENT)
Dept: INTERNAL MEDICINE CLINIC | Age: 60
End: 2020-09-09

## 2020-09-09 PROCEDURE — 77385 HC IMRT TRMT DLVR SMPL: CPT

## 2020-09-09 NOTE — TELEPHONE ENCOUNTER
Pt states he was referred to Dr Dionne Jenkins office but they can't get him in [facility] 9/18/20. He said he can't wait this long. Wants appt ASAP with any ENT he can get in with.   Please advise him at [phone]

## 2020-09-09 NOTE — TELEPHONE ENCOUNTER
John Paul benitez into office said he was able to get a sooner appt w/ Dr Maria A Lockwood on 09/14- at 8:30am he needs a referral sent

## 2020-09-10 ENCOUNTER — HOSPITAL ENCOUNTER (OUTPATIENT)
Dept: RADIATION THERAPY | Age: 60
Discharge: HOME | End: 2020-09-10
Payer: COMMERCIAL

## 2020-09-10 PROCEDURE — 77385 HC IMRT TRMT DLVR SMPL: CPT

## 2020-09-11 ENCOUNTER — HOSPITAL ENCOUNTER (OUTPATIENT)
Dept: RADIATION THERAPY | Age: 60
Discharge: HOME | End: 2020-09-11
Payer: COMMERCIAL

## 2020-09-11 PROCEDURE — 77385 HC IMRT TRMT DLVR SMPL: CPT

## 2020-09-14 ENCOUNTER — HOSPITAL ENCOUNTER (OUTPATIENT)
Dept: RADIATION THERAPY | Age: 60
Discharge: HOME | End: 2020-09-14
Payer: COMMERCIAL

## 2020-09-14 PROCEDURE — 77385 HC IMRT TRMT DLVR SMPL: CPT

## 2020-09-14 PROCEDURE — 77336 RADIATION PHYSICS CONSULT: CPT

## 2020-09-15 ENCOUNTER — HOSPITAL ENCOUNTER (OUTPATIENT)
Dept: RADIATION THERAPY | Age: 60
Discharge: HOME | End: 2020-09-15
Payer: COMMERCIAL

## 2020-09-15 PROCEDURE — 77385 HC IMRT TRMT DLVR SMPL: CPT

## 2020-09-16 ENCOUNTER — HOSPITAL ENCOUNTER (OUTPATIENT)
Dept: RADIATION THERAPY | Age: 60
Discharge: HOME | End: 2020-09-16
Payer: COMMERCIAL

## 2020-09-16 PROCEDURE — 77385 HC IMRT TRMT DLVR SMPL: CPT

## 2020-09-17 ENCOUNTER — HOSPITAL ENCOUNTER (OUTPATIENT)
Dept: RADIATION THERAPY | Age: 60
Discharge: HOME | End: 2020-09-17
Payer: COMMERCIAL

## 2020-09-17 PROCEDURE — 77385 HC IMRT TRMT DLVR SMPL: CPT

## 2020-09-18 ENCOUNTER — HOSPITAL ENCOUNTER (OUTPATIENT)
Dept: RADIATION THERAPY | Age: 60
Discharge: HOME | End: 2020-09-18
Payer: COMMERCIAL

## 2020-09-18 PROCEDURE — 77385 HC IMRT TRMT DLVR SMPL: CPT

## 2020-09-21 ENCOUNTER — HOSPITAL ENCOUNTER (OUTPATIENT)
Dept: RADIATION THERAPY | Age: 60
Discharge: HOME | End: 2020-09-21
Payer: COMMERCIAL

## 2020-09-21 PROCEDURE — 77336 RADIATION PHYSICS CONSULT: CPT

## 2020-09-21 PROCEDURE — 77385 HC IMRT TRMT DLVR SMPL: CPT

## 2020-09-22 ENCOUNTER — HOSPITAL ENCOUNTER (OUTPATIENT)
Dept: RADIATION THERAPY | Age: 60
Discharge: HOME | End: 2020-09-22
Payer: COMMERCIAL

## 2020-09-22 PROCEDURE — 77385 HC IMRT TRMT DLVR SMPL: CPT

## 2020-09-23 ENCOUNTER — HOSPITAL ENCOUNTER (OUTPATIENT)
Dept: RADIATION THERAPY | Age: 60
Discharge: HOME | End: 2020-09-23
Payer: COMMERCIAL

## 2020-09-23 PROCEDURE — 77385 HC IMRT TRMT DLVR SMPL: CPT

## 2020-09-24 ENCOUNTER — HOSPITAL ENCOUNTER (OUTPATIENT)
Dept: RADIATION THERAPY | Age: 60
Discharge: HOME | End: 2020-09-24
Payer: COMMERCIAL

## 2020-09-24 PROCEDURE — 77385 HC IMRT TRMT DLVR SMPL: CPT

## 2020-09-25 ENCOUNTER — HOSPITAL ENCOUNTER (OUTPATIENT)
Dept: RADIATION THERAPY | Age: 60
Discharge: HOME | End: 2020-09-25
Payer: COMMERCIAL

## 2020-09-25 PROCEDURE — 77385 HC IMRT TRMT DLVR SMPL: CPT

## 2020-09-28 ENCOUNTER — HOSPITAL ENCOUNTER (OUTPATIENT)
Dept: RADIATION THERAPY | Age: 60
Discharge: HOME | End: 2020-09-28
Payer: COMMERCIAL

## 2020-09-28 PROCEDURE — 77336 RADIATION PHYSICS CONSULT: CPT

## 2020-09-28 PROCEDURE — 77385 HC IMRT TRMT DLVR SMPL: CPT

## 2020-09-29 ENCOUNTER — HOSPITAL ENCOUNTER (OUTPATIENT)
Dept: RADIATION THERAPY | Age: 60
Discharge: HOME | End: 2020-09-29
Payer: COMMERCIAL

## 2020-09-29 PROCEDURE — 77385 HC IMRT TRMT DLVR SMPL: CPT

## 2020-09-30 ENCOUNTER — HOSPITAL ENCOUNTER (OUTPATIENT)
Dept: RADIATION THERAPY | Age: 60
Discharge: HOME | End: 2020-09-30
Payer: COMMERCIAL

## 2020-09-30 PROCEDURE — 77385 HC IMRT TRMT DLVR SMPL: CPT

## 2020-10-01 ENCOUNTER — HOSPITAL ENCOUNTER (OUTPATIENT)
Dept: RADIATION THERAPY | Age: 60
Discharge: HOME | End: 2020-10-01
Payer: COMMERCIAL

## 2020-10-01 PROCEDURE — 77385 HC IMRT TRMT DLVR SMPL: CPT

## 2020-10-02 ENCOUNTER — HOSPITAL ENCOUNTER (OUTPATIENT)
Dept: RADIATION THERAPY | Age: 60
Discharge: HOME | End: 2020-10-02
Payer: COMMERCIAL

## 2020-10-02 PROCEDURE — 77385 HC IMRT TRMT DLVR SMPL: CPT

## 2020-10-05 ENCOUNTER — HOSPITAL ENCOUNTER (OUTPATIENT)
Dept: RADIATION THERAPY | Age: 60
Discharge: HOME | End: 2020-10-05
Payer: COMMERCIAL

## 2020-10-05 PROCEDURE — 77336 RADIATION PHYSICS CONSULT: CPT

## 2020-10-05 PROCEDURE — 77385 HC IMRT TRMT DLVR SMPL: CPT

## 2020-10-06 ENCOUNTER — HOSPITAL ENCOUNTER (OUTPATIENT)
Dept: RADIATION THERAPY | Age: 60
Discharge: HOME | End: 2020-10-06
Payer: COMMERCIAL

## 2020-10-06 PROCEDURE — 77385 HC IMRT TRMT DLVR SMPL: CPT

## 2020-10-07 ENCOUNTER — HOSPITAL ENCOUNTER (OUTPATIENT)
Dept: RADIATION THERAPY | Age: 60
Discharge: HOME | End: 2020-10-07
Payer: COMMERCIAL

## 2020-10-07 PROCEDURE — 77385 HC IMRT TRMT DLVR SMPL: CPT

## 2020-10-08 ENCOUNTER — HOSPITAL ENCOUNTER (OUTPATIENT)
Dept: RADIATION THERAPY | Age: 60
Discharge: HOME | End: 2020-10-08
Payer: COMMERCIAL

## 2020-10-08 PROCEDURE — 77385 HC IMRT TRMT DLVR SMPL: CPT

## 2020-10-09 ENCOUNTER — HOSPITAL ENCOUNTER (OUTPATIENT)
Dept: RADIATION THERAPY | Age: 60
Discharge: HOME | End: 2020-10-09
Payer: COMMERCIAL

## 2020-10-09 PROCEDURE — 77385 HC IMRT TRMT DLVR SMPL: CPT

## 2020-10-12 ENCOUNTER — HOSPITAL ENCOUNTER (OUTPATIENT)
Dept: RADIATION THERAPY | Age: 60
Discharge: HOME | End: 2020-10-12
Payer: COMMERCIAL

## 2020-10-12 PROCEDURE — 77385 HC IMRT TRMT DLVR SMPL: CPT

## 2020-10-12 PROCEDURE — 77336 RADIATION PHYSICS CONSULT: CPT

## 2020-10-13 ENCOUNTER — HOSPITAL ENCOUNTER (OUTPATIENT)
Dept: RADIATION THERAPY | Age: 60
Discharge: HOME | End: 2020-10-13
Payer: COMMERCIAL

## 2020-10-13 PROCEDURE — 77385 HC IMRT TRMT DLVR SMPL: CPT

## 2020-10-14 ENCOUNTER — HOSPITAL ENCOUNTER (OUTPATIENT)
Dept: RADIATION THERAPY | Age: 60
Discharge: HOME | End: 2020-10-14
Payer: COMMERCIAL

## 2020-10-14 PROCEDURE — 77385 HC IMRT TRMT DLVR SMPL: CPT

## 2020-10-15 ENCOUNTER — HOSPITAL ENCOUNTER (OUTPATIENT)
Dept: RADIATION THERAPY | Age: 60
Discharge: HOME | End: 2020-10-15
Payer: COMMERCIAL

## 2020-10-15 PROCEDURE — 77385 HC IMRT TRMT DLVR SMPL: CPT

## 2020-10-16 ENCOUNTER — HOSPITAL ENCOUNTER (OUTPATIENT)
Dept: RADIATION THERAPY | Age: 60
Discharge: HOME | End: 2020-10-16
Payer: COMMERCIAL

## 2020-10-16 PROCEDURE — 77385 HC IMRT TRMT DLVR SMPL: CPT

## 2020-10-16 PROCEDURE — 77336 RADIATION PHYSICS CONSULT: CPT

## 2020-11-04 DIAGNOSIS — B00.9 HSV INFECTION: ICD-10-CM

## 2020-11-06 RX ORDER — VALACYCLOVIR HYDROCHLORIDE 1 G/1
TABLET, FILM COATED ORAL
Qty: 90 TAB | Refills: 3 | Status: SHIPPED | OUTPATIENT
Start: 2020-11-06 | End: 2022-04-29

## 2020-12-23 LAB — SARS-COV-2, NAA: NOT DETECTED

## 2021-02-07 NOTE — PROGRESS NOTES
64 y.o. BLACK OR  male who presents for f/u    He was dx'ed with prostate ca and eventually underwent ext beam radiation per Dr Ibrahima Da Silva. It's taken 'his drive and sexual function away' so rerquesting cialis refill    Denies any cardiovascular complaints. Exercise, he is active with work. Blood pressures been doing okay when he checks    Denied any gi complaints    Doing better with the diet and he's down to 1-2 meals a day although still not much weight loss as below. Not checking his sugars much.   Weight stable as below    Vitals 2/12/2021 8/7/2020   Weight 268 lb 268 lb     IF 4/18    Past Medical History:   Diagnosis Date    Allergic rhinitis     BPH (benign prostatic hyperplasia)     Colon polyp 07/15/2020    Dr Amandeep Mitchell    Deviated septum and epistaxis Dr. Charla Zabala 2009     Diabetes mellitus [facility])     on basis of elev a1c 12/15    ED (erectile dysfunction)     Fatty liver     on US 6/10; Fib-4 was 0.91 from 5/12    H. pylori infection     EGD w dilation Dr Navjot Subramanian 3/15    HSV (herpes simplex virus) infection     Hyperlipidemia     Hypertension     IFG (impaired fasting glucose) 03/2010    2 hr GTT nl     Obesity     peak 292 lbs, bmi 37.8 from 2/14; IF 4/18 start weight 280 lbs    Prolactinoma ([facility])     Dr. Laurie Anderson Prostate cancer [facility]) 02/2019    Dr Ernie Kirk bx 6/12+, psa 4.4, Chandu 3+3; 2nd opinion Dr Hitesh Johnson; now seeing Dr Arie Kohli; s/p ext beam radiation Dr Nahun Mays Sickle cell anemia [facility])      Past Surgical History:   Procedure Laterality Date   Idalmis Sylvie Christianson 6/12 neg; Dr Amandeep Mitchell 7/15/20 polyp    HX CYSTECTOMY      HX GI  2/01    negative barium enema    HX ORTHOPAEDIC  11/2015    medial meniscus tear left knee  Dearl Sanna  03/2018    CT head neg     Social History     Socioeconomic History    Marital status:      Spouse name: Not on file    Number of children: 2    Years of education: Not on file   Gurvinder Michael Highest education level: Not on file   Occupational History    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   Social Needs    Financial resource strain: Not on file    Food insecurity     Worry: Not on file     Inability: Not on file   [employer] needs     Medical: Not on file     Non-medical: Not on file   Tobacco Use    Smoking status: Never Smoker    Smokeless tobacco: Never Used   Substance and Sexual Activity    Alcohol use: No    Drug use: No    Sexual activity: Yes   Lifestyle    Physical activity     Days per week: Not on file     Minutes per session: Not on file    Stress: Not on file   Relationships    Social connections     Talks on phone: Not on file     Gets together: Not on file     Attends Muslim service: Not on file     Active member of club or organization: Not on file     Attends meetings of clubs or organizations: Not on file     Relationship status: Not on file    Intimate partner violence     Fear of current or ex partner: Not on file     Emotionally abused: Not on file     Physically abused: Not on file     Forced sexual activity: Not on file   Other Topics Concern    Not on file   Social History Narrative    Not on file     Allergies   Allergen Reactions    Hydrochlorothiazide Other (comments)     Weight loss, pt unsure of allergy      Metformin Other (comments)     paresthesia     Current Outpatient Medications   Medication Sig    tadalafiL (CIALIS) 20 mg tablet Take 1 Tab by mouth daily as needed for Erectile Dysfunction. Indications: the inability to have an erection    valACYclovir (VALTREX) 1 gram tablet take 1 tablet by mouth once daily    benazepriL (LOTENSIN) 20 mg tablet Take 1 Tab by mouth daily.  atorvastatin (LIPITOR) 80 mg tablet take 1 tablet by mouth once daily    fluticasone propionate (Flonase Allergy Relief) 50 mcg/actuation nasal spray 2 Sprays by Both Nostrils route daily as needed for Rhinitis or Allergies.     sodium chloride (Saline Nasal Mist) 0.65 % nasal squeeze bottle 0.05 mL by Both Nostrils route as needed for Congestion.   • SITagliptin (JANUVIA) 100 mg tablet Take 1 Tab by mouth daily.   • ezetimibe (ZETIA) 10 mg tablet take 1 tablet by mouth once daily   • multivitamin (ONE A DAY) tablet Take 1 Tab by mouth daily.   • OTHER Vitamin A supplement   • glucose blood VI test strips (ONETOUCH VERIO) strip Patient checks blood sugar daily, Dx E11.9   • glucose blood VI test strips (ONETOUCH ULTRA TEST) strip Patient to test Blood sugar 1 a day DX: E11.65     No current facility-administered medications for this visit.      REVIEW OF SYSTEMS: colo 6/12 Dr Yeager  Ophtho - no vision change or eye pain  Oral - no mouth pain, tongue or tooth problems  Ears - no hearing loss, ear pain, fullness, no swallowing problems  Cardiac - no CP, PND, orthopnea, edema, palpitations or syncope  Chest - no breast masses  Resp - no wheezing, chronic coughing, dyspnea  GI - no heartburn, nausea, vomiting, change in bowel habits, bleeding, hemorrhoids  Urinary - no dysuria, hematuria, flank pain, urgency, frequency    Visit Vitals  /80   Pulse 75   Temp 97.3 °F (36.3 °C) (Temporal)   Resp 14   Ht 6' 3\" (1.905 m)   Wt 268 lb (121.6 kg)   SpO2 95%   BMI 33.50 kg/m²      A&O x3  Affect is appropriate.  Mood stable  No apparent distress  Anicteric, no JVD, adenopathy or thyromegaly.   No carotid bruits or radiated murmur  Lungs clear to auscultation, no wheezes or rales  Heart showed regular rate and rhythm. No murmur, rubs, gallops  Abdomen soft nontender, no hepatosplenomegaly or masses.   Extremities without edema.  Pulses 1-2+ symmetrically    LABS  From 2/10 showed    gluc 111, cr 1.10, gfr>60, alt 51, hba1c 6.0,                   chol 278, tg 122,  hdl 56, ldl-c 198, wbc 3.2, hb 13.8, plt 230, psa 0.60  From 3/10 showed                                                2hr GTT 88  From 6/10 showed    gluc 105, cr 1.30, gfr>60, alt 59,                              wbc 3.6, hb  13.9, plt 237  From 10/11 showed  gluc 102, cr 1.18, gfr 82,  alt 22, hba1c 6.5,                             wbc 3.5, hb 13.5, plt 255, psa 1.20, prl 30.8  From 11/11 showed                ldl-p 1245,                                 2 hr GTT 89  From 2/12 showed                                         ua neg  From 5/12 showed    gluc 109, cr 1.10, gfr>60, alt 17, hba1c 6.1,                   chol 224, tg 70,   hdl 70, ldl-c 140, wbc 3.8, hb 14.3, plt 250, psa 1.08  From 10/12 showed  gluc 98,   cr 1.00, gfr>60, alt 23, hba1c 6.2, ldl-p 1647, chol 216, tg 64,   hdl 63, ldl-c 148  From 1/14 showed    gluc 99,   cr 1.30, gfr 58,  alt 31, hba1c 6.4,      chol 196, tg 81,   hdl 67, ldl-c 113  From 8/14 showed    gluc 100, cr 1.20, gfr>60, alt 19, hba1c 6.2, umar neg   chol 288, tg 140, hdl 69, ldl-c 191         From 9/14 showed                       tsh 1.84  From 2/15 showed    gluc 118, cr 1.34, gfr 68,  alt 19, hba1c 6.4, umar neg   chol 301, tg 190, hdl 63, ldl-c 200,     From 8/15 showed         hba1c 6.3,      chol 267, tg 90,   hdl 70, ldl-c 179  From 12/15 showed  gluc 102, cr 1.29, gfr>60,     hba1c 6.8, umar neg  From 3/16 showed    gluc 96,   cr 1.23, gfr>60,                   wbc 4.1, hb 13.3, plt 240  From 10/16 showed  gluc 110, cr 1.13, gfr>60, alt 29, hba1c 6.4,      chol 233, tg 120, hdl 73, ldl-c 136  From 9/17 showed    gluc 99,   cr 1.25, gfr>60, alt 29, hba1c 6.5, umar neg,  chol 240, tg 93,   hdl 79, ldl-c 142, wbc 3.9, hb 14.4, plt 217, psa 3.80  From 3/18 showed    gluc 103, cr 1.30, gfr>60, alt 27, hba1c 6.3,      chol 251, tg 96,   hdl 88, ldl-c 144, tsh 2.69,                  psa 3.70, b12 559, fol 14.1, free t4 1.20, hiv neg, rpr neg, hep b/c neg  From 1/19 showed                           umar neg  chol 156, tg 47,   hdl 99, ldl-c 48,           From 2/19 showed                    hba1c 7.0,                              hep b/c neg, hiv neg, tpall neg  From 7/19 showed    gluc 103, cr 1.38, gfr>60  From 2/20 showed    gluc 92,   cr 1.22, gfr>60, alt 30, hba1c 6.3, umar neg, chol 160, tg 42,   hdl 91, ldl-c 61,   wbc 3.8, hb 13.7, plt 217  From 3/20 showed   gluc 90,    cr 1.30, gfr>60,                psa 3.61 ferritin 283, b12 477, fol 11.9  From 8/20 showed   gluc 91,    cr 1.29, gfr>60,                psa 4.80    We reviewed the patient's labs from the last several visits to point out trends in the numbers            Patient Active Problem List   Diagnosis Code    Prolactin microadenoma Dr. Natalie Kang D35.2    Hyperlipidemia E78.5    Essential hypertension I10    Diabetes mellitus type 2, uncontrolled ([facility] E11.65    Erectile dysfunction N52.9    Severe obesity (BMI 35.0-39. 9) with comorbidity ([facility] E66.01    Prostate cancer ([facility] C61     Assessment and plan:  1. Ortho. F/U Dr Jose Reardon  2. Obesity. Lifestyle and dietary measures, previously declined appetite supp. Trying to stick with IF  3. DM. Continue current regimen, he will get labs done  4. Erectile dysfunction. Prn cialis refilled  5. Prolactinoma. Continue dostinex  6. HLP. Continue current regimen. 7.  HTN. Continue current regimen. 8.  Prostate ca. Per Dr Татьяна Gutierrez      RTC 8/21    Above conditions discussed at length and patient vocalized understanding.   All questions answered to patient satisfaction

## 2021-02-12 ENCOUNTER — OFFICE VISIT (OUTPATIENT)
Dept: INTERNAL MEDICINE CLINIC | Age: 61
End: 2021-02-12
Payer: COMMERCIAL

## 2021-02-12 VITALS
HEART RATE: 75 BPM | HEIGHT: 75 IN | RESPIRATION RATE: 14 BRPM | WEIGHT: 268 LBS | OXYGEN SATURATION: 95 % | DIASTOLIC BLOOD PRESSURE: 80 MMHG | TEMPERATURE: 97.3 F | SYSTOLIC BLOOD PRESSURE: 118 MMHG | BODY MASS INDEX: 33.32 KG/M2

## 2021-02-12 DIAGNOSIS — I10 ESSENTIAL HYPERTENSION: ICD-10-CM

## 2021-02-12 DIAGNOSIS — C61 PROSTATE CANCER (HCC): ICD-10-CM

## 2021-02-12 DIAGNOSIS — E66.01 SEVERE OBESITY (BMI 35.0-39.9) WITH COMORBIDITY (HCC): ICD-10-CM

## 2021-02-12 DIAGNOSIS — N52.9 ERECTILE DYSFUNCTION, UNSPECIFIED ERECTILE DYSFUNCTION TYPE: ICD-10-CM

## 2021-02-12 DIAGNOSIS — E78.5 HYPERLIPIDEMIA, UNSPECIFIED HYPERLIPIDEMIA TYPE: Primary | ICD-10-CM

## 2021-02-12 DIAGNOSIS — D35.2 PROLACTINOMA (HCC): ICD-10-CM

## 2021-02-12 DIAGNOSIS — E11.65 UNCONTROLLED TYPE 2 DIABETES MELLITUS WITH HYPERGLYCEMIA (HCC): ICD-10-CM

## 2021-02-12 PROBLEM — R35.1 NOCTURIA: Status: RESOLVED | Noted: 2019-08-20 | Resolved: 2021-02-12

## 2021-02-12 PROCEDURE — 99214 OFFICE O/P EST MOD 30 MIN: CPT | Performed by: INTERNAL MEDICINE

## 2021-02-12 RX ORDER — TADALAFIL 20 MG/1
20 TABLET ORAL
Qty: 30 TAB | Refills: 11 | Status: SHIPPED | OUTPATIENT
Start: 2021-02-12

## 2021-02-12 NOTE — PROGRESS NOTES
Johnathan Hylton presents today for   Chief Complaint   Patient presents with    Cholesterol Problem     f/u              Depression Screening:  3 most recent PHQ Screens 2/12/2021   Little interest or pleasure in doing things Not at all   Feeling down, depressed, irritable, or hopeless Not at all   Total Score PHQ 2 0       Learning Assessment:  Learning Assessment 7/17/2019   PRIMARY LEARNER Patient   HIGHEST LEVEL OF EDUCATION - PRIMARY LEARNER  GRADUATED HIGH SCHOOL OR GED   BARRIERS PRIMARY LEARNER NONE   CO-LEARNER CAREGIVER No   PRIMARY LANGUAGE ENGLISH   LEARNER PREFERENCE PRIMARY DEMONSTRATION     READING     LISTENING     PICTURES     VIDEOS     DEMONSTRATION   ANSWERED BY Taj Bullock   RELATIONSHIP SELF       Abuse Screening:  Abuse Screening Questionnaire 7/17/2019   Do you ever feel afraid of your partner? N   Are you in a relationship with someone who physically or mentally threatens you? N   Is it safe for you to go home? Y       Fall Risk  No flowsheet data found. Coordination of Care:  1. Have you been to the ER, urgent care clinic since your last visit? Hospitalized since your last visit? no    2. Have you seen or consulted any other health care providers outside of the [address] [place] [address] since your last visit? Include any pap smears or colon screening.  no

## 2021-02-15 ENCOUNTER — HOSPITAL ENCOUNTER (OUTPATIENT)
Dept: LAB | Age: 61
Discharge: HOME | End: 2021-02-15
Payer: COMMERCIAL

## 2021-02-15 DIAGNOSIS — E11.65 UNCONTROLLED TYPE 2 DIABETES MELLITUS WITH HYPERGLYCEMIA (HCC): ICD-10-CM

## 2021-02-15 LAB
ALBUMIN SERPL-MCNC: 3.9 G/DL (ref 3.4–5)
ALBUMIN/GLOB SERPL: 1.1 {RATIO} (ref 0.8–1.7)
ALP SERPL-CCNC: 59 U/L (ref 45–117)
ALT SERPL-CCNC: 25 U/L (ref 16–61)
ANION GAP SERPL CALC-SCNC: 6 MMOL/L (ref 3–18)
AST SERPL-CCNC: 22 U/L (ref 10–38)
BILIRUB SERPL-MCNC: 0.6 MG/DL (ref 0.2–1)
BUN SERPL-MCNC: 18 MG/DL (ref 7–18)
BUN/CREAT SERPL: 17 (ref 12–20)
CALCIUM SERPL-MCNC: 8.9 MG/DL (ref 8.5–10.1)
CHLORIDE SERPL-SCNC: 105 MMOL/L (ref 100–111)
CHOLEST SERPL-MCNC: 183 MG/DL
CO2 SERPL-SCNC: 29 MMOL/L (ref 21–32)
CREAT SERPL-MCNC: 1.09 MG/DL (ref 0.6–1.3)
CREAT UR-MCNC: 151 MG/DL (ref 30–125)
ERYTHROCYTE [DISTWIDTH] IN BLOOD BY AUTOMATED COUNT: 13.7 % (ref 11.6–14.5)
GLOBULIN SER CALC-MCNC: 3.4 G/DL (ref 2–4)
GLUCOSE SERPL-MCNC: 91 MG/DL (ref 74–99)
HBA1C MFR BLD: 6.1 % (ref 4.2–5.6)
HCT VFR BLD AUTO: 39.4 % (ref 36–48)
HDLC SERPL-MCNC: 93 MG/DL (ref 40–60)
HDLC SERPL: 2 {RATIO} (ref 0–5)
HGB BLD-MCNC: 13.6 G/DL (ref 13–16)
LDLC SERPL CALC-MCNC: 80.2 MG/DL (ref 0–100)
LIPID PROFILE,FLP: ABNORMAL
MCH RBC QN AUTO: 28.2 PG (ref 24–34)
MCHC RBC AUTO-ENTMCNC: 34.5 G/DL (ref 31–37)
MCV RBC AUTO: 81.6 FL (ref 74–97)
MICROALBUMIN UR-MCNC: 0.8 MG/DL (ref 0–3)
MICROALBUMIN/CREAT UR-RTO: 5 MG/G (ref 0–30)
PLATELET # BLD AUTO: 230 K/UL (ref 135–420)
PMV BLD AUTO: 11 FL (ref 9.2–11.8)
POTASSIUM SERPL-SCNC: 4.3 MMOL/L (ref 3.5–5.5)
PROT SERPL-MCNC: 7.3 G/DL (ref 6.4–8.2)
RBC # BLD AUTO: 4.83 M/UL (ref 4.7–5.5)
SODIUM SERPL-SCNC: 140 MMOL/L (ref 136–145)
TRIGL SERPL-MCNC: 49 MG/DL (ref ?–150)
VLDLC SERPL CALC-MCNC: 9.8 MG/DL
WBC # BLD AUTO: 3.6 K/UL (ref 4.6–13.2)

## 2021-02-15 PROCEDURE — 82570 ASSAY OF URINE CREATININE: CPT

## 2021-02-15 PROCEDURE — 80061 LIPID PANEL: CPT

## 2021-02-15 PROCEDURE — 85027 COMPLETE CBC AUTOMATED: CPT

## 2021-02-15 PROCEDURE — 80053 COMPREHEN METABOLIC PANEL: CPT

## 2021-02-15 PROCEDURE — 83036 HEMOGLOBIN GLYCOSYLATED A1C: CPT

## 2021-02-21 DIAGNOSIS — E11.65 UNCONTROLLED TYPE 2 DIABETES MELLITUS WITH HYPERGLYCEMIA (HCC): ICD-10-CM

## 2021-02-21 RX ORDER — SITAGLIPTIN 100 MG/1
TABLET, FILM COATED ORAL
Qty: 30 TAB | Refills: 11 | Status: SHIPPED | OUTPATIENT
Start: 2021-02-21 | End: 2022-04-29

## 2021-03-02 ENCOUNTER — TELEPHONE (OUTPATIENT)
Dept: INTERNAL MEDICINE CLINIC | Age: 61
End: 2021-03-02

## 2021-03-04 NOTE — TELEPHONE ENCOUNTER
pls call      Results for orders placed or performed during the hospital encounter of 02/15/21   CBC W/O DIFF   Result Value Ref Range    WBC 3.6 (L) 4.6 - 13.2 K/uL    RBC 4.83 4.70 - 5.50 M/uL    HGB 13.6 13.0 - 16.0 g/dL    HCT 39.4 36.0 - 48.0 %    MCV 81.6 74.0 - 97.0 FL    MCH 28.2 24.0 - 34.0 PG    MCHC 34.5 31.0 - 37.0 g/dL    RDW 13.7 11.6 - 14.5 %    PLATELET 875 089 - 182 K/uL    MPV 11.0 9.2 - 11.8 FL   LIPID PANEL   Result Value Ref Range    LIPID PROFILE          Cholesterol, total 183 <200 MG/DL    Triglyceride 49 <150 MG/DL    HDL Cholesterol 93 (H) 40 - 60 MG/DL    LDL, calculated 80.2 0 - 100 MG/DL    VLDL, calculated 9.8 MG/DL    CHOL/HDL Ratio 2.0 0 - 5.0     METABOLIC PANEL, COMPREHENSIVE   Result Value Ref Range    Sodium 140 136 - 145 mmol/L    Potassium 4.3 3.5 - 5.5 mmol/L    Chloride 105 100 - 111 mmol/L    CO2 29 21 - 32 mmol/L    Anion gap 6 3.0 - 18 mmol/L    Glucose 91 74 - 99 mg/dL    BUN 18 7.0 - 18 MG/DL    Creatinine 1.09 0.6 - 1.3 MG/DL    BUN/Creatinine ratio 17 12 - 20      GFR est AA >60 >60 ml/min/1.73m2    GFR est non-AA >60 >60 ml/min/1.73m2    Calcium 8.9 8.5 - 10.1 MG/DL    Bilirubin, total 0.6 0.2 - 1.0 MG/DL    ALT (SGPT) 25 16 - 61 U/L    AST (SGOT) 22 10 - 38 U/L    Alk.  phosphatase 59 45 - 117 U/L    Protein, total 7.3 6.4 - 8.2 g/dL    Albumin 3.9 3.4 - 5.0 g/dL    Globulin 3.4 2.0 - 4.0 g/dL    A-G Ratio 1.1 0.8 - 1.7     MICROALBUMIN, UR, RAND W/ MICROALB/CREAT RATIO   Result Value Ref Range    Microalbumin,urine random 0.80 0 - 3.0 MG/DL    Creatinine, urine 151.00 (H) 30 - 125 mg/dL    Microalbumin/Creat ratio (mg/g creat) 5 0 - 30 mg/g   HEMOGLOBIN A1C W/O EAG   Result Value Ref Range    Hemoglobin A1c 6.1 (H) 4.2 - 5.6 %     All labs in great range  Apologies for not getting back to him

## 2021-03-22 ENCOUNTER — TELEPHONE (OUTPATIENT)
Dept: INTERNAL MEDICINE CLINIC | Age: 61
End: 2021-03-22

## 2021-03-22 DIAGNOSIS — G89.29 CHRONIC KNEE PAIN, UNSPECIFIED LATERALITY: Primary | ICD-10-CM

## 2021-03-22 DIAGNOSIS — M25.569 CHRONIC KNEE PAIN, UNSPECIFIED LATERALITY: Primary | ICD-10-CM

## 2021-04-06 ENCOUNTER — OFFICE VISIT (OUTPATIENT)
Dept: ORTHOPEDIC SURGERY | Age: 61
End: 2021-04-06
Payer: COMMERCIAL

## 2021-04-06 VITALS — HEIGHT: 75 IN | TEMPERATURE: 96.8 F | WEIGHT: 260.2 LBS | RESPIRATION RATE: 16 BRPM | BODY MASS INDEX: 32.35 KG/M2

## 2021-04-06 DIAGNOSIS — M17.11 PRIMARY OSTEOARTHRITIS OF RIGHT KNEE: ICD-10-CM

## 2021-04-06 DIAGNOSIS — M25.561 ACUTE PAIN OF RIGHT KNEE: Primary | ICD-10-CM

## 2021-04-06 PROCEDURE — 99204 OFFICE O/P NEW MOD 45 MIN: CPT | Performed by: ORTHOPAEDIC SURGERY

## 2021-04-06 PROCEDURE — 73564 X-RAY EXAM KNEE 4 OR MORE: CPT | Performed by: ORTHOPAEDIC SURGERY

## 2021-04-06 PROCEDURE — 20610 DRAIN/INJ JOINT/BURSA W/O US: CPT | Performed by: ORTHOPAEDIC SURGERY

## 2021-04-06 RX ORDER — BETAMETHASONE SODIUM PHOSPHATE AND BETAMETHASONE ACETATE 3; 3 MG/ML; MG/ML
6 INJECTION, SUSPENSION INTRA-ARTICULAR; INTRALESIONAL; INTRAMUSCULAR; SOFT TISSUE ONCE
Status: COMPLETED | OUTPATIENT
Start: 2021-04-06 | End: 2021-04-06

## 2021-04-06 RX ADMIN — BETAMETHASONE SODIUM PHOSPHATE AND BETAMETHASONE ACETATE 6 MG: 3; 3 INJECTION, SUSPENSION INTRA-ARTICULAR; INTRALESIONAL; INTRAMUSCULAR; SOFT TISSUE at 11:03

## 2021-04-06 NOTE — PROGRESS NOTES
Patient: Georgina Curtis                MRN: [id number]       SSN: [id number]  YOB: 1960        AGE: 64 y.o. SEX: male  Body mass index is 32.52 kg/m². PCP: Bari Talbot MD  04/06/21    Dear Ava Kent:    HISTORY:  Thank you so much for having me see Sue Vieyra. 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 It bothers him when he is sitting in a car/chair for a long time and goes to stand up or in the office. No true locking or giving way. It also causes some pain medially, occasionally a click and he reports he is stiff and occasionally burning as well. No hip pain. Back is reasonably quiescent. He has gone through radiation for prostate problems and apparently no spread. PHYSICAL EXAMINATION:  He looks healthy and well, appears his staged age. Hips rotate nicely. Both feet warm and well perfused. He is large statured. He is about 6'4\". The knee itself has an equivocal Gaurang's test with a possible small click, but not severely so. Quads are intact. He has about a fair bit of patellofemoral irritation with palpation and both feet warm and well perfused. No cyanosis, peripheral edema or clubbing. RADIOGRAPHS:  Review of x-rays today on 04/06/21, AP, tunnel, lateral and skyline of the right knee confirm moderate arthritis, especially patellofemoral.      PLAN:  I have explained to him that he has a combination of moderate arthritis of the knee with the possibility of meniscal tear and we can approach it a couple different ways. Not all meniscal tears need surgery and we can try just treating the arthritis and see how he gets along versus getting an MRI right away.   He would like to try with the injection first.  I think that is a very reasonable option and therefore discussion was had regarding surgery today and it was recommended that it is not recommended yet, we should pursue nonoperative measures first. Therefore the right knee was injected, well tolerated. Return to see us in about three to four weeks time. If not pleased with the injection, we will proceed with MRI to look for meniscal tear. Ishan Norton MD        REVIEW OF SYSTEMS:      CON: negative  EYE: negative   ENT: negative  RESP: negative  GI:    negative   :  negative  MSK: Positive  A twelve point review of systems was completed, positives noted and all other systems were reviewed and are negative          Past Medical History:   Diagnosis Date    Allergic rhinitis     BPH (benign prostatic hyperplasia)     Colon polyp 07/15/2020    Dr Miguelina Schmidt    Deviated septum and epistaxis Dr. Amelia Roger 2009     Diabetes mellitus ([facility] [address]     on basis of elev a1c 12/15    ED (erectile dysfunction)     Fatty liver     on US 6/10; Fib-4 was 0.91 from 5/12    H. pylori infection     EGD w dilation Dr Patrica Cunningham 3/15    HSV (herpes simplex virus) infection     Hyperlipidemia     Hypertension     IFG (impaired fasting glucose) 03/2010    2 hr GTT nl     Obesity     peak 292 lbs, bmi 37.8 from 2/14; IF 4/18 start weight 280 lbs    Prolactinoma (HCC)     Dr. Allen Busby Prostate cancer [facility]) 02/2019    Dr Chip Carr bx 6/12+, psa 4.4, Chandu 3+3; 2nd opinion Dr Cirilo Canela; now seeing Dr Raquel Benitez; s/p ext beam radiation Dr Alina Finney Sickle cell anemia [facility])        Family History   Problem Relation Age of Onset    Hypertension Mother     Hypertension Father     Stroke Father     Diabetes Sister     Cancer Brother     Prostate Cancer Brother        Current Outpatient Medications   Medication Sig Dispense Refill    Januvia 100 mg tablet take 1 tablet by mouth once daily 30 Tab 11    tadalafiL (CIALIS) 20 mg tablet Take 1 Tab by mouth daily as needed for Erectile Dysfunction.  Indications: the inability to have an erection 30 Tab 11    valACYclovir (VALTREX) 1 gram tablet take 1 tablet by mouth once daily 90 Tab 3    benazepriL (LOTENSIN) 20 mg tablet Take 1 Tab by mouth daily. 90 Tab 3    atorvastatin (LIPITOR) 80 mg tablet take 1 tablet by mouth once daily 90 Tab 3    fluticasone propionate (Flonase Allergy Relief) 50 mcg/actuation nasal spray 2 Sprays by Both Nostrils route daily as needed for Rhinitis or Allergies. 1 Bottle 0    sodium chloride (Saline Nasal Mist) 0.65 % nasal squeeze bottle 0.05 mL by Both Nostrils route as needed for Congestion. 30 mL 0    ezetimibe (ZETIA) 10 mg tablet take 1 tablet by mouth once daily 30 Tab 11    multivitamin (ONE A DAY) tablet Take 1 Tab by mouth daily.       OTHER Vitamin A supplement      glucose blood VI test strips (ONETOUCH VERIO) strip Patient checks blood sugar daily, Dx E11.9 100 Strip 3    glucose blood VI test strips (ONETOUCH ULTRA TEST) strip Patient to test Blood sugar 1 a day DX: E11.65 100 Strip 3       Allergies   Allergen Reactions    Hydrochlorothiazide Other (comments)     Weight loss, pt unsure of allergy      Metformin Other (comments)     paresthesia       Past Surgical History:   Procedure Laterality Date    HX COLONOSCOPY      Dr. Naomi Stark 6/12 neg; Dr Gina Mansfield 7/15/20 polyp    HX CYSTECTOMY      HX GI  2/01    negative barium enema    HX ORTHOPAEDIC  11/2015    medial meniscus tear left knee Dr Mckinnon Portal  03/2018    CT head neg       Social History     Socioeconomic History    Marital status:      Spouse name: Not on file    Number of children: 2    Years of education: Not on file    Highest education level: Not on file   Occupational History    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   Social Needs    Financial resource strain: Not on file    Food insecurity     Worry: Not on file     Inability: Not on file   [employer] needs     Medical: Not on file     Non-medical: Not on file   Tobacco Use    Smoking status: Never Smoker    Smokeless tobacco: Never Used   Substance and Sexual Activity    Alcohol use: No    Drug use: No    Sexual activity: Yes   Lifestyle    Physical activity     Days per week: Not on file     Minutes per session: Not on file    Stress: Not on file   Relationships    Social connections     Talks on phone: Not on file     Gets together: Not on file     Attends Episcopalian service: Not on file     Active member of club or organization: Not on file     Attends meetings of clubs or organizations: Not on file     Relationship status: Not on file    Intimate partner violence     Fear of current or ex partner: Not on file     Emotionally abused: Not on file     Physically abused: Not on file     Forced sexual activity: Not on file   Other Topics Concern    Not on file   Social History Narrative    Not on file       Visit Vitals  Temp 96.8 °F (36 °C) (Temporal)   Resp 16   Ht 6' 3\" (1.905 m)   Wt 118 kg (260 lb 3.2 oz)   BMI 32.52 kg/m²         PHYSICAL EXAMINATION:  GENERAL: Alert and oriented x3, in no acute distress, well-developed, well-nourished, afebrile. HEART: No JVD. EYES: No scleral icterus   NECK: No significant lymphadenopathy   LUNGS: No respiratory compromise or indrawing  ABDOMEN: Soft, non-tender, non-distended. Electronically signed by: MD ASH Pace Elvera Brodie Anitra Lacks, M.D., have reviewed the history, physical, and have updated the allergic reactions for [facility].     TIME OUT performed immediately prior to the start of procedure:  Lionel ALEMAN M.D., have performed the following reviews on [facility] prior to the start of the procedure:    - Patient was identified by name and date of birth  - Agreement on procedure being performed was verified  - Risks and benefits explained to the patient  - Patient was positioned for comfort  - Consent was signed and verified  - Patient was advised regarding risks of bruising, bleeding, infection and pain    Time: 10:59 AM     Body Part: intra-articular injection of right knee    Medication and Dose: 1mL Celestone preparation, i.e. 6 mg, and 3 mL 1% lidocaine    Date of Procedure: 04/06/21    PROCEDURE PERFORMED BY : Bang Slaughter M.D., [facility])    Provider Assisted by: Donnal Primrose    Patient assisted by: self    Patient tolerated procedure well with no complications

## 2021-04-30 ENCOUNTER — OFFICE VISIT (OUTPATIENT)
Dept: ORTHOPEDIC SURGERY | Age: 61
End: 2021-04-30
Payer: COMMERCIAL

## 2021-04-30 VITALS
BODY MASS INDEX: 32.7 KG/M2 | WEIGHT: 263 LBS | OXYGEN SATURATION: 99 % | HEIGHT: 75 IN | HEART RATE: 87 BPM | RESPIRATION RATE: 16 BRPM

## 2021-04-30 DIAGNOSIS — M25.561 ACUTE PAIN OF RIGHT KNEE: Primary | ICD-10-CM

## 2021-04-30 PROCEDURE — 99214 OFFICE O/P EST MOD 30 MIN: CPT | Performed by: ORTHOPAEDIC SURGERY

## 2021-04-30 NOTE — PROGRESS NOTES
Patient: Giana Petty                MRN: [id number]       SSN: [id number]  YOB: 1960        AGE: 64 y.o. SEX: male  Body mass index is 32.87 kg/m².     PCP: Jamil Joseph MD  04/30/21  Mattie Olmos returns follow-up right knee pain the cortisone injection removed about half of his pain still gets a little stiff if he is driving in the car for a long period of time not a lot of mechanical symptoms in terms of catching locking giving way but sometimes if he twists the knee can be a little uncomfortable for him    He denies fevers or chills otherwise has been feeling well in the examination today he is a large statured gentleman about 6 4 and just a minimal antalgic component to the gait but is mildly antalgic positive movie theater sign with his gait pattern tends to smooth off is in slight varus the hips rotate nicely both feet warm and well perfused no cyanosis peripheral edema clubbing Gaurang's test is mildly tender but nonclicking but he distinctly points to the posterior medial aspect of the knee right on the joint line I did review his previous x-rays with moderate arthritis    I would recommend an MRI I think he has a degenerative meniscal tear without a lot of mechanical symptoms but still with some discomfort and I did explain the findings to him there was a discussion regarding surgery and I would only recommend surgery if he has a significant cartilage tear there therefore MRI is indicated and will see him back afterwards      REVIEW OF SYSTEMS:      CON: negative  EYE: negative   ENT: negative  RESP: negative  GI:    negative   :  negative  MSK: Positive  A twelve point review of systems was completed, positives noted and all other systems were reviewed and are negative          Past Medical History:   Diagnosis Date    Allergic rhinitis     BPH (benign prostatic hyperplasia)     Colon polyp 07/15/2020    Dr Soo Lackey    Deviated septum and epistaxis Dr. Jimbo Marrero 2009    [place]en Diabetes mellitus ([facility] [address]     on basis of elev a1c 12/15    ED (erectile dysfunction)     Fatty liver     on US 6/10; Fib-4 was 0.91 from 5/12    H. pylori infection     EGD w dilation Dr Favio Lockwood 3/15    HSV (herpes simplex virus) infection     Hyperlipidemia     Hypertension     IFG (impaired fasting glucose) 03/2010    2 hr GTT nl     Obesity     peak 292 lbs, bmi 37.8 from 2/14; IF 4/18 start weight 280 lbs    Prolactinoma (HCC)     Dr. Priscilla Amanda Prostate cancer [facility]) 02/2019    Dr Toño Shipley bx 6/12+, psa 4.4, Chandu 3+3; 2nd opinion Dr Celine Lynch; now seeing Dr Darin Bateman; s/p ext beam radiation Dr Alan Pierre Sickle cell anemia [facility])        Family History   Problem Relation Age of Onset    Hypertension Mother     Hypertension Father     Stroke Father     Diabetes Sister     Cancer Brother     Prostate Cancer Brother        Current Outpatient Medications   Medication Sig Dispense Refill    Januvia 100 mg tablet take 1 tablet by mouth once daily 30 Tab 11    tadalafiL (CIALIS) 20 mg tablet Take 1 Tab by mouth daily as needed for Erectile Dysfunction. Indications: the inability to have an erection 30 Tab 11    valACYclovir (VALTREX) 1 gram tablet take 1 tablet by mouth once daily 90 Tab 3    benazepriL (LOTENSIN) 20 mg tablet Take 1 Tab by mouth daily. 90 Tab 3    atorvastatin (LIPITOR) 80 mg tablet take 1 tablet by mouth once daily 90 Tab 3    fluticasone propionate (Flonase Allergy Relief) 50 mcg/actuation nasal spray 2 Sprays by Both Nostrils route daily as needed for Rhinitis or Allergies. 1 Bottle 0    sodium chloride (Saline Nasal Mist) 0.65 % nasal squeeze bottle 0.05 mL by Both Nostrils route as needed for Congestion. 30 mL 0    ezetimibe (ZETIA) 10 mg tablet take 1 tablet by mouth once daily 30 Tab 11    multivitamin (ONE A DAY) tablet Take 1 Tab by mouth daily.       OTHER Vitamin A supplement      glucose blood VI test strips (ONETOUCH VERIO) strip Patient checks blood sugar daily, Dx E11.9 100 Strip 3    glucose blood VI test strips (ONETOUCH ULTRA TEST) strip Patient to test Blood sugar 1 a day DX: E11.65 100 Strip 3       Allergies   Allergen Reactions    Hydrochlorothiazide Other (comments)     Weight loss, pt unsure of allergy      Metformin Other (comments)     paresthesia       Past Surgical History:   Procedure Laterality Date    HX COLONOSCOPY      Dr. Fouzia Cain 6/12 neg; Dr Jeanie Carrel 7/15/20 polyp    HX CYSTECTOMY      HX GI  2/01    negative barium enema    HX ORTHOPAEDIC  11/2015    medial meniscus tear left knee Dr Vidal [facility]  03/2018    CT head neg       Social History     Socioeconomic History    Marital status:      Spouse name: Not on file    Number of children: 2    Years of education: Not on file    Highest education level: Not on file   Occupational History    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   Social Needs    Financial resource strain: Not on file    Food insecurity     Worry: Not on file     Inability: Not on file   [employer] needs     Medical: Not on file     Non-medical: Not on file   Tobacco Use    Smoking status: Never Smoker    Smokeless tobacco: Never Used   Substance and Sexual Activity    Alcohol use: No    Drug use: No    Sexual activity: Yes   Lifestyle    Physical activity     Days per week: Not on file     Minutes per session: Not on file    Stress: Not on file   Relationships    Social connections     Talks on phone: Not on file     Gets together: Not on file     Attends Judaism service: Not on file     Active member of club or organization: Not on file     Attends meetings of clubs or organizations: Not on file     Relationship status: Not on file    Intimate partner violence     Fear of current or ex partner: Not on file     Emotionally abused: Not on file     Physically abused: Not on file     Forced sexual activity: Not on file   Other Topics Concern    Not on file   Social History Narrative    Not on file       Visit Vitals  Pulse 87   Resp 16   Ht 6' 3\" (1.905 m)   Wt 119.3 kg (263 lb)   SpO2 99%   BMI 32.87 kg/m²         PHYSICAL EXAMINATION:  GENERAL: Alert and oriented x3, in no acute distress, well-developed, well-nourished, afebrile. HEART: No JVD. EYES: No scleral icterus   NECK: No significant lymphadenopathy   LUNGS: No respiratory compromise or indrawing  ABDOMEN: Soft, non-tender, non-distended. Electronically signed by:  Adriana Luna MD

## 2021-05-03 ENCOUNTER — TRANSCRIBE ORDER (OUTPATIENT)
Dept: RADIATION THERAPY | Age: 61
End: 2021-05-03

## 2021-05-03 DIAGNOSIS — C61 MALIGNANT NEOPLASM OF PROSTATE (HCC): Primary | ICD-10-CM

## 2021-05-05 ENCOUNTER — OFFICE VISIT (OUTPATIENT)
Dept: INTERNAL MEDICINE CLINIC | Age: 61
End: 2021-05-05
Payer: COMMERCIAL

## 2021-05-05 ENCOUNTER — HOSPITAL ENCOUNTER (OUTPATIENT)
Dept: LAB | Age: 61
Discharge: HOME | End: 2021-05-05
Payer: COMMERCIAL

## 2021-05-05 VITALS
RESPIRATION RATE: 14 BRPM | DIASTOLIC BLOOD PRESSURE: 84 MMHG | SYSTOLIC BLOOD PRESSURE: 138 MMHG | BODY MASS INDEX: 32.83 KG/M2 | OXYGEN SATURATION: 97 % | WEIGHT: 264 LBS | TEMPERATURE: 97.5 F | HEIGHT: 75 IN | HEART RATE: 80 BPM

## 2021-05-05 DIAGNOSIS — I10 ESSENTIAL HYPERTENSION: ICD-10-CM

## 2021-05-05 DIAGNOSIS — D35.2 PROLACTINOMA (HCC): ICD-10-CM

## 2021-05-05 DIAGNOSIS — R51.9 NONINTRACTABLE EPISODIC HEADACHE, UNSPECIFIED HEADACHE TYPE: Primary | ICD-10-CM

## 2021-05-05 DIAGNOSIS — H53.9 VISION CHANGES: ICD-10-CM

## 2021-05-05 PROCEDURE — 36415 COLL VENOUS BLD VENIPUNCTURE: CPT

## 2021-05-05 PROCEDURE — 99214 OFFICE O/P EST MOD 30 MIN: CPT | Performed by: INTERNAL MEDICINE

## 2021-05-05 PROCEDURE — 84146 ASSAY OF PROLACTIN: CPT

## 2021-05-05 NOTE — PROGRESS NOTES
64 y.o. BLACK/ male who presents for evaluation. He came off the Dostinex sometime last year by his own accord. He had been on that for prolactinoma suppression for years initially directed by Dr. Tatyana Rich in early 2000s. He is here mainly for concerns regarding headache and vision change possibly being caused by tumor. For the last 2 weeks, has been having change in his visual acuity. He actually saw Dr. Tyler Erickson who diagnosed left-sided conjunctivitis along with dry eye syndrome. He is supposed to see him back in the next week or two. Reports no visual field loss, he did have a full exam with Dr. Tyler Erickson including eye pressures, visual fields, etc.      The headache started about a week ago. Describes it as mostly a pressure sensation, 4/10 in intensity, lasts about 30 minutes at most, 1-2x/day but not daily, mostly in the left posterior occiput region. He has not had any head trauma, no prior history of migraines or chronic headaches, denied any associated vision change or focal neurologic complaints. He has not tried anything for this. Admits the sinuses could be an issue, no jaw claudication, neck pain, radicular complaints. No fevers, purulent discharge, tooth pain. Review of chart shows negative CT head in 2/18 per Dr. Jun Murphy.   His bp has not been high    Past Medical History:   Diagnosis Date    Allergic rhinitis     BPH (benign prostatic hyperplasia)     Colon polyp 07/15/2020    Dr Soo Lackey    Deviated septum and epistaxis Dr. Jimbo Marrero 2009     Diabetes mellitus [facility])     on basis of elev a1c 12/15    ED (erectile dysfunction)     Fatty liver     on US 6/10; Fib-4 was 0.91 from 5/12    H. pylori infection     EGD w dilation Dr An Botello 3/15    HSV (herpes simplex virus) infection     Hyperlipidemia     Hypertension     IFG (impaired fasting glucose) 03/2010    2 hr GTT nl     Obesity     peak 292 lbs, bmi 37.8 from 2/14; IF 4/18 start weight 280 lbs    Prolactinoma [facility])     Dr. Akosua Stroud [facility]) 02/2019    Dr Bob Tavares bx 6/12+, psa 4.4, Chandu 3+3; 2nd opinion Dr Jsesica Eason; now seeing Dr Shaw Deras; s/p ext beam radiation Dr Deb Mederos Sickle cell anemia [facility])      Past Surgical History:   Procedure Laterality Date   Juan Pablo Doll 6/12 neg; Dr Ting Marques 7/15/20 polyp    HX CYSTECTOMY      HX GI  2/01    negative barium enema    HX ORTHOPAEDIC  11/2015    medial meniscus tear left knee Dr Gin Busby  03/2018    CT head neg     Social History     Socioeconomic History    Marital status:      Spouse name: Not on file    Number of children: 2    Years of education: Not on file    Highest education level: Not on file   Occupational History    Occupation:  [employer]   Social Needs    Financial resource strain: Not on file    Food insecurity     Worry: Not on file     Inability: Not on file   [employer] needs     Medical: Not on file     Non-medical: Not on file   Tobacco Use    Smoking status: Never Smoker    Smokeless tobacco: Never Used   Substance and Sexual Activity    Alcohol use: No    Drug use: No    Sexual activity: Yes   Lifestyle    Physical activity     Days per week: Not on file     Minutes per session: Not on file    Stress: Not on file   Relationships    Social connections     Talks on phone: Not on file     Gets together: Not on file     Attends Buddhist service: Not on file     Active member of club or organization: Not on file     Attends meetings of clubs or organizations: Not on file     Relationship status: Not on file    Intimate partner violence     Fear of current or ex partner: Not on file     Emotionally abused: Not on file     Physically abused: Not on file     Forced sexual activity: Not on file   Other Topics Concern    Not on file   Social History Narrative    Not on file     Current Outpatient Medications   Medication Sig    Januvia 100 mg tablet take 1 tablet by mouth once daily    tadalafiL (CIALIS) 20 mg tablet Take 1 Tab by mouth daily as needed for Erectile Dysfunction. Indications: the inability to have an erection    valACYclovir (VALTREX) 1 gram tablet take 1 tablet by mouth once daily    benazepriL (LOTENSIN) 20 mg tablet Take 1 Tab by mouth daily.  atorvastatin (LIPITOR) 80 mg tablet take 1 tablet by mouth once daily    fluticasone propionate (Flonase Allergy Relief) 50 mcg/actuation nasal spray 2 Sprays by Both Nostrils route daily as needed for Rhinitis or Allergies.  sodium chloride (Saline Nasal Mist) 0.65 % nasal squeeze bottle 0.05 mL by Both Nostrils route as needed for Congestion.  ezetimibe (ZETIA) 10 mg tablet take 1 tablet by mouth once daily    multivitamin (ONE A DAY) tablet Take 1 Tab by mouth daily.  OTHER Vitamin A supplement    glucose blood VI test strips (ONETOUCH VERIO) strip Patient checks blood sugar daily, Dx E11.9    glucose blood VI test strips (ONETOUCH ULTRA TEST) strip Patient to test Blood sugar 1 a day DX: E11.65     No current facility-administered medications for this visit. Allergies   Allergen Reactions    Hydrochlorothiazide Other (comments)     Weight loss, pt unsure of allergy      Metformin Other (comments)     paresthesia     REVIEW OF SYSTEMS:   Ophtho - no vision change or eye pain  Oral - no mouth pain, tongue or tooth problems  Ears - no hearing loss, ear pain, fullness, no swallowing problems  Cardiac - no CP, PND, orthopnea, edema, palpitations or syncope  Chest - no breast masses  Resp - no wheezing, chronic coughing, dyspnea  GI - no heartburn, nausea, vomiting, change in bowel habits, bleeding, hemorrhoids  Urinary - no dysuria, hematuria, flank pain, urgency, frequency    Visit Vitals  /84   Pulse 80   Temp 97.5 °F (36.4 °C) (Temporal)   Resp 14   Ht 6' 3\" (1.905 m)   Wt 264 lb (119.7 kg)   SpO2 97%   BMI 33.00 kg/m²   A&O x3  Affect is appropriate.   Mood stable  No apparent distress  Anicteric, no JVD, adenopathy or thyromegaly. No carotid bruits or radiated murmur  Lungs clear to auscultation, no wheezes or rales  Heart showed regular rate and rhythm. No murmur, rubs, gallops  Abdomen soft nontender, no hepatosplenomegaly or masses. Extremities without edema. Pulses 1-2+ symmetrically  Cranial nerves II through XII are normal.  Visual fields appear normal on quick exam.  Sinuses nontender, oropharynx otherwise benign. No adenopathy. Assessment and plan:  1. Headache. Etiology unclear but doubtful for mass-effect. Use prn meds for now  2. Hypertension. Appears controlled  3. History of prolactinoma. Discussed going straight MRI versus checking prolactin level. Very concerned about cost so he wants to try the latter first.  4.  Vision problems. He will follow-up with Dr Valerio Caceres        Above conditions discussed at length and patient vocalized understanding.   All questions answered to patient satisfaction

## 2021-05-05 NOTE — PROGRESS NOTES
Bella Squires presents today for   Chief Complaint   Patient presents with    Headache     lightheaded onset lastweek              Depression Screening:  3 most recent PHQ Screens 5/5/2021   Little interest or pleasure in doing things Not at all   Feeling down, depressed, irritable, or hopeless Not at all   Total Score PHQ 2 0       Learning Assessment:  Learning Assessment 7/17/2019   PRIMARY LEARNER Patient   HIGHEST LEVEL OF EDUCATION - PRIMARY LEARNER  GRADUATED HIGH SCHOOL OR GED   BARRIERS PRIMARY LEARNER NONE   CO-LEARNER CAREGIVER No   PRIMARY LANGUAGE ENGLISH   LEARNER PREFERENCE PRIMARY DEMONSTRATION     READING     LISTENING     PICTURES     VIDEOS     DEMONSTRATION   ANSWERED BY Calvin Cousin   RELATIONSHIP SELF       Abuse Screening:  Abuse Screening Questionnaire 5/5/2021   Do you ever feel afraid of your partner? N   Are you in a relationship with someone who physically or mentally threatens you? N   Is it safe for you to go home? Y       Fall Risk  No flowsheet data found. Coordination of Care:  1. Have you been to the ER, urgent care clinic since your last visit? Hospitalized since your last visit? no    2. Have you seen or consulted any other health care providers outside of the [address] [place] [address] since your last visit? Include any pap smears or colon screening.  no

## 2021-05-06 ENCOUNTER — TELEPHONE (OUTPATIENT)
Dept: INTERNAL MEDICINE CLINIC | Age: 61
End: 2021-05-06

## 2021-05-06 DIAGNOSIS — E11.65 UNCONTROLLED TYPE 2 DIABETES MELLITUS WITH HYPERGLYCEMIA (HCC): Primary | ICD-10-CM

## 2021-05-06 DIAGNOSIS — D35.2 PROLACTINOMA (HCC): ICD-10-CM

## 2021-05-06 LAB — PROLACTIN SERPL-MCNC: 10.3 NG/ML

## 2021-05-06 NOTE — TELEPHONE ENCOUNTER
Pt stated since test was negative do he need to continue medication or not. If so he needs a refill. He stated you would know what medication he was talking about.

## 2021-05-06 NOTE — TELEPHONE ENCOUNTER
Patient called and thought of something that he wanted to ask about. Patient would like a call back.

## 2021-05-07 DIAGNOSIS — M25.561 ACUTE PAIN OF RIGHT KNEE: ICD-10-CM

## 2021-05-07 NOTE — TELEPHONE ENCOUNTER
Patient is aware per Dr. Holly De La Rosa:  Can hold off and recheck the prolactin level in 6 months  If climbing, we can start back. Patient verbalizes understanding.

## 2021-06-02 ENCOUNTER — HOSPITAL ENCOUNTER (OUTPATIENT)
Dept: LAB | Age: 61
Discharge: HOME | End: 2021-06-02
Payer: COMMERCIAL

## 2021-06-02 DIAGNOSIS — C61 MALIGNANT NEOPLASM OF PROSTATE (HCC): ICD-10-CM

## 2021-06-02 LAB — PSA SERPL-MCNC: 3.5 NG/ML (ref 0–4)

## 2021-06-02 PROCEDURE — 84153 ASSAY OF PSA TOTAL: CPT

## 2021-06-02 PROCEDURE — 84403 ASSAY OF TOTAL TESTOSTERONE: CPT

## 2021-06-02 PROCEDURE — 36415 COLL VENOUS BLD VENIPUNCTURE: CPT

## 2021-06-03 LAB
COMMENT, TESC2: NORMAL
TESTOST SERPL-MCNC: 327 NG/DL (ref 264–916)

## 2021-06-18 ENCOUNTER — HOSPITAL ENCOUNTER (OUTPATIENT)
Dept: RADIATION THERAPY | Age: 61
Discharge: HOME | End: 2021-06-18
Payer: COMMERCIAL

## 2021-06-18 PROCEDURE — 99211 OFF/OP EST MAY X REQ PHY/QHP: CPT

## 2021-08-13 ENCOUNTER — TRANSCRIBE ORDER (OUTPATIENT)
Dept: RADIATION THERAPY | Age: 61
End: 2021-08-13

## 2021-08-13 DIAGNOSIS — C61 MALIGNANT NEOPLASM OF PROSTATE (HCC): Primary | ICD-10-CM

## 2021-08-31 ENCOUNTER — HOSPITAL ENCOUNTER (OUTPATIENT)
Dept: LAB | Age: 61
Discharge: HOME | End: 2021-08-31
Payer: COMMERCIAL

## 2021-08-31 DIAGNOSIS — D35.2 PROLACTINOMA (HCC): ICD-10-CM

## 2021-08-31 DIAGNOSIS — E11.65 UNCONTROLLED TYPE 2 DIABETES MELLITUS WITH HYPERGLYCEMIA (HCC): ICD-10-CM

## 2021-08-31 LAB
ALBUMIN SERPL-MCNC: 3.7 G/DL (ref 3.4–5)
ALBUMIN/GLOB SERPL: 1.2 {RATIO} (ref 0.8–1.7)
ALP SERPL-CCNC: 55 U/L (ref 45–117)
ALT SERPL-CCNC: 24 U/L (ref 16–61)
ANION GAP SERPL CALC-SCNC: 6 MMOL/L (ref 3–18)
AST SERPL-CCNC: 23 U/L (ref 10–38)
BILIRUB SERPL-MCNC: 0.9 MG/DL (ref 0.2–1)
BUN SERPL-MCNC: 13 MG/DL (ref 7–18)
BUN/CREAT SERPL: 11 (ref 12–20)
CALCIUM SERPL-MCNC: 8.8 MG/DL (ref 8.5–10.1)
CHLORIDE SERPL-SCNC: 106 MMOL/L (ref 100–111)
CO2 SERPL-SCNC: 30 MMOL/L (ref 21–32)
CREAT SERPL-MCNC: 1.19 MG/DL (ref 0.6–1.3)
EST. AVERAGE GLUCOSE BLD GHB EST-MCNC: 126 MG/DL
GLOBULIN SER CALC-MCNC: 3.1 G/DL (ref 2–4)
GLUCOSE SERPL-MCNC: 97 MG/DL (ref 74–99)
HBA1C MFR BLD: 6 % (ref 4.2–5.6)
POTASSIUM SERPL-SCNC: 4 MMOL/L (ref 3.5–5.5)
PROT SERPL-MCNC: 6.8 G/DL (ref 6.4–8.2)
SODIUM SERPL-SCNC: 142 MMOL/L (ref 136–145)

## 2021-08-31 PROCEDURE — 80053 COMPREHEN METABOLIC PANEL: CPT

## 2021-08-31 PROCEDURE — 36415 COLL VENOUS BLD VENIPUNCTURE: CPT

## 2021-08-31 PROCEDURE — 84146 ASSAY OF PROLACTIN: CPT

## 2021-08-31 PROCEDURE — 83036 HEMOGLOBIN GLYCOSYLATED A1C: CPT

## 2021-09-01 LAB — PROLACTIN SERPL-MCNC: 10.5 NG/ML

## 2021-09-08 NOTE — PROGRESS NOTES
64 y.o. BLACK/ male who presents for f/u    He saw Dr Raegan Da Silva for the prostate ca but has not follow up with Dr Veronica Botello. Concerned as he has lost his sex drive after the radiation therapy. The cialis is not helping    Denies any cardiovascular complaints. No set exercise but he is active with work.       Denied any gi complaints    Still doing 1-2 meals daily but weight not budging    Vitals 9/13/2021 5/5/2021 5/5/2021 4/30/2021 4/6/2021   Weight 265 lb  264 lb 263 lb 260 lb 3.2 oz     He is complaining of his internal nares 'feeling burned'  But ENT did not have any specific dx    IF 4/18    Past Medical History:   Diagnosis Date    Allergic rhinitis     BPH (benign prostatic hyperplasia)     Colon polyp 07/15/2020    Dr Cristina Cleveland    Deviated septum and epistaxis Dr. Chencho Murry 2009     Diabetes mellitus ([address]     on basis of elev a1c 12/15    ED (erectile dysfunction)     Fatty liver     on US 6/10; Fib-4 was 0.91 from 5/12    H. pylori infection     EGD w dilation Dr Jamila Mclean 3/15    HSV (herpes simplex virus) infection     Hyperlipidemia     Hypertension     IFG (impaired fasting glucose) 03/2010    2 hr GTT nl     Obesity     peak 292 lbs, bmi 37.8 from 2/14; IF 4/18 start weight 280 lbs    Prolactinoma (HCC)     Dr. Fredi Garvin Prostate cancer [facility]) 02/2019    Dr Rita Tafoya bx 6/12+, psa 4.4, Chandu 3+3; 2nd opinion Dr Valerie Hudson; now seeing Dr Veronica Botello; s/p ext beam radiation Dr Mares Blind Sickle cell anemia [facility])      Past Surgical History:   Procedure Laterality Date   [facility]      Dr. 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   Tobacco Use    Smoking status: Never Smoker    Smokeless tobacco: Never Used   Substance and Sexual Activity    Alcohol use: No    Drug use: No    Sexual activity: Yes   Other Topics Concern    Not on file   Social History Narrative    Not on file     Social Determinants of Health     Financial Resource Strain:     Difficulty of Paying Living Expenses:    Food Insecurity:     Worried About Running Out of Food in the Last Year:     [address] in the Last Year:    Transportation Needs:     Lack of Transportation (Medical):  Lack of Transportation (Non-Medical):    Physical Activity:     Days of Exercise per Week:     Minutes of Exercise per Session:    Stress:     Feeling of Stress :    Social Connections:     Frequency of Communication with Friends and Family:     Frequency of Social Gatherings with Friends and Family:     Attends Jehovah's witness Services:     Active Member of Clubs or Organizations:     Attends Club or Organization Meetings:     Marital Status:    Intimate Partner Violence:     Fear of Current or Ex-Partner:     Emotionally Abused:     Physically Abused:     Sexually Abused: Allergies   Allergen Reactions    Hydrochlorothiazide Other (comments)     Weight loss, pt unsure of allergy      Metformin Other (comments)     paresthesia     Current Outpatient Medications   Medication Sig    Januvia 100 mg tablet take 1 tablet by mouth once daily    tadalafiL (CIALIS) 20 mg tablet Take 1 Tab by mouth daily as needed for Erectile Dysfunction. Indications: the inability to have an erection    valACYclovir (VALTREX) 1 gram tablet take 1 tablet by mouth once daily    benazepriL (LOTENSIN) 20 mg tablet Take 1 Tab by mouth daily.     atorvastatin (LIPITOR) 80 mg tablet take 1 tablet by mouth once daily    ezetimibe (ZETIA) 10 mg tablet take 1 tablet by mouth once daily    multivitamin (ONE A DAY) tablet Take 1 Tab by mouth daily.  OTHER Vitamin A supplement    glucose blood VI test strips (ONETOUCH VERIO) strip Patient checks blood sugar daily, Dx E11.9    glucose blood VI test strips (ONETOUCH ULTRA TEST) strip Patient to test Blood sugar 1 a day DX: E11.65    fluticasone propionate (Flonase Allergy Relief) 50 mcg/actuation nasal spray 2 Sprays by Both Nostrils route daily as needed for Rhinitis or Allergies. (Patient not taking: Reported on 9/13/2021)    sodium chloride (Saline Nasal Mist) 0.65 % nasal squeeze bottle 0.05 mL by Both Nostrils route as needed for Congestion. (Patient not taking: Reported on 9/13/2021)     No current facility-administered medications for this visit. REVIEW OF SYSTEMS: colo 6/12 Dr Bond Dry  Ophtho - no vision change or eye pain  Oral - no mouth pain, tongue or tooth problems  Ears - no hearing loss, ear pain, fullness, no swallowing problems  Cardiac - no CP, PND, orthopnea, edema, palpitations or syncope  Chest - no breast masses  Resp - no wheezing, chronic coughing, dyspnea  GI - no heartburn, nausea, vomiting, change in bowel habits, bleeding, hemorrhoids  Urinary - no dysuria, hematuria, flank pain, urgency, frequency    Visit Vitals  /85   Pulse 94   Temp 98.1 °F (36.7 °C) (Temporal)   Resp 16   Ht 6' 3\" (1.905 m)   Wt 265 lb (120.2 kg)   SpO2 98%   BMI 33.12 kg/m²      A&O x3  Affect is appropriate. Mood stable  No apparent distress  Anicteric, no JVD, adenopathy or thyromegaly. No carotid bruits or radiated murmur  Lungs clear to auscultation, no wheezes or rales  Heart showed regular rate and rhythm. No murmur, rubs, gallops  Abdomen soft nontender, no hepatosplenomegaly or masses. Extremities without edema.   Pulses 1-2+ symmetrically    LABS  From 2/10 showed    gluc 111, cr 1.10, gfr>60, alt 51, hba1c 6.0,                   chol 278, tg 122,  hdl 56, ldl-c 198, wbc 3.2, hb 13.8, plt 230, psa 0.60  From 3/10 showed 2hr GTT 88  From 6/10 showed    gluc 105, cr 1.30, gfr>60, alt 59,                              wbc 3.6, hb 13.9, plt 237  From 10/11 showed  gluc 102, cr 1.18, gfr 82,  alt 22, hba1c 6.5,                             wbc 3.5, hb 13.5, plt 255, psa 1.20, prl 30.8  From 11/11 showed                ldl-p 1245,                                 2 hr GTT 89  From 2/12 showed                                         ua neg  From 5/12 showed    gluc 109, cr 1.10, gfr>60, alt 17, hba1c 6.1,                   chol 224, tg 70,   hdl 70, ldl-c 140, wbc 3.8, hb 14.3, plt 250, psa 1.08  From 10/12 showed  gluc 98,   cr 1.00, gfr>60, alt 23, hba1c 6.2, ldl-p 1647, chol 216, tg 64,   hdl 63, ldl-c 148  From 1/14 showed    gluc 99,   cr 1.30, gfr 58,  alt 31, hba1c 6.4,      chol 196, tg 81,   hdl 67, ldl-c 113  From 8/14 showed    gluc 100, cr 1.20, gfr>60, alt 19, hba1c 6.2, umar neg   chol 288, tg 140, hdl 69, ldl-c 191         From 9/14 showed                       tsh 1.84  From 2/15 showed    gluc 118, cr 1.34, gfr 68,  alt 19, hba1c 6.4, umar neg   chol 301, tg 190, hdl 63, ldl-c 200,     From 8/15 showed         hba1c 6.3,      chol 267, tg 90,   hdl 70, ldl-c 179  From 12/15 showed  gluc 102, cr 1.29, gfr>60,     hba1c 6.8, umar neg  From 3/16 showed    gluc 96,   cr 1.23, gfr>60,                   wbc 4.1, hb 13.3, plt 240  From 10/16 showed  gluc 110, cr 1.13, gfr>60, alt 29, hba1c 6.4,      chol 233, tg 120, hdl 73, ldl-c 136  From 9/17 showed    gluc 99,   cr 1.25, gfr>60, alt 29, hba1c 6.5, umar neg, chol 240, tg 93,   hdl 79, ldl-c 142, wbc 3.9, hb 14.4, plt 217, psa 3.80  From 3/18 showed    gluc 103, cr 1.30, gfr>60, alt 27, hba1c 6.3,      chol 251, tg 96,   hdl 88, ldl-c 144, tsh 2.69,                 psa 3.70, b12 559, fol 14.1, free t4 1.20, hiv neg, rpr neg, hep b/c neg  From 1/19 showed                           umar neg  chol 156, tg 47,   hdl 99, ldl-c 48,           From 2/19 showed                    hba1c 7.0,                              hep b/c neg, hiv neg, tpall neg  From 7/19 showed    gluc 103, cr 1.38, gfr>60  From 2/20 showed    gluc 92,   cr 1.22, gfr>60, alt 30, hba1c 6.3, umar neg, chol 160, tg 42,   hdl 91, ldl-c 61,   wbc 3.8, hb 13.7, plt 217  From 3/20 showed    gluc 90,   cr 1.30, gfr>60,                psa 3.61 ferritin 283, b12 477, fol 11.9  From 8/20 showed    gluc 91,   cr 1.29, gfr>60,                psa 4.80  From 2/21 showed    gluc 91,   cr 1.09, gfr>60, alt 25, hba1c 6.1, umar 5,     chol 183, tg 49,   hdl 93, ldl-c 80, wbc 3.6, hb 13.6, plt 230    Results for orders placed or performed during the hospital encounter of 08/31/21   PROLACTIN   Result Value Ref Range    Prolactin 24.7 ng/mL   METABOLIC PANEL, COMPREHENSIVE   Result Value Ref Range    Sodium 142 136 - 145 mmol/L    Potassium 4.0 3.5 - 5.5 mmol/L    Chloride 106 100 - 111 mmol/L    CO2 30 21 - 32 mmol/L    Anion gap 6 3.0 - 18 mmol/L    Glucose 97 74 - 99 mg/dL    BUN 13 7.0 - 18 MG/DL    Creatinine 1.19 0.6 - 1.3 MG/DL    BUN/Creatinine ratio 11 (L) 12 - 20      GFR est AA >60 >60 ml/min/1.73m2    GFR est non-AA >60 >60 ml/min/1.73m2    Calcium 8.8 8.5 - 10.1 MG/DL    Bilirubin, total 0.9 0.2 - 1.0 MG/DL    ALT (SGPT) 24 16 - 61 U/L    AST (SGOT) 23 10 - 38 U/L    Alk.  phosphatase 55 45 - 117 U/L    Protein, total 6.8 6.4 - 8.2 g/dL    Albumin 3.7 3.4 - 5.0 g/dL    Globulin 3.1 2.0 - 4.0 g/dL    A-G Ratio 1.2 0.8 - 1.7     HEMOGLOBIN A1C WITH EAG   Result Value Ref Range    Hemoglobin A1c 6.0 (H) 4.2 - 5.6 %    Est. 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 9) with comorbidity ([facility] E66.01    Prostate cancer ([facility] C61     Assessment and plan:  1. Ortho. F/U Dr Lila Reece  2. Obesity. Lifestyle and dietary measures, previously declined appetite supp. Trying to stick with IF  3. DM. At target, continue januvia  4. Erectile dysfunction. Prn cialis  5. Prolactinoma. He is off dostinex at this time  6. HLP. At target on lipitor and zetia  7. HTN. Controlled on lotensin  8. Prostate ca. Per Dr Emily Colvin and will send back to Dr Jong Sena regarding his concerns about dec drive      RTC 1/72    Above conditions discussed at length and patient vocalized understanding. All questions answered to patient satisfaction        ICD-10-CM ICD-9-CM    1. Uncontrolled type 2 diabetes mellitus with hyperglycemia (HCC)  E11.65 250.02 CBC W/O DIFF      MICROALBUMIN, UR, RAND W/ MICROALB/CREAT RATIO      HEMOGLOBIN A1C WITH EAG   2. Prostate cancer ([facility]  C61 185 REFERRAL TO UROLOGY   3. Essential hypertension  I10 401.9    4. 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 Sharyle Mormon  D35.2 227.3    5.  Hyperlipidemia, unspecified hyperlipidemia type  E78.5 272.4 LIPID PANEL

## 2021-09-13 ENCOUNTER — OFFICE VISIT (OUTPATIENT)
Dept: INTERNAL MEDICINE CLINIC | Age: 61
End: 2021-09-13
Payer: COMMERCIAL

## 2021-09-13 VITALS
RESPIRATION RATE: 16 BRPM | SYSTOLIC BLOOD PRESSURE: 136 MMHG | BODY MASS INDEX: 32.95 KG/M2 | HEART RATE: 94 BPM | DIASTOLIC BLOOD PRESSURE: 85 MMHG | TEMPERATURE: 98.1 F | OXYGEN SATURATION: 98 % | HEIGHT: 75 IN | WEIGHT: 265 LBS

## 2021-09-13 DIAGNOSIS — E11.65 UNCONTROLLED TYPE 2 DIABETES MELLITUS WITH HYPERGLYCEMIA (HCC): Primary | ICD-10-CM

## 2021-09-13 DIAGNOSIS — C61 PROSTATE CANCER (HCC): ICD-10-CM

## 2021-09-13 DIAGNOSIS — E78.5 HYPERLIPIDEMIA, UNSPECIFIED HYPERLIPIDEMIA TYPE: ICD-10-CM

## 2021-09-13 DIAGNOSIS — D35.2 PROLACTINOMA (HCC): ICD-10-CM

## 2021-09-13 DIAGNOSIS — I10 ESSENTIAL HYPERTENSION: ICD-10-CM

## 2021-09-13 PROCEDURE — 99214 OFFICE O/P EST MOD 30 MIN: CPT | Performed by: INTERNAL MEDICINE

## 2021-09-13 NOTE — PROGRESS NOTES
Naomi Waller presents today for   Chief Complaint   Patient presents with    Follow-up     6 month     Cholesterol Problem    Hypertension    Diabetes    Labs     8-31-21         Coordination of Care:  1. Have you been to the ER, urgent care clinic since your last visit? Hospitalized since your last visit? NO    2. Have you seen or consulted any other health care providers outside of the [address] [place] [address] since your last visit? Include any pap smears or colon screening.  NO

## 2021-11-21 DIAGNOSIS — E78.5 HYPERLIPIDEMIA, UNSPECIFIED HYPERLIPIDEMIA TYPE: ICD-10-CM

## 2021-11-21 RX ORDER — ATORVASTATIN CALCIUM 80 MG/1
TABLET, FILM COATED ORAL
Qty: 90 TABLET | Refills: 3 | Status: SHIPPED | OUTPATIENT
Start: 2021-11-21

## 2021-12-17 ENCOUNTER — HOSPITAL ENCOUNTER (OUTPATIENT)
Dept: RADIATION THERAPY | Age: 61
Discharge: HOME | End: 2021-12-17
Payer: COMMERCIAL

## 2021-12-17 PROCEDURE — 99213 OFFICE O/P EST LOW 20 MIN: CPT | Performed by: RADIOLOGY

## 2021-12-17 PROCEDURE — 99211 OFF/OP EST MAY X REQ PHY/QHP: CPT

## 2021-12-29 ENCOUNTER — APPOINTMENT (OUTPATIENT)
Dept: GENERAL RADIOLOGY | Age: 61
End: 2021-12-29
Attending: EMERGENCY MEDICINE
Payer: COMMERCIAL

## 2021-12-29 ENCOUNTER — HOSPITAL ENCOUNTER (EMERGENCY)
Age: 61
Discharge: HOME | End: 2021-12-29
Attending: EMERGENCY MEDICINE
Payer: COMMERCIAL

## 2021-12-29 VITALS
TEMPERATURE: 99.7 F | HEART RATE: 94 BPM | WEIGHT: 270 LBS | OXYGEN SATURATION: 98 % | RESPIRATION RATE: 18 BRPM | HEIGHT: 75 IN | SYSTOLIC BLOOD PRESSURE: 186 MMHG | BODY MASS INDEX: 33.57 KG/M2 | DIASTOLIC BLOOD PRESSURE: 98 MMHG

## 2021-12-29 DIAGNOSIS — U07.1 COVID: Primary | ICD-10-CM

## 2021-12-29 LAB
COVID-19 RAPID TEST, COVR: DETECTED
SOURCE, COVRS: ABNORMAL

## 2021-12-29 PROCEDURE — 74011000258 HC RX REV CODE- 258: Performed by: EMERGENCY MEDICINE

## 2021-12-29 PROCEDURE — 71045 X-RAY EXAM CHEST 1 VIEW: CPT

## 2021-12-29 PROCEDURE — 87635 SARS-COV-2 COVID-19 AMP PRB: CPT

## 2021-12-29 PROCEDURE — 96374 THER/PROPH/DIAG INJ IV PUSH: CPT

## 2021-12-29 PROCEDURE — 99283 EMERGENCY DEPT VISIT LOW MDM: CPT

## 2021-12-29 PROCEDURE — M0243 CASIRIVI AND IMDEVI INFUSION: HCPCS

## 2021-12-29 PROCEDURE — 74011250636 HC RX REV CODE- 250/636: Performed by: EMERGENCY MEDICINE

## 2021-12-29 PROCEDURE — 74011000636 HC RX REV CODE- 636: Performed by: EMERGENCY MEDICINE

## 2021-12-29 PROCEDURE — 74011000250 HC RX REV CODE- 250: Performed by: EMERGENCY MEDICINE

## 2021-12-29 RX ORDER — LIDOCAINE HYDROCHLORIDE 20 MG/ML
15 SOLUTION OROPHARYNGEAL ONCE
Status: COMPLETED | OUTPATIENT
Start: 2021-12-29 | End: 2021-12-29

## 2021-12-29 RX ORDER — KETOROLAC TROMETHAMINE 30 MG/ML
15 INJECTION, SOLUTION INTRAMUSCULAR; INTRAVENOUS ONCE
Status: COMPLETED | OUTPATIENT
Start: 2021-12-29 | End: 2021-12-29

## 2021-12-29 RX ADMIN — KETOROLAC TROMETHAMINE 15 MG: 30 INJECTION, SOLUTION INTRAMUSCULAR; INTRAVENOUS at 06:59

## 2021-12-29 RX ADMIN — CASIRIVIMAB AND IMDEVIMAB 1200 MG: 600; 600 INJECTION, SOLUTION, CONCENTRATE INTRAVENOUS at 07:00

## 2021-12-29 RX ADMIN — LIDOCAINE HYDROCHLORIDE 15 ML: 20 SOLUTION OROPHARYNGEAL at 05:36

## 2021-12-29 NOTE — PROGRESS NOTES
Pharmacy Services - casirivimab/imdevimab      Completed chart review for Kael Segura. . Patient meets inclusion criteria for use and zero exclusion criteria for casirivimab/imdevimab per FDA EUA. The authorized dosage for casirivimab/imdevimab is a single intravenous (IV) infusion of casirivimab 600 mg + imdevimab 600 mg (1200mg total) in 100 mL Normal Saline (total volume 110 ml) over 23 minutes administered as soon as possible after positive viral test for SARS-CoV-2 and within 10 days of symptom onset. Pharmacy will dilute and provide a dose to be administered immediately upon receipt on the unit     Clinically monitor patients during infusion and observe patients for at least 1 hour after infusion is complete. The Fact Sheet for Patients, Parents and Caregivers (English) / Fact Sheet for Patients, Parents, and Caregivers (Portuguese) must be provided to the patient per EUA. Please contact pharmacy with any questions. Thank you. [address] [place] [address] MS DARNELL Ph  Clinical Pharmacist  [phone]      Aleksey Yane / Laurie Carbine Exclusion criteria    Aleksey Yane / Laurie Carbine is NOT authorized for use in patients:  o who are hospitalized due to COVID-19, OR  o who require oxygen therapy due to COVID-19, OR  o who require an increase in baseline oxygen flow rate due to COVID-19 in those on chronic oxygen therapy due to underlying non-COVID-19 related comorbidity. Benefit of treatment with bamlanivimab has not been observed in patients hospitalized due to COVID-19. Monoclonal antibodies, such as casirivimab / [place], may be associated with worse clinical outcomes when administered to hospitalized patients with COVID-19 requiring high flow oxygen or mechanical Ventilation.     Casirivimab / Laurie Carbine Inclusion Criteria  Adults and pediatric patients with positive results of direct SARS-CoV-2 viral testing who are 15years of age and older weighing at least 36 kg, and who are at high risk for progressing to severe COVID-19 and/or hospitalization. High Risk Criteria  High risk is defined as patients who meet at least one of the following criteria:   Have a body mass index (BMI) ? 25  33.75   Have chronic kidney disease   Have diabetes   Have immunosuppressive disease Prostate cancer   Are currently receiving immunosuppressive treatment   Are ?72years of age   Are ?54years of age AND have  o cardiovascular disease, OR  o hypertension, OR  o chronic obstructive pulmonary disease/other chronic respiratory disease. Are 15- 16years of age AND have  o BMI ? 85th percentile for their age and gender based on CDC growth charts, [email or web address], OR  o sickle cell disease, OR  o congenital or acquired heart disease, OR  o neurodevelopmental disorders, for example, cerebral palsy, OR  o a medical-related technological dependence, for example, tracheostomy,gastrostomy, or positive pressure ventilation (not related to COVID-19),  OR  o asthma, reactive airway or other chronic respiratory disease that requires daily medication for control.

## 2021-12-29 NOTE — Clinical Note
[address] [facility] EMERGENCY DEPT  [id number] [address] [phone]    Work/School Note    Date: 12/29/2021     To Whom It May concern:    Renan Driver. was evaluated by the following provider(s):  Attending Provider: Serenity Groves [address] [place] [address] virus is suspected. Per the CDC guidelines we recommend home isolation until the following conditions are all met:    1. At least 10 days have passed since symptoms first appeared and  2. At least 24 hours have passed since last fever without the use of fever-reducing medications and  3.  Symptoms (e.g., cough, shortness of breath) have improved      Sincerely,          Patricia Hickman MD

## 2021-12-29 NOTE — Clinical Note
[address] [facility] EMERGENCY DEPT  [address] [address] [phone]    Work/School Note    Date: 12/29/2021     To Whom It May concern:    Soo Saige. was evaluated by the following provider(s):  Attending Provider: Kim Wisdom [address] [place] [address] virus is suspected. Per the CDC guidelines we recommend home isolation until the following conditions are all met:    1. At least 10 days have passed since symptoms first appeared and  2. At least 24 hours have passed since last fever without the use of fever-reducing medications and  3.  Symptoms (e.g., cough, shortness of breath) have improved      Sincerely,          Keo Torres MD

## 2021-12-29 NOTE — ED NOTES
Assumed care of patient, A&Ox4 resp even and unlabored, NAD noted or indicated, ambulates with steady gait. Currently receiving regeneraon, pt denies signs or symptoms of reaction.

## 2021-12-29 NOTE — ED PROVIDER NOTES
The patient is a 70-year-old male with past medical history as listed below, who has diabetes, hyperlipidemia, hypertension and morbid obesity, who presents to the ED today with a sore throat as well as pain with swallowing. He is also having chills. He states that he has been immunized x2 for Covid but was recently exposed to a Covid positive person on Monday. He denies any other symptoms at this time.            Past Medical History:   Diagnosis Date    Allergic rhinitis     BPH (benign prostatic hyperplasia)     Colon polyp 07/15/2020    Dr Brooke Coto    Deviated septum and epistaxis Dr. Lili Fermin 2009     Diabetes mellitus [facility])     on basis of elev a1c 12/15    ED (erectile dysfunction)     Fatty liver     on US 6/10; Fib-4 was 0.91 from 5/12    H. pylori infection     EGD w dilation Dr Jorge Leon 3/15    HSV (herpes simplex virus) infection     Hyperlipidemia     Hypertension     IFG (impaired fasting glucose) 03/2010    2 hr GTT nl     Obesity     peak 292 lbs, bmi 37.8 from 2/14; IF 4/18 start weight 280 lbs    Prolactinoma (HCC)     Dr. Lila Stevenson Prostate cancer [facility]) 02/2019    Dr Laura English bx 6/12+, psa 4.4, [place] 3+3; 2nd opinion Dr Carla Beltran; now seeing Dr Maryann Romo; s/p ext beam radiation Dr Patel Bach Sickle cell anemia [facility])        Past Surgical History:   Procedure Laterality Date   [facility]      Dr. Shlomo Fuentes 6/12 neg; Dr Brooke Coto 7/15/20 polyp    HX CYSTECTOMY      HX GI  2/01    negative barium enema    HX ORTHOPAEDIC  11/2015    medial meniscus tear left knee Dr Chaparro Klein  03/2018    CT head neg         Family History:   Problem Relation Age of Onset    Hypertension Mother     Hypertension Father     Stroke Father     Diabetes Sister     Cancer Brother     Prostate Cancer Brother        Social History     Socioeconomic History    Marital status:      Spouse name: Not on file    Number of children: 2    Years of education: Not on file  Highest education level: Not on file   Occupational History    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   Tobacco Use    Smoking status: Never Smoker    Smokeless tobacco: Never Used   Substance and Sexual Activity    Alcohol use: No    Drug use: No    Sexual activity: Yes   Other Topics Concern    Not on file   Social History Narrative    Not on file     Social Determinants of Health     Financial Resource Strain:     Difficulty of Paying Living Expenses: Not on file   Food Insecurity:     Worried About Running Out of Food in the Last Year: Not on file    Frandy of Food in the Last Year: Not on file   Transportation Needs:     Lack of Transportation (Medical): Not on file    Lack of Transportation (Non-Medical): Not on file   Physical Activity:     Days of Exercise per Week: Not on file    Minutes of Exercise per Session: Not on file   Stress:     Feeling of Stress : Not on file   Social Connections:     Frequency of Communication with Friends and Family: Not on file    Frequency of Social Gatherings with Friends and Family: Not on file    Attends Adventism Services: Not on file    Active Member of [address] [place] [address] or Organizations: Not on file    Attends Club or Organization Meetings: Not on file    Marital Status: Not on file   Intimate Partner Violence:     Fear of Current or Ex-Partner: Not on file    Emotionally Abused: Not on file    Physically Abused: Not on file    Sexually Abused: Not on file   Housing Stability:     Unable to Pay for Housing in the Last Year: Not on file    Number of [place] in the Last Year: Not on file    Unstable Housing in the Last Year: Not on file         ALLERGIES: Hydrochlorothiazide and Metformin    Review of Systems   All other systems reviewed and are negative.       Vitals:    12/29/21 0307   BP: (!) 186/98   Pulse: 94   Resp: 18   Temp: 99.7 °F (37.6 °C)   SpO2: 98%   Weight: 122.5 kg (270 lb)   Height: 6' 3\" (1.905 m)            Physical Exam  Vitals and nursing note reviewed. Constitutional:       Appearance: He is obese. HENT:      Head: Normocephalic and atraumatic. Right Ear: Tympanic membrane and ear canal normal.      Left Ear: Tympanic membrane and ear canal normal.      Nose: No congestion or rhinorrhea. Mouth/Throat:      Mouth: Mucous membranes are moist.      Pharynx: Oropharyngeal exudate and posterior oropharyngeal erythema present. No pharyngeal swelling or uvula swelling. Eyes:      Conjunctiva/sclera: Conjunctivae normal.      Pupils: Pupils are equal, round, and reactive to light. Cardiovascular:      Rate and Rhythm: Normal rate and regular rhythm. Heart sounds: Normal heart sounds. Pulmonary:      Effort: Pulmonary effort is normal.      Breath sounds: Normal breath sounds. Abdominal:      General: Bowel sounds are normal.      Palpations: Abdomen is soft. Musculoskeletal:      Cervical back: Normal range of motion and neck supple. Skin:     General: Skin is warm and dry. Capillary Refill: Capillary refill takes less than 2 seconds. Neurological:      General: No focal deficit present. Mental Status: He is alert and oriented to person, place, and time. Psychiatric:         Mood and Affect: Mood normal.         Behavior: Behavior normal.        Recent Results (from the past 12 hour(s))   COVID-19 RAPID TEST    Collection Time: 12/29/21  4:02 AM   Result Value Ref Range    Specimen source Nasopharyngeal      COVID-19 rapid test Detected (AA) NOTD         XR CHEST PORT    (Results Pending)     CXR: No acute cardiopulmonary abnormality      MDM  Number of Diagnoses or Management Options  Diagnosis management comments: The patient is a 26-year-old male with past medical history significant for diabetes as well as other comorbidities, who was exposed to a patient with Covid earlier this week and now has a sore throat and pain with swallowing. We are awaiting his rapid Covid test.  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 His chest x-ray is negative. He is not hypoxic at this time. The patient's rapid Covid is positive. Candidate for Regeneron. Once he receives his Regeneron he will be discharged home and advised to follow-up with his primary care physician as soon as possible. Return precautions have been given.          Procedures

## 2021-12-29 NOTE — Clinical Note
[address] [facility] EMERGENCY DEPT  [address] [address] [phone]    Work/School Note    Date: 12/29/2021     To Whom It May concern:    Arvin Aguilar. was evaluated by the following provider(s):  Attending Provider: Yoan Chiang [address] [place] [address] virus is suspected. Per the CDC guidelines we recommend home isolation until the following conditions are all met:    1. At least 10 days have passed since symptoms first appeared and  2. At least 24 hours have passed since last fever without the use of fever-reducing medications and  3.  Symptoms (e.g., cough, shortness of breath) have improved      Sincerely,          Shira Zapata MD

## 2021-12-29 NOTE — ED NOTES
Infusion complete. IV removed with catheter intact. Discharge reviewed with patient and understanding verbalized. Patient exits through waiting area.

## 2021-12-30 ENCOUNTER — PATIENT OUTREACH (OUTPATIENT)
Dept: CASE MANAGEMENT | Age: 61
End: 2021-12-30

## 2021-12-30 NOTE — ACP (ADVANCE CARE PLANNING)
Advance Care Planning:   Does patient have an Advance Directive: healthcare decision makers on file.

## 2021-12-30 NOTE — PROGRESS NOTES
Patient contacted regarding COVID-19 diagnosis and MAB therapy. Discussed COVID-19 related testing which was available at this time. Test results were positive. Patient informed of results, if available? yes. LPN Care Coordinator contacted the patient by telephone to perform post discharge assessment. Call within 2 business days of discharge: Yes Verified name and  with patient as identifiers. Provided introduction to self, and explanation of the LPN CC role, and reason for call due to risk factors for infection and/or exposure to COVID-19. Symptoms reviewed with patient who verbalized the following symptoms: no new symptoms and no worsening symptoms  Patient states he is doing ok. Due to no new or worsening symptoms encounter was not routed to provider for escalation. Discussed follow-up appointments. If no appointment was previously scheduled, appointment scheduling offered:  no. [facility] follow up appointment(s):   Future Appointments   Date Time Provider Mo Morrison   2022  1:40 PM [facility] NURSE [facility] MYRON SCHED   2022 10:00 AM Bradley Grace,  Holly Munoz   3/17/2022  2:20 PM Miko Vasquez MD [facility] BS AMB   10/14/2022  2:00 PM [facility]     Non-[facility] follow up appointment(s): n/a       Advance Care Planning:   Does patient have an Advance Directive: healthcare decision makers on file. Patient was given an opportunity to verbalize any questions and concerns and agrees to contact LPN CC or health care provider for questions related to their healthcare. LPN CC provided contact information. Plan for follow-up call in 5-7 days based on severity of symptoms and risk factors.

## 2022-01-06 ENCOUNTER — PATIENT OUTREACH (OUTPATIENT)
Dept: CASE MANAGEMENT | Age: 62
End: 2022-01-06

## 2022-01-06 NOTE — PROGRESS NOTES
Patient resolved from [address] Transitions episode on 1/6/2022. Patient states he is doing good, still have a little cough. Patient currently reports that the following symptoms have improved:  no new symptoms and no worsening symptoms. No further outreach scheduled with this LPN CC. Episode of Care resolved. Patient has this LPN CC contact information if future needs arise.

## 2022-01-18 NOTE — TELEPHONE ENCOUNTER
----- Message from Leodan Irvin Jr., MD sent at 1/18/2022  3:45 PM EST -----  Please cancel future appointments for [facility].  We will schedule CBC with RN review in 2 weeks and Dr. Irvin 2 unit visit in 4 weeks (on Tuesday) with [facility] scheduled.     Not yet, we are behind on referrals, it should be done by tomorrow or so but its doubtful this office would get him in any sooner, neurology is hard to get in soon (within 3 months or so)

## 2022-01-28 DIAGNOSIS — I10 ESSENTIAL HYPERTENSION: ICD-10-CM

## 2022-01-31 RX ORDER — BENAZEPRIL HYDROCHLORIDE 20 MG/1
TABLET ORAL
Qty: 90 TABLET | Refills: 3 | Status: SHIPPED | OUTPATIENT
Start: 2022-01-31

## 2022-03-06 NOTE — PROGRESS NOTES
58 y.o. BLACK/ male who presents for f/u    Continues to follow up w urology for the prostate ca. Denies any cardiovascular complaints. No set exercise but he is active with work. Denied any gi complaints     Reports that his gait has been off the last few months. He feels unbalanced only with walking but no incoordination or focal neuro complaints. No dizziness, spinning sensation, weakness, CN sx.   He is interested in seeing a neurologist for this    IF 4/18    Past Medical History:   Diagnosis Date    Allergic rhinitis     BPH (benign prostatic hyperplasia)     Colon polyp 07/15/2020    Dr Seth Sampson    Deviated septum and epistaxis Dr. Micha Perdomo 2009     Diabetes mellitus ([facility] [address] 12/2015    on basis of elev a1c; neuropathy 3/22    ED (erectile dysfunction)     Fatty liver     on US 6/10; Fib-4 was 0.91 from 5/12    H. pylori infection     EGD w dilation Dr Tony Dawson 3/15    HSV (herpes simplex virus) infection     Hyperlipidemia     Hypertension     IFG (impaired fasting glucose) 03/2010    2 hr GTT nl     Obesity     peak 292 lbs, bmi 37.8 from 2/14; IF 4/18 start weight 280 lbs    Prolactinoma (HCC)     Dr. Farfan Blunt Prostate cancer [facility]) 02/2019    Dr Kym Ross bx 6/12+, psa 4.4, [place] 3+3; 2nd opinion Dr Heaven Taveras; now seeing Dr Dottie Hughes; s/p ext beam radiation Dr Marlon Kirkpatrick Sickle cell anemia [facility])      Past Surgical History:   Procedure Laterality Date   Niyah Bell 6/12 neg; Dr Seth Sampson 7/15/20 polyp    HX CYSTECTOMY      HX GI  2/01    negative barium enema    HX ORTHOPAEDIC  11/2015    medial meniscus tear left knee Dr Shani Montano  03/2018    CT head neg     Social History     Socioeconomic History    Marital status:      Spouse name: Not on file    Number of children: 2    Years of education: Not on file    Highest education level: Not on file   Occupational History    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   Tobacco Use    Smoking status: Never Smoker    Smokeless tobacco: Never Used   Substance and Sexual Activity    Alcohol use: No    Drug use: No    Sexual activity: Yes   Other Topics Concern    Not on file   Social History Narrative    Not on file     Social Determinants of Health     Financial Resource Strain:     Difficulty of Paying Living Expenses: Not on file   Food Insecurity:     Worried About Running Out of Food in the Last Year: Not on file    Frandy of Food in the Last Year: Not on file   Transportation Needs:     Lack of Transportation (Medical): Not on file    Lack of Transportation (Non-Medical):  Not on file   Physical Activity:     Days of Exercise per Week: Not on file    Minutes of Exercise per Session: Not on file   Stress:     Feeling of Stress : Not on file   Social Connections:     Frequency of Communication with Friends and Family: Not on file    Frequency of Social Gatherings with Friends and Family: Not on file    Attends Sabianist Services: Not on file    Active Member of [address] [place] [address] or Organizations: Not on file    Attends Club or Organization Meetings: Not on file    Marital Status: Not on file   Intimate Partner Violence:     Fear of Current or Ex-Partner: Not on file    Emotionally Abused: Not on file    Physically Abused: Not on file    Sexually Abused: Not on file   Housing Stability:     Unable to Pay for Housing in the Last Year: Not on file    Number of [place] in the Last Year: Not on file    Unstable Housing in the Last Year: Not on file     Allergies   Allergen Reactions    Hydrochlorothiazide Other (comments)     Weight loss, pt unsure of allergy      Metformin Other (comments)     paresthesia     Current Outpatient Medications   Medication Sig    benazepriL (LOTENSIN) 20 mg tablet take 1 tablet by mouth once daily    aluminum-magnesium hydroxide 200-200 mg/5 mL susp 5 mL, diphenhydrAMINE 12.5 mg/5 mL liqd 12.5 mg, lidocaine 2 % soln 5 mL 5 mL by Swish and Spit route three (3) times daily. Magic mouth wash   Maalox  Lidocaine 2% viscous   Diphenhydramine oral solution     Pharmacy to mix equal portions of ingredients to a total volume as indicated in the dispense amount.  tadalafiL (CIALIS) 5 mg tablet Take 1 Tablet by mouth daily as needed for Erectile Dysfunction.  atorvastatin (LIPITOR) 80 mg tablet take 1 tablet by mouth once daily    Januvia 100 mg tablet take 1 tablet by mouth once daily    tadalafiL (CIALIS) 20 mg tablet Take 1 Tab by mouth daily as needed for Erectile Dysfunction. Indications: the inability to have an erection    valACYclovir (VALTREX) 1 gram tablet take 1 tablet by mouth once daily    fluticasone propionate (Flonase Allergy Relief) 50 mcg/actuation nasal spray 2 Sprays by Both Nostrils route daily as needed for Rhinitis or Allergies.  sodium chloride (Saline Nasal Mist) 0.65 % nasal squeeze bottle 0.05 mL by Both Nostrils route as needed for Congestion.  ezetimibe (ZETIA) 10 mg tablet take 1 tablet by mouth once daily    multivitamin (ONE A DAY) tablet Take 1 Tab by mouth daily.  OTHER Vitamin A supplement    glucose blood VI test strips (ONETOUCH VERIO) strip Patient checks blood sugar daily, Dx E11.9    glucose blood VI test strips (ONETOUCH ULTRA TEST) strip Patient to test Blood sugar 1 a day DX: E11.65     No current facility-administered medications for this visit.      REVIEW OF SYSTEMS: colo 6/12 Dr Mae Alvarez  Ophtho - no vision change or eye pain  Oral - no mouth pain, tongue or tooth problems  Ears - no hearing loss, ear pain, fullness, no swallowing problems  Cardiac - no CP, PND, orthopnea, edema, palpitations or syncope  Chest - no breast masses  Resp - no wheezing, chronic coughing, dyspnea  GI - no heartburn, nausea, vomiting, change in bowel habits, bleeding, hemorrhoids  Urinary - no dysuria, hematuria, flank pain, urgency, frequency    Visit Vitals  /80   Pulse 84   Temp 97.5 °F (36.4 °C) (Temporal)   Resp 16   Ht 6' 3\" (1.905 m)   Wt 262 lb (118.8 kg)   SpO2 98%   BMI 32.75 kg/m²      A&O x3  Affect is appropriate. Mood stable  No apparent distress  Anicteric, no JVD, adenopathy or thyromegaly. No carotid bruits or radiated murmur  Lungs clear to auscultation, no wheezes or rales  Heart showed regular rate and rhythm. No murmur, rubs, gallops  Abdomen soft nontender, no hepatosplenomegaly or masses. Extremities without edema.   Pulses 1-2+ symmetrically  Foot exam bilaterally showed  Reflexes 2+   Vibration, proprioception, filament test dec in toes  Pulses 1-2+ DP and PT  No deformities noted  No onychomycosis    LABS  From 2/10 showed    gluc 111, cr 1.10, gfr>60, alt 51, hba1c 6.0,                   chol 278, tg 122,  hdl 56, ldl-c 198, wbc 3.2, hb 13.8, plt 230, psa 0.60  From 3/10 showed                                                2hr GTT 88  From 6/10 showed    gluc 105, cr 1.30, gfr>60, alt 59,                              wbc 3.6, hb 13.9, plt 237  From 10/11 showed  gluc 102, cr 1.18, gfr 82,  alt 22, hba1c 6.5,                             wbc 3.5, hb 13.5, plt 255, psa 1.20, prl 30.8  From 11/11 showed                ldl-p 1245,                                 2 hr GTT 89  From 2/12 showed                                         ua neg  From 5/12 showed    gluc 109, cr 1.10, gfr>60, alt 17, hba1c 6.1,                   chol 224, tg 70,   hdl 70, ldl-c 140, wbc 3.8, hb 14.3, plt 250, psa 1.08  From 10/12 showed  gluc 98,   cr 1.00, gfr>60, alt 23, hba1c 6.2, ldl-p 1647, chol 216, tg 64,   hdl 63, ldl-c 148  From 1/14 showed    gluc 99,   cr 1.30, gfr 58,  alt 31, hba1c 6.4,      chol 196, tg 81,   hdl 67, ldl-c 113  From 8/14 showed    gluc 100, cr 1.20, gfr>60, alt 19, hba1c 6.2, umar neg   chol 288, tg 140, hdl 69, ldl-c 191         From 9/14 showed                       tsh 1.84  From 2/15 showed    gluc 118, cr 1.34, gfr 68,  alt 19, hba1c 6.4, umar neg   chol 301, tg 190, hdl 63, ldl-c 200,     From 8/15 showed         hba1c 6.3,      chol 267, tg 90,   hdl 70, ldl-c 179  From 12/15 showed  gluc 102, cr 1.29, gfr>60,     hba1c 6.8, umar neg  From 3/16 showed    gluc 96,   cr 1.23, gfr>60,                   wbc 4.1, hb 13.3, plt 240  From 10/16 showed  gluc 110, cr 1.13, gfr>60, alt 29, hba1c 6.4,      chol 233, tg 120, hdl 73, ldl-c 136  From 9/17 showed    gluc 99,   cr 1.25, gfr>60, alt 29, hba1c 6.5, umar neg, chol 240, tg 93,   hdl 79, ldl-c 142, wbc 3.9, hb 14.4, plt 217, psa 3.80  From 3/18 showed    gluc 103, cr 1.30, gfr>60, alt 27, hba1c 6.3,      chol 251, tg 96,   hdl 88, ldl-c 144, tsh 2.69,                 psa 3.70, b12 559, fol 14.1, free t4 1.20, hiv neg, rpr neg, hep b/c neg  From 1/19 showed                           umar neg  chol 156, tg 47,   hdl 99, ldl-c 48,           From 2/19 showed                    hba1c 7.0,                              hep b/c neg, hiv neg, tpall neg  From 7/19 showed    gluc 103, cr 1.38, gfr>60  From 2/20 showed    gluc 92,   cr 1.22, gfr>60, alt 30, hba1c 6.3, umar neg, chol 160, tg 42,   hdl 91, ldl-c 61,   wbc 3.8, hb 13.7, plt 217  From 3/20 showed    gluc 90,   cr 1.30, gfr>60,                psa 3.61 ferritin 283, b12 477, fol 11.9  From 8/20 showed    gluc 91,   cr 1.29, gfr>60,                psa 4.80  From 2/21 showed    gluc 91,   cr 1.09, gfr>60, alt 25, hba1c 6.1, umar 5,     chol 183, tg 49,   hdl 93, ldl-c 80, wbc 3.6, hb 13.6, plt 230  From 8/21 showed    gluc 97,   cr 1.19, gfr>60, alt 24, hba1c 6.0  Form 2/22 showed                    psa 0.26, test 591    Results for orders placed or performed in visit on 02/17/22   PROSTATE SPECIFIC ANTIGEN, TOTAL (PSA)   Result Value Ref Range    Prostate Specific Ag 0.26 0.00 - 4.00 ng/mL   TESTOSTERONE   Result Value Ref Range    Testosterone 591 348 - 1,197 ng/dL     We reviewed the patient's labs from the last several visits to point out trends in the numbers            Patient Active Problem List   Diagnosis Code    Prolactin microadenoma Dr. Adali Childress D35.2    Hyperlipidemia E78.5    Essential hypertension I10    Diabetes mellitus type 2, uncontrolled ([facility] E11.65    Erectile dysfunction N52.9    Severe obesity (BMI 35.0-39. 9) with comorbidity ([facility] E66.01    Prostate cancer ([facility] C61    Diabetic polyneuropathy associated with type 2 diabetes mellitus ([facility] E11.42     Assessment and plan:  1. Ortho. F/U Dr Lulu Peralta  2. Obesity. Lifestyle and dietary measures, previously declined appetite supp. Trying to stick with IF  3. DM. Labs to be done  4. Erectile dysfunction. Prn cialis  5. Prolactinoma. Check prl next draw  6. HLP. At target on lipitor and zetia  7. HTN. Controlled on lotensin  8. Prostate ca. Per urology  9. Probable neuropathy. B12/fol ok in past, a1c has been controlled; will ask opinion form Dr Leon Bridges' group      RTC 9/22    Above conditions discussed at length and patient vocalized understanding. All questions answered to patient satisfaction        ICD-10-CM ICD-9-CM    1. Neuropathy  G62.9 355.9 REFERRAL TO NEUROLOGY       DIABETES FOOT EXAM   2. Essential hypertension  I10 401.9    3. Hyperlipidemia, unspecified hyperlipidemia type  E78.5 272.4    4. 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 Adali Childress  D35.2 227.3    5. Prostate cancer ([facility]  C61 185    6. Uncontrolled type 2 diabetes mellitus with hyperglycemia (HCC)  E11.65 250.02    7.  Diabetic polyneuropathy associated with type 2 diabetes mellitus (HCC)  E11.42 250.60  DIABETES FOOT EXAM     357.2

## 2022-03-16 ENCOUNTER — PATIENT MESSAGE (OUTPATIENT)
Dept: INTERNAL MEDICINE CLINIC | Age: 62
End: 2022-03-16

## 2022-03-17 ENCOUNTER — OFFICE VISIT (OUTPATIENT)
Dept: INTERNAL MEDICINE CLINIC | Age: 62
End: 2022-03-17
Payer: COMMERCIAL

## 2022-03-17 VITALS
WEIGHT: 262 LBS | HEIGHT: 75 IN | OXYGEN SATURATION: 98 % | TEMPERATURE: 97.5 F | SYSTOLIC BLOOD PRESSURE: 136 MMHG | HEART RATE: 84 BPM | DIASTOLIC BLOOD PRESSURE: 80 MMHG | BODY MASS INDEX: 32.58 KG/M2 | RESPIRATION RATE: 16 BRPM

## 2022-03-17 DIAGNOSIS — E11.65 UNCONTROLLED TYPE 2 DIABETES MELLITUS WITH HYPERGLYCEMIA (HCC): ICD-10-CM

## 2022-03-17 DIAGNOSIS — E78.5 HYPERLIPIDEMIA, UNSPECIFIED HYPERLIPIDEMIA TYPE: ICD-10-CM

## 2022-03-17 DIAGNOSIS — E11.42 DIABETIC POLYNEUROPATHY ASSOCIATED WITH TYPE 2 DIABETES MELLITUS (HCC): ICD-10-CM

## 2022-03-17 DIAGNOSIS — I10 ESSENTIAL HYPERTENSION: ICD-10-CM

## 2022-03-17 DIAGNOSIS — D35.2 PROLACTINOMA (HCC): ICD-10-CM

## 2022-03-17 DIAGNOSIS — C61 PROSTATE CANCER (HCC): ICD-10-CM

## 2022-03-17 DIAGNOSIS — G62.9 NEUROPATHY: Primary | ICD-10-CM

## 2022-03-17 PROBLEM — E11.9 DIABETES MELLITUS WITHOUT COMPLICATION (HCC): Status: ACTIVE | Noted: 2022-03-17

## 2022-03-17 PROBLEM — M79.603 PAIN OF UPPER EXTREMITY: Status: ACTIVE | Noted: 2022-03-17

## 2022-03-17 PROBLEM — D12.6 BENIGN NEOPLASM OF COLON: Status: ACTIVE | Noted: 2022-03-17

## 2022-03-17 PROBLEM — H52.203 UNSPECIFIED ASTIGMATISM, BILATERAL: Status: ACTIVE | Noted: 2022-03-17

## 2022-03-17 PROBLEM — J32.9 SINUSITIS: Status: ACTIVE | Noted: 2022-03-17

## 2022-03-17 PROBLEM — H52.10 MYOPIA: Status: ACTIVE | Noted: 2022-03-17

## 2022-03-17 PROBLEM — H52.4 PRESBYOPIA: Status: ACTIVE | Noted: 2022-03-17

## 2022-03-17 PROCEDURE — 3044F HG A1C LEVEL LT 7.0%: CPT | Performed by: INTERNAL MEDICINE

## 2022-03-17 PROCEDURE — 99214 OFFICE O/P EST MOD 30 MIN: CPT | Performed by: INTERNAL MEDICINE
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
## 2022-03-18 PROBLEM — C61 PROSTATE CANCER (HCC): Status: ACTIVE | Noted: 2020-03-19

## 2022-03-18 PROBLEM — N52.9 ERECTILE DYSFUNCTION: Status: ACTIVE | Noted: 2017-09-29

## 2022-03-19 ENCOUNTER — HOSPITAL ENCOUNTER (OUTPATIENT)
Dept: LAB | Age: 62
Discharge: HOME | End: 2022-03-19
Payer: COMMERCIAL

## 2022-03-19 DIAGNOSIS — E78.5 HYPERLIPIDEMIA, UNSPECIFIED HYPERLIPIDEMIA TYPE: ICD-10-CM

## 2022-03-19 DIAGNOSIS — E11.65 UNCONTROLLED TYPE 2 DIABETES MELLITUS WITH HYPERGLYCEMIA (HCC): ICD-10-CM

## 2022-03-19 LAB
CHOLEST SERPL-MCNC: 185 MG/DL
CREAT UR-MCNC: 125 MG/DL (ref 30–125)
ERYTHROCYTE [DISTWIDTH] IN BLOOD BY AUTOMATED COUNT: 14.6 % (ref 11.6–14.5)
EST. AVERAGE GLUCOSE BLD GHB EST-MCNC: 120 MG/DL
HBA1C MFR BLD: 5.8 % (ref 4.2–5.6)
HCT VFR BLD AUTO: 38.1 % (ref 36–48)
HDLC SERPL-MCNC: 88 MG/DL (ref 40–60)
HDLC SERPL: 2.1 {RATIO} (ref 0–5)
HGB BLD-MCNC: 12.6 G/DL (ref 13–16)
LDLC SERPL CALC-MCNC: 84 MG/DL (ref 0–100)
LIPID PROFILE,FLP: ABNORMAL
MCH RBC QN AUTO: 27.7 PG (ref 24–34)
MCHC RBC AUTO-ENTMCNC: 33.1 G/DL (ref 31–37)
MCV RBC AUTO: 83.7 FL (ref 78–100)
MICROALBUMIN UR-MCNC: 0.55 MG/DL (ref 0–3)
MICROALBUMIN/CREAT UR-RTO: 4 MG/G (ref 0–30)
NRBC # BLD: 0 K/UL (ref 0–0.01)
NRBC BLD-RTO: 0 PER 100 WBC
PLATELET # BLD AUTO: 219 K/UL (ref 135–420)
PMV BLD AUTO: 10.1 FL (ref 9.2–11.8)
RBC # BLD AUTO: 4.55 M/UL (ref 4.35–5.65)
TRIGL SERPL-MCNC: 65 MG/DL (ref ?–150)
VLDLC SERPL CALC-MCNC: 13 MG/DL
WBC # BLD AUTO: 3.4 K/UL (ref 4.6–13.2)

## 2022-03-19 PROCEDURE — 82043 UR ALBUMIN QUANTITATIVE: CPT

## 2022-03-19 PROCEDURE — 36415 COLL VENOUS BLD VENIPUNCTURE: CPT

## 2022-03-19 PROCEDURE — 85027 COMPLETE CBC AUTOMATED: CPT

## 2022-03-19 PROCEDURE — 83036 HEMOGLOBIN GLYCOSYLATED A1C: CPT

## 2022-03-19 PROCEDURE — 80061 LIPID PANEL: CPT

## 2022-03-20 PROBLEM — E11.9 DIABETES MELLITUS WITHOUT COMPLICATION (HCC): Status: RESOLVED | Noted: 2022-03-17 | Resolved: 2022-03-20

## 2022-03-20 PROBLEM — H52.203 UNSPECIFIED ASTIGMATISM, BILATERAL: Status: RESOLVED | Noted: 2022-03-17 | Resolved: 2022-03-20

## 2022-03-20 PROBLEM — E66.01 SEVERE OBESITY (BMI 35.0-39.9) WITH COMORBIDITY (HCC): Status: ACTIVE | Noted: 2018-04-06

## 2022-03-20 PROBLEM — J32.9 SINUSITIS: Status: RESOLVED | Noted: 2022-03-17 | Resolved: 2022-03-20

## 2022-03-20 PROBLEM — D12.6 BENIGN NEOPLASM OF COLON: Status: RESOLVED | Noted: 2022-03-17 | Resolved: 2022-03-20

## 2022-03-20 PROBLEM — H52.4 PRESBYOPIA: Status: RESOLVED | Noted: 2022-03-17 | Resolved: 2022-03-20

## 2022-03-20 PROBLEM — E11.42 DIABETIC POLYNEUROPATHY ASSOCIATED WITH TYPE 2 DIABETES MELLITUS (HCC): Status: ACTIVE | Noted: 2022-03-20

## 2022-03-20 PROBLEM — M79.603 PAIN OF UPPER EXTREMITY: Status: RESOLVED | Noted: 2022-03-17 | Resolved: 2022-03-20

## 2022-03-20 PROBLEM — H52.10 MYOPIA: Status: RESOLVED | Noted: 2022-03-17 | Resolved: 2022-03-20

## 2022-03-24 PROBLEM — D12.6 BENIGN NEOPLASM OF COLON: Status: ACTIVE | Noted: 2022-03-17

## 2022-03-24 PROBLEM — M79.603 PAIN OF UPPER EXTREMITY: Status: ACTIVE | Noted: 2022-03-17

## 2022-03-24 PROBLEM — H52.203 UNSPECIFIED ASTIGMATISM, BILATERAL: Status: RESOLVED | Noted: 2022-03-17 | Resolved: 2022-03-20

## 2022-03-24 PROBLEM — E11.9 DIABETES MELLITUS WITHOUT COMPLICATION (HCC): Status: ACTIVE | Noted: 2022-03-17

## 2022-03-24 PROBLEM — J32.9 SINUSITIS: Status: ACTIVE | Noted: 2022-03-17

## 2022-03-24 PROBLEM — H52.10 MYOPIA: Status: RESOLVED | Noted: 2022-03-17 | Resolved: 2022-03-20

## 2022-03-24 PROBLEM — H52.4 PRESBYOPIA: Status: RESOLVED | Noted: 2022-03-17 | Resolved: 2022-03-20

## 2022-03-24 PROBLEM — E11.42 DIABETIC POLYNEUROPATHY ASSOCIATED WITH TYPE 2 DIABETES MELLITUS (HCC): Status: ACTIVE | Noted: 2022-03-20

## 2022-03-24 PROBLEM — M79.603 PAIN OF UPPER EXTREMITY: Status: RESOLVED | Noted: 2022-03-17 | Resolved: 2022-03-20

## 2022-03-24 PROBLEM — H52.10 MYOPIA: Status: ACTIVE | Noted: 2022-03-17

## 2022-03-24 PROBLEM — E11.9 DIABETES MELLITUS WITHOUT COMPLICATION (HCC): Status: RESOLVED | Noted: 2022-03-17 | Resolved: 2022-03-20

## 2022-03-24 PROBLEM — D12.6 BENIGN NEOPLASM OF COLON: Status: RESOLVED | Noted: 2022-03-17 | Resolved: 2022-03-20

## 2022-03-24 PROBLEM — H52.4 PRESBYOPIA: Status: ACTIVE | Noted: 2022-03-17

## 2022-03-24 PROBLEM — J32.9 SINUSITIS: Status: RESOLVED | Noted: 2022-03-17 | Resolved: 2022-03-20

## 2022-03-24 PROBLEM — H52.203 UNSPECIFIED ASTIGMATISM, BILATERAL: Status: ACTIVE | Noted: 2022-03-17

## 2022-04-28 DIAGNOSIS — E11.65 UNCONTROLLED TYPE 2 DIABETES MELLITUS WITH HYPERGLYCEMIA (HCC): ICD-10-CM

## 2022-04-28 DIAGNOSIS — B00.9 HSV INFECTION: ICD-10-CM

## 2022-04-29 RX ORDER — VALACYCLOVIR HYDROCHLORIDE 1 G/1
TABLET, FILM COATED ORAL
Qty: 90 TABLET | Refills: 3 | Status: SHIPPED | OUTPATIENT
Start: 2022-04-29

## 2022-04-29 RX ORDER — SITAGLIPTIN 100 MG/1
TABLET, FILM COATED ORAL
Qty: 30 TABLET | Refills: 11 | Status: SHIPPED | OUTPATIENT
Start: 2022-04-29

## 2022-07-14 LAB — HBA1C MFR BLD HPLC: 6.2 %

## 2022-08-03 ENCOUNTER — OFFICE VISIT (OUTPATIENT)
Dept: INTERNAL MEDICINE CLINIC | Age: 62
End: 2022-08-03
Payer: COMMERCIAL

## 2022-08-03 VITALS — RESPIRATION RATE: 16 BRPM | WEIGHT: 265 LBS | BODY MASS INDEX: 32.95 KG/M2 | HEIGHT: 75 IN

## 2022-08-03 DIAGNOSIS — I10 ESSENTIAL HYPERTENSION: ICD-10-CM

## 2022-08-03 DIAGNOSIS — E53.8 B12 DEFICIENCY: Primary | ICD-10-CM

## 2022-08-03 DIAGNOSIS — R79.89 ELEVATED SERUM CREATININE: ICD-10-CM

## 2022-08-03 DIAGNOSIS — E11.40 CONTROLLED TYPE 2 DIABETES MELLITUS WITH DIABETIC NEUROPATHY, WITHOUT LONG-TERM CURRENT USE OF INSULIN (HCC): ICD-10-CM

## 2022-08-03 DIAGNOSIS — E51.9 THIAMINE DEFICIENCY: ICD-10-CM

## 2022-08-03 PROBLEM — E11.42 DIABETIC POLYNEUROPATHY ASSOCIATED WITH TYPE 2 DIABETES MELLITUS (HCC): Status: RESOLVED | Noted: 2022-03-20 | Resolved: 2022-08-03

## 2022-08-03 PROCEDURE — 3044F HG A1C LEVEL LT 7.0%: CPT | Performed by: INTERNAL MEDICINE

## 2022-08-03 PROCEDURE — 99214 OFFICE O/P EST MOD 30 MIN: CPT | Performed by: INTERNAL MEDICINE

## 2022-08-03 RX ORDER — LANOLIN ALCOHOL/MO/W.PET/CERES
100 CREAM (GRAM) TOPICAL DAILY
COMMUNITY
Start: 2022-07-29

## 2022-08-03 NOTE — PROGRESS NOTES
58 y.o. male who presents for evaluation. He is here to discuss some abnormal findings found by neurology. He was seeing Dr. Nano Gordon and had blood work done, showed creatinine of 1.4 and low B1 level. Review of his chart shows GFR consistently >60 except 1 episode back several years ago. He has been on benazepril for some years. He takes otc NSAIDs except very infrequently, maybe once a month for aches.   No back or flank pain, hematuria, frothy urine, last UMAR was negative from 3/22 as below    IF 4/18    Past Medical History:   Diagnosis Date    Allergic rhinitis     BPH (benign prostatic hyperplasia)     Colon polyp 07/15/2020    Dr Octavio Burns    Deviated septum and epistaxis Dr. Shreya Arreguin 2009     DM (diabetes mellitus) ([facility] [address] 12/2015    IFG 3/10; on basis of elev a1c; neuropathy 3/22 on emg Dr Nano Gordon 7/22    ED (erectile dysfunction)     Fatty liver     on US 6/10; Fib-4 was 0.91 from 5/12    H. 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 Dayna Tolentino     Prostate cancer [facility]) 02/2019    Dr Gisela Sales bx 6/12+, psa 4.4, [place] 3+3; 2nd opinion Dr Aidan Vieira; now seeing Dr Simon Signs; s/p ext beam radiation Dr Sixto Escobedo    Sickle cell anemia [facility])      Past Surgical History:   Procedure Laterality Date    HX COLONOSCOPY      Dr. Reta Copeland 6/12 neg; Dr Octavio Burns 7/15/20 polyp    HX CYSTECTOMY      HX GI  02/2001    negative barium enema    HX MENISCUS REPAIR Left 11/2015    medial meniscus Dr Melissa Paez History     Socioeconomic History    Marital status:      Spouse name: Not on file    Number of children: 2    Years of education: Not on file    Highest education level: Not on file   Occupational History    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   Tobacco Use    Smoking status: Never    Smokeless tobacco: Never   Substance and Sexual Activity Alcohol use: No    Drug use: No    Sexual activity: Yes   Other Topics Concern    Not on file   Social History Narrative    Not on file     Social Determinants of Health     Financial Resource Strain: Not on file   Food Insecurity: Not on file   Transportation Needs: Not on file   Physical Activity: Not on file   Stress: Not on file   Social Connections: Not on file   Intimate Partner Violence: Not on file   Housing Stability: Not on file     Allergies   Allergen Reactions    Hydrochlorothiazide Other (comments)     Weight loss, pt unsure of allergy      Metformin Other (comments)     paresthesia     Current Outpatient Medications   Medication Sig    Januvia 100 mg tablet take 1 tablet by mouth once daily    valACYclovir (VALTREX) 1 gram tablet take 1 tablet by mouth once daily    benazepriL (LOTENSIN) 20 mg tablet take 1 tablet by mouth once daily    aluminum-magnesium hydroxide 200-200 mg/5 mL susp 5 mL, diphenhydrAMINE 12.5 mg/5 mL liqd 12.5 mg, lidocaine 2 % soln 5 mL 5 mL by Swish and Spit route three (3) times daily. Magic mouth wash   Maalox  Lidocaine 2% viscous   Diphenhydramine oral solution     Pharmacy to mix equal portions of ingredients to a total volume as indicated in the dispense amount. tadalafiL (CIALIS) 5 mg tablet Take 1 Tablet by mouth daily as needed for Erectile Dysfunction. atorvastatin (LIPITOR) 80 mg tablet take 1 tablet by mouth once daily    tadalafiL (CIALIS) 20 mg tablet Take 1 Tab by mouth daily as needed for Erectile Dysfunction. Indications: the inability to have an erection    fluticasone propionate (Flonase Allergy Relief) 50 mcg/actuation nasal spray 2 Sprays by Both Nostrils route daily as needed for Rhinitis or Allergies. sodium chloride (Saline Nasal Mist) 0.65 % nasal squeeze bottle 0.05 mL by Both Nostrils route as needed for Congestion. ezetimibe (ZETIA) 10 mg tablet take 1 tablet by mouth once daily    multivitamin (ONE A DAY) tablet Take 1 Tab by mouth daily. OTHER Vitamin A supplement    glucose blood VI test strips (ONETOUCH VERIO) strip Patient checks blood sugar daily, Dx E11.9    glucose blood VI test strips (ONETOUCH ULTRA TEST) strip Patient to test Blood sugar 1 a day DX: E11.65    thiamine HCL (B-1) 100 mg tablet Take 100 mg by mouth in the morning. No current facility-administered medications for this visit. REVIEW OF SYSTEMS: colo 6/12 Dr Ivan Benitez  Ophtho - no vision change or eye pain  Oral - no mouth pain, tongue or tooth problems  Ears - no hearing loss, ear pain, fullness, no swallowing problems  Cardiac - no CP, PND, orthopnea, edema, palpitations or syncope  Chest - no breast masses  Resp - no wheezing, chronic coughing, dyspnea  GI - no heartburn, nausea, vomiting, change in bowel habits, bleeding, hemorrhoids  Urinary - no dysuria, hematuria, flank pain, urgency, frequency    Visit Vitals  Resp 16   Ht 6' 3\" (1.905 m)   Wt 265 lb (120.2 kg)   BMI 33.12 kg/m²      A&O x3  Affect is appropriate. Mood stable  No apparent distress  Anicteric, no JVD, adenopathy or thyromegaly. No carotid bruits or radiated murmur  Lungs clear to auscultation, no wheezes or rales  Heart showed regular rate and rhythm. No murmur, rubs, gallops  Abdomen soft nontender, no hepatosplenomegaly or masses. Extremities without edema.   Pulses 1-2+ symmetrically  Foot exam bilaterally showed  Reflexes 2+   Vibration, proprioception, filament test dec in toes  Pulses 1-2+ DP and PT  No deformities noted  No onychomycosis    LABS  From 2/10 showed    gluc 111, cr 1.10, gfr>60, alt 51, hba1c 6.0,                   chol 278, tg 122,  hdl 56, ldl-c 198, wbc 3.2, hb 13.8, plt 230, psa 0.60  From 3/10 showed                                                  2hr GTT 88  From 6/10 showed    gluc 105, cr 1.30, gfr>60, alt 59,                                wbc 3.6, hb 13.9, plt 237  From 10/11 showed  gluc 102, cr 1.18, gfr 82,  alt 22, hba1c 6.5, wbc 3.5, hb 13.5, plt 255, psa 1.20, prl 30.8  From 11/11 showed                ldl-p 1245,                                    2 hr GTT 89  From 2/12 showed                                            ua neg  From 5/12 showed    gluc 109, cr 1.10, gfr>60, alt 17, hba1c 6.1,                   chol 224, tg 70,   hdl 70, ldl-c 140,  wbc 3.8, hb 14.3, plt 250, psa 1.08  From 10/12 showed  gluc 98,   cr 1.00, gfr>60, alt 23, hba1c 6.2, ldl-p 1647, chol 216, tg 64,   hdl 63, ldl-c 148  From 1/14 showed    gluc 99,   cr 1.30, gfr 58,  alt 31, hba1c 6.4,       chol 196, tg 81,   hdl 67, ldl-c 113  From 8/14 showed    gluc 100, cr 1.20, gfr>60, alt 19, hba1c 6.2, umar neg   chol 288, tg 140, hdl 69, ldl-c 191         From 9/14 showed                         tsh 1.84  From 2/15 showed    gluc 118, cr 1.34, gfr 68,  alt 19, hba1c 6.4, umar neg   chol 301, tg 190, hdl 63, ldl-c 200,     From 8/15 showed         hba1c 6.3,       chol 267, tg 90,   hdl 70, ldl-c 179  From 12/15 showed  gluc 102, cr 1.29, gfr>60,     hba1c 6.8, umar neg  From 3/16 showed    gluc 96,   cr 1.23, gfr>60,                     wbc 4.1, hb 13.3, plt 240  From 10/16 showed  gluc 110, cr 1.13, gfr>60, alt 29, hba1c 6.4,       chol 233, tg 120, hdl 73, ldl-c 136  From 9/17 showed    gluc 99,   cr 1.25, gfr>60, alt 29, hba1c 6.5, umar neg,  chol 240, tg 93,   hdl 79, ldl-c 142, wbc 3.9, hb 14.4, plt 217, psa 3.80  From 3/18 showed    gluc 103, cr 1.30, gfr>60, alt 27, hba1c 6.3,       chol 251, tg 96,   hdl 88, ldl-c 144,  tsh 2.69,                    psa 3.70, b12 559, fol 14.1, free t4 1.20, hiv neg, rpr neg, hep b/c neg  From 1/19 showed                           umar neg   chol 156, tg 47,   hdl 99, ldl-c 48,           From 2/19 showed                    hba1c 7.0,                                 hep b/c-, hiv neg, tpall neg  From 7/19 showed    gluc 103, cr 1.38, gfr>60  From 2/20 showed    gluc 92,   cr 1.22, gfr>60, alt 30, hba1c 6.3, umar neg,  chol 160, tg 42,   hdl 91, ldl-c 61,   wbc 3.8, hb 13.7, plt 217  From 3/20 showed    gluc 90,   cr 1.30, gfr>60,                   psa 3.61 ferritin 283, b12 477, fol 11.9  From 8/20 showed    gluc 91,   cr 1.29, gfr>60,                   psa 4.80  From 2/21 showed    gluc 91,   cr 1.09, gfr>60, alt 25, hba1c 6.1, umar 5,      chol 183, tg 49,   hdl 93, ldl-c 80,   wbc 3.6, hb 13.6, plt 230  From 8/21 showed    gluc 97,   cr 1.19, gfr>60, alt 24, hba1c 6.0  Form 2/22 showed                        psa 0.26, test 591  From 3/22 showed         hba1c 5.8, umar 4,       chol 185, tg 65,   hdl 88, ldl-c 84,   wbc 3.4, hb 12.6, plt 219, psa 1.40  From 7/22 showed      cr 1.40, gfr 55, alt 29, hba1c 6.2,             b12 605, fol 9.2, mma 209, b1 49.7, b6 30.9, rpr neg, tocopherol nl     We reviewed the patient's labs from the last several visits to point out trends in the numbers            Patient Active Problem List   Diagnosis Code    Prolactin microadenoma Dr. Oanh Montesinos D35.2    Hyperlipidemia E78.5    Essential hypertension I10    Controlled type 2 diabetes mellitus, without long-term current use of insulin ([address] E11.9    Erectile dysfunction N52.9    Severe obesity (BMI 35.0-39. 9) with comorbidity ([address] E66.01    Prostate cancer ([facility] [address] C61     Assessment and plan:  1. Ortho. F/U Dr Lindy Jasso  2. Obesity. Lifestyle and dietary measures, previously declined appetite supp. Trying to stick with IF  3. DM. Controlled on current  4. Erectile dysfunction. Prn cialis  5. Prolactinoma. Check prl next draw  6. HLP. At target on lipitor and zetia  7. HTN. Controlled on lotensin; continue for now pending evaluation in 2 weeks  8. Prostate ca. Per urology    NEW ISSUES  9. Rising cr and gfr dec. Recheck in 2 weeks and proceed with evaluation as indicated  10. B1 def. supplement      RTC 9/22    Above conditions discussed at length and patient vocalized understanding.   All questions answered to patient satisfaction        ICD-10-CM ICD-9-CM    1. B12 deficiency  E53.8 266.2       2. Thiamine deficiency  E51.9 265.1       3. Elevated serum creatinine  R79.89 790.99       4. Essential hypertension  I10 401.9       5.  Controlled type 2 diabetes mellitus with diabetic neuropathy, without long-term current use of insulin (HCC)  E11.40 250.60      357.2

## 2022-08-03 NOTE — PROGRESS NOTES
Liban Ybarra presents with   Chief Complaint   Patient presents with    Abnormal Lab Results     Neuro told him his kidney(s) were not functioning correctly           1. \"Have you been to the ER, urgent care clinic since your last visit? Hospitalized since your last visit? \" No    2. \"Have you seen or consulted any other health care providers outside of the [address] [place] [address] since your last visit? \"  Dr. Razia Vera 7-14-22      3. For patients aged 39-70: Has the patient had a colonoscopy / FIT/ Cologuard?  Yes - no Care Gap present

## 2022-08-17 ENCOUNTER — APPOINTMENT (OUTPATIENT)
Dept: INTERNAL MEDICINE CLINIC | Age: 62
End: 2022-08-17

## 2022-08-17 ENCOUNTER — HOSPITAL ENCOUNTER (OUTPATIENT)
Dept: LAB | Age: 62
Discharge: HOME | End: 2022-08-17
Payer: COMMERCIAL

## 2022-08-17 DIAGNOSIS — R79.89 ELEVATED SERUM CREATININE: ICD-10-CM

## 2022-08-17 LAB
ANION GAP SERPL CALC-SCNC: 5 MMOL/L (ref 3–18)
APPEARANCE UR: CLEAR
BILIRUB UR QL: NEGATIVE
BUN SERPL-MCNC: 17 MG/DL (ref 7–18)
BUN/CREAT SERPL: 14 (ref 12–20)
CALCIUM SERPL-MCNC: 9.6 MG/DL (ref 8.5–10.1)
CHLORIDE SERPL-SCNC: 105 MMOL/L (ref 100–111)
CO2 SERPL-SCNC: 28 MMOL/L (ref 21–32)
COLOR UR: YELLOW
CREAT SERPL-MCNC: 1.22 MG/DL (ref 0.6–1.3)
GLUCOSE SERPL-MCNC: 86 MG/DL (ref 74–99)
GLUCOSE UR STRIP.AUTO-MCNC: NEGATIVE MG/DL
HGB UR QL STRIP: NEGATIVE
KETONES UR QL STRIP.AUTO: NEGATIVE MG/DL
LEUKOCYTE ESTERASE UR QL STRIP.AUTO: NEGATIVE
NITRITE UR QL STRIP.AUTO: NEGATIVE
PH UR STRIP: 5.5 [PH] (ref 5–8)
POTASSIUM SERPL-SCNC: 3.9 MMOL/L (ref 3.5–5.5)
PROT UR STRIP-MCNC: NEGATIVE MG/DL
SODIUM SERPL-SCNC: 138 MMOL/L (ref 136–145)
SP GR UR REFRACTOMETRY: 1.02 (ref 1–1.03)
UROBILINOGEN UR QL STRIP.AUTO: 0.2 EU/DL (ref 0.2–1)

## 2022-08-17 PROCEDURE — 80048 BASIC METABOLIC PNL TOTAL CA: CPT

## 2022-08-17 PROCEDURE — 81003 URINALYSIS AUTO W/O SCOPE: CPT

## 2022-08-17 PROCEDURE — 36415 COLL VENOUS BLD VENIPUNCTURE: CPT

## 2022-08-22 ENCOUNTER — TELEPHONE (OUTPATIENT)
Dept: INTERNAL MEDICINE CLINIC | Age: 62
End: 2022-08-22

## 2022-08-22 NOTE — TELEPHONE ENCOUNTER
Pls call    Results for orders placed or performed during the hospital encounter of 19/04/37   METABOLIC PANEL, BASIC   Result Value Ref Range    Sodium 138 136 - 145 mmol/L    Potassium 3.9 3.5 - 5.5 mmol/L    Chloride 105 100 - 111 mmol/L    CO2 28 21 - 32 mmol/L    Anion gap 5 3.0 - 18 mmol/L    Glucose 86 74 - 99 mg/dL    BUN 17 7.0 - 18 MG/DL    Creatinine 1.22 0.6 - 1.3 MG/DL    BUN/Creatinine ratio 14 12 - 20      GFR est AA >60 >60 ml/min/1.73m2    GFR est non-AA >60 >60 ml/min/1.73m2    Calcium 9.6 8.5 - 10.1 MG/DL   URINALYSIS W/ RFLX MICROSCOPIC   Result Value Ref Range    Color YELLOW      Appearance CLEAR      Specific gravity 1.019 1.005 - 1.030      pH (UA) 5.5 5.0 - 8.0      Protein Negative NEG mg/dL    Glucose Negative NEG mg/dL    Ketone Negative NEG mg/dL    Bilirubin Negative NEG      Blood Negative NEG      Urobilinogen 0.2 0.2 - 1.0 EU/dL    Nitrites Negative NEG      Leukocyte Esterase Negative NEG       Creat normal, UA neg

## 2022-09-30 ENCOUNTER — TELEPHONE (OUTPATIENT)
Dept: INTERNAL MEDICINE CLINIC | Age: 62
End: 2022-09-30

## 2022-09-30 DIAGNOSIS — R79.89 ELEVATED SERUM CREATININE: Primary | ICD-10-CM

## 2022-09-30 NOTE — TELEPHONE ENCOUNTER
HPI and Plan:   See dictation    Orders Placed This Encounter   Procedures     Follow-Up with Cardiac Advanced Practice Provider       Orders Placed This Encounter   Medications     atorvastatin (LIPITOR) 10 MG tablet     Sig: Take 1 tablet (10 mg) by mouth daily     Dispense:  30 tablet     Refill:  3       There are no discontinued medications.      Encounter Diagnoses   Name Primary?     Coronary artery disease involving native coronary artery of native heart without angina pectoris      Pre-operative cardiovascular examination Yes       CURRENT MEDICATIONS:  Current Outpatient Prescriptions   Medication Sig Dispense Refill     atorvastatin (LIPITOR) 10 MG tablet Take 1 tablet (10 mg) by mouth daily 30 tablet 3     SYMBICORT 160-4.5 MCG/ACT Inhaler INHALE TWO PUFFS BY MOUTH TWICE A DAY 1 Inhaler 8     metoprolol (TOPROL-XL) 25 MG 24 hr tablet Take 0.5 tablets (12.5 mg) by mouth daily 30 tablet 2     albuterol (PROAIR HFA/PROVENTIL HFA/VENTOLIN HFA) 108 (90 BASE) MCG/ACT Inhaler Inhale 2 puffs into the lungs every 6 hours as needed for shortness of breath / dyspnea or wheezing 3 Inhaler 1     montelukast (SINGULAIR) 10 MG tablet TAKE ONE TABLET BY MOUTH AT BEDTIME 90 tablet 2     ipratropium - albuterol 0.5 mg/2.5 mg/3 mL (DUONEB) 0.5-2.5 (3) MG/3ML neb solution Take 1 vial (3 mLs) by nebulization every 6 hours as needed for shortness of breath / dyspnea or wheezing 90 vial 1     Turmeric Curcumin 500 MG CAPS Take 250 mg by mouth daily (with breakfast)       Coenzyme Q10 (CO Q 10) 100 MG CAPS Take 100 mg by mouth daily       ASPIRIN PO Take 325 mg by mouth daily       SPIRIVA HANDIHALER 18 MCG inhalation capsule USING THE HANDIHALER, INHALE THE CONTENTS OF ONE CAPSULE DAILY 90 capsule 5     nicotine (NICODERM CQ) 21 MG/24HR patch 2h hr Place 1 patch onto the skin every 24 hours 30 patch 0     nicotine (NICODERM CQ) 14 MG/24HR patch 2h hr Place 1 patch onto the skin every 24 hours 30 patch      nicotine (NICODERM  ordered CQ) 7 MG/24HR patch 2h hr Place 1 patch onto the skin every 24 hours 30 patch      albuterol (PROAIR HFA, PROVENTIL HFA, VENTOLIN HFA) 108 (90 BASE) MCG/ACT inhaler Inhale 2 puffs into the lungs every 6 hours as needed for shortness of breath / dyspnea 1 Inhaler 0     SYMBICORT 160-4.5 MCG/ACT inhaler INHALE TWO PUFFS BY MOUTH TWICE A DAY 1 Inhaler 5     calcium carbonate (OS-CALDERON 500 MG [place]. [place]) 500 MG tablet Take 1,200 mg by mouth daily       Cholecalciferol (VITAMIN D) 1000 UNITS capsule Take 1,000 Units by mouth daily         ALLERGIES     Allergies   Allergen Reactions     Crestor [Rosuvastatin] Other (See Comments)     dizziness     Hmg-Coa-R Inhibitors Other (See Comments)     Muscle/joint aching     Lisinopril Cough     Penicillins      Optison [Albumin Human] Other (See Comments)     Pt was flush and very dizzy.  Also had a BP drop       PAST MEDICAL HISTORY:  Past Medical History   Diagnosis Date     COPD (chronic obstructive pulmonary disease) (H)      CVA (cerebral vascular accident) (H) 8-       PAST SURGICAL HISTORY:  Past Surgical History   Procedure Laterality Date     Salpingo oophorectomy,r/l/delores       Salpingo Oophorectomy, RT/LT/DELORES     Appendectomy         FAMILY HISTORY:  Family History   Problem Relation Age of Onset     CEREBROVASCULAR DISEASE Mother      CEREBROVASCULAR DISEASE Father      Genitourinary Problems Brother      Dialysis       SOCIAL HISTORY:  Social History     Social History     Marital status:      Spouse name: N/A     Number of children: N/A     Years of education: N/A     Social History Main Topics     Smoking status: Current Every Day Smoker     Packs/day: 0.50     Types: Cigarettes     Smokeless tobacco: Never Used      Comment: patient states she is working on it      Alcohol use No     Drug use: No     Sexual activity: Not Currently     Partners: Male     Other Topics Concern      Service No     Blood Transfusions No     Caffeine Concern No      "Occupational Exposure No     Hobby Hazards No     Sleep Concern Yes     Trouble breathing at night due to COPD     Stress Concern No     Weight Concern No     Special Diet No     Back Care No     Exercise No     Bike Helmet No     Seat Belt Yes     Self-Exams Yes     Parent/Sibling W/ Cabg, Mi Or Angioplasty Before 65f 55m? No     Social History Narrative       Review of Systems:  Skin:  Negative       Eyes:  Positive for glasses    ENT:  Negative      Respiratory:  Positive for shortness of breath;cough COPD  on inhalers   Cardiovascular:  Negative      Gastroenterology: Negative      Genitourinary:  Negative      Musculoskeletal:  Negative      Neurologic:  Negative      Psychiatric:  Negative      Heme/Lymph/Imm:  Negative      Endocrine:  Negative        Physical Exam:  Vitals: /64  Pulse 80  Ht 1.575 m (5' 2\")  Wt 52.2 kg (115 lb)  BMI 21.03 kg/m2    Constitutional:  cooperative thin      Skin:  warm and dry to the touch        Head:  normocephalic        Eyes:           ENT:    poor dentition      Neck:  JVP normal        Chest:  clear to auscultation          Cardiac: regular rhythm;normal S1 and S2       systolic ejection murmur;grade 1          Abdomen:  abdomen soft;no masses;non-tender        Vascular:   pulses below the femoral arteries are diminished                                      Extremities and Back:  no edema              Neurological:  no gross motor deficits              CC  Avelina Miramontes MD  [facility]  [address] [place] [address]              "

## 2022-09-30 NOTE — TELEPHONE ENCOUNTER
Called pt to reschedule todays appt which was cancelled due to weather. Says he went to [facility] for labs and they said no lab orders were in chart. Please enter and call pt when done so he can go get them drawn.

## 2022-10-03 NOTE — PROGRESS NOTES
58 y.o. male who presents for evaluation. Continues to follow up w urology for the prostate ca for which he received radiation tx. He was having some brbpr so she was referred to GI and reports that he is scheduled for colo early Nov     Denies any cardiovascular complaints. No set exercise but he is active with work. Denied any gi complaints     Denies polyuria, polydipsia, nocturia, vision change. Not checking sugars at this time.  He was dx'ed wih probable early neuropathy and b1 def by Dr Amadeo Lackey    IF 4/18    Past Medical History:   Diagnosis Date    Allergic rhinitis     BPH (benign prostatic hyperplasia)     Colon polyp 07/15/2020    Dr Armando Posey    Deviated septum and epistaxis Dr. Ernestine Parker 2009     DM (diabetes mellitus) ([facility] [address] 12/2015    IFG 3/10; on basis of elev a1c; neuropathy 3/22 on emg Dr Amadeo Lackey 7/22    ED (erectile dysfunction)     Fatty liver     on US 6/10; Fib-4 was 0.91 from 5/12    H. 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 Anita Longoria     Prostate cancer [facility]) 02/2019    Dr Shena Churchill bx 6/12+, psa 4.4, [place] 3+3; 2nd opinion Dr Kael Sow; now seeing Dr WINKLER; s/p ext beam radiation Dr Keara Thomas    Sickle cell anemia [facility])     Thiamine deficiency 03/2022    Dr Amadeo Lackey     Past Surgical History:   Procedure Laterality Date    HX COLONOSCOPY      Dr. Heather Bergeron 6/12 neg; Dr Armando Posey 7/15/20 polyp    HX CYSTECTOMY      HX GI  02/2001    negative barium enema    HX MENISCUS REPAIR Left 11/2015    medial meniscus Dr Dean Martin History     Socioeconomic History    Marital status:      Spouse name: Not on file    Number of children: 2    Years of education: Not on file    Highest education level: Not on file   Occupational History    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   Tobacco Use    Smoking status: Never Smokeless tobacco: Never   Substance and Sexual Activity    Alcohol use: No    Drug use: No    Sexual activity: Yes   Other Topics Concern    Not on file   Social History Narrative    Not on file     Social Determinants of Health     Financial Resource Strain: Not on file   Food Insecurity: Not on file   Transportation Needs: Not on file   Physical Activity: Not on file   Stress: Not on file   Social Connections: Not on file   Intimate Partner Violence: Not on file   Housing Stability: Not on file     Allergies   Allergen Reactions    Hydrochlorothiazide Other (comments)     Weight loss, pt unsure of allergy      Metformin Other (comments)     paresthesia     Current Outpatient Medications   Medication Sig    polyethylene glycol (MIRALAX) 17 gram/dose powder Take 17 g by mouth daily. thiamine HCL (B-1) 100 mg tablet Take 100 mg by mouth in the morning. Januvia 100 mg tablet take 1 tablet by mouth once daily    valACYclovir (VALTREX) 1 gram tablet take 1 tablet by mouth once daily    benazepriL (LOTENSIN) 20 mg tablet take 1 tablet by mouth once daily    aluminum-magnesium hydroxide 200-200 mg/5 mL susp 5 mL, diphenhydrAMINE 12.5 mg/5 mL liqd 12.5 mg, lidocaine 2 % soln 5 mL 5 mL by Swish and Spit route three (3) times daily. Magic mouth wash   Maalox  Lidocaine 2% viscous   Diphenhydramine oral solution     Pharmacy to mix equal portions of ingredients to a total volume as indicated in the dispense amount. tadalafiL (CIALIS) 5 mg tablet Take 1 Tablet by mouth daily as needed for Erectile Dysfunction. atorvastatin (LIPITOR) 80 mg tablet take 1 tablet by mouth once daily    tadalafiL (CIALIS) 20 mg tablet Take 1 Tab by mouth daily as needed for Erectile Dysfunction. Indications: the inability to have an erection    fluticasone propionate (Flonase Allergy Relief) 50 mcg/actuation nasal spray 2 Sprays by Both Nostrils route daily as needed for Rhinitis or Allergies.     sodium chloride (Saline Nasal Mist) 0.65 % nasal squeeze bottle 0.05 mL by Both Nostrils route as needed for Congestion. ezetimibe (ZETIA) 10 mg tablet take 1 tablet by mouth once daily    multivitamin (ONE A DAY) tablet Take 1 Tab by mouth daily. OTHER Vitamin A supplement    glucose blood VI test strips (ONETOUCH VERIO) strip Patient checks blood sugar daily, Dx E11.9    glucose blood VI test strips (ONETOUCH ULTRA TEST) strip Patient to test Blood sugar 1 a day DX: E11.65     No current facility-administered medications for this visit. REVIEW OF SYSTEMS: colo 6/12 Dr Mae Alvarez  Ophtho - no vision change or eye pain  Oral - no mouth pain, tongue or tooth problems  Ears - no hearing loss, ear pain, fullness, no swallowing problems  Cardiac - no CP, PND, orthopnea, edema, palpitations or syncope  Chest - no breast masses  Resp - no wheezing, chronic coughing, dyspnea  GI - no heartburn, nausea, vomiting, change in bowel habits, bleeding, hemorrhoids  Urinary - no dysuria, hematuria, flank pain, urgency, frequency    Visit Vitals  /83   Pulse 81   Temp 97.3 °F (36.3 °C) (Temporal)   Resp 16   Ht 6' 3\" (1.905 m)   Wt 260 lb (117.9 kg)   SpO2 100%   BMI 32.50 kg/m²        A&O x3  Affect is appropriate. Mood stable  No apparent distress  Anicteric, no JVD, adenopathy or thyromegaly. No carotid bruits or radiated murmur  Lungs clear to auscultation, no wheezes or rales  Heart showed regular rate and rhythm. No murmur, rubs, gallops  Abdomen soft nontender, no hepatosplenomegaly or masses. Extremities without edema.   Pulses 1-2+ symmetrically  Foot exam bilaterally showed  Reflexes 2+   Vibration, proprioception, filament test dec in toes  Pulses 1-2+ DP and PT  No deformities noted  No onychomycosis    LABS  From 2/10 showed    gluc 111, cr 1.10, gfr>60, alt 51, hba1c 6.0,                   chol 278, tg 122,  hdl 56, ldl-c 198, wbc 3.2, hb 13.8, plt 230, psa 0.60  From 3/10 showed                                                  2hr GTT 88  From 6/10 showed    gluc 105, cr 1.30, gfr>60, alt 59,                                wbc 3.6, hb 13.9, plt 237  From 10/11 showed  gluc 102, cr 1.18, gfr 82,  alt 22, hba1c 6.5,                               wbc 3.5, hb 13.5, plt 255, psa 1.20, prl 30.8  From 11/11 showed                ldl-p 1245,                                    2 hr GTT 89  From 2/12 showed                                            ua neg  From 5/12 showed    gluc 109, cr 1.10, gfr>60, alt 17, hba1c 6.1,                   chol 224, tg 70,   hdl 70, ldl-c 140,  wbc 3.8, hb 14.3, plt 250, psa 1.08  From 10/12 showed  gluc 98,   cr 1.00, gfr>60, alt 23, hba1c 6.2, ldl-p 1647, chol 216, tg 64,   hdl 63, ldl-c 148  From 1/14 showed    gluc 99,   cr 1.30, gfr 58,  alt 31, hba1c 6.4,       chol 196, tg 81,   hdl 67, ldl-c 113  From 8/14 showed    gluc 100, cr 1.20, gfr>60, alt 19, hba1c 6.2, umar neg   chol 288, tg 140, hdl 69, ldl-c 191         From 9/14 showed                         tsh 1.84  From 2/15 showed    gluc 118, cr 1.34, gfr 68,  alt 19, hba1c 6.4, umar neg   chol 301, tg 190, hdl 63, ldl-c 200,     From 8/15 showed         hba1c 6.3,       chol 267, tg 90,   hdl 70, ldl-c 179  From 12/15 showed  gluc 102, cr 1.29, gfr>60,     hba1c 6.8, umar neg  From 3/16 showed    gluc 96,   cr 1.23, gfr>60,                     wbc 4.1, hb 13.3, plt 240  From 10/16 showed  gluc 110, cr 1.13, gfr>60, alt 29, hba1c 6.4,       chol 233, tg 120, hdl 73, ldl-c 136  From 9/17 showed    gluc 99,   cr 1.25, gfr>60, alt 29, hba1c 6.5, umar neg,  chol 240, tg 93,   hdl 79, ldl-c 142, wbc 3.9, hb 14.4, plt 217, psa 3.80  From 3/18 showed    gluc 103, cr 1.30, gfr>60, alt 27, hba1c 6.3,       chol 251, tg 96,   hdl 88, ldl-c 144, tsh 2.69,                    psa 3.70, b12 559, fol 14.1, free t4 1.20, hiv neg, rpr neg, hep b/c-  From 1/19 showed                           umar neg   chol 156, tg 47,   hdl 99, ldl-c 48,           From 2/19 showed hba1c 7.0,                                            hep b/c-, hiv neg, tpall neg  From 7/19 showed    gluc 103, cr 1.38, gfr>60  From 2/20 showed    gluc 92,   cr 1.22, gfr>60, alt 30, hba1c 6.3, umar neg,  chol 160, tg 42,   hdl 91, ldl-c 61,   wbc 3.8, hb 13.7, plt 217  From 3/20 showed    gluc 90,   cr 1.30, gfr>60,                   psa 3.61   b12 477, fol 11.9f, ferritin 283,   From 8/20 showed    gluc 91,   cr 1.29, gfr>60,                   psa 4.80  From 2/21 showed    gluc 91,   cr 1.09, gfr>60, alt 25, hba1c 6.1, umar 5,      chol 183, tg 49,   hdl 93, ldl-c 80,   wbc 3.6, hb 13.6, plt 230  From 8/21 showed    gluc 97,   cr 1.19, gfr>60, alt 24, hba1c 6.0  Form 2/22 showed                       psa 0.26, test 591  From 3/22 showed         hba1c 5.8, umar 4,       chol 185, tg 65,   hdl 88, ldl-c 84,  wbc 3.4, hb 12.6, plt 219, psa 1.40  From 7/22 showed      cr 1.40, gfr 55, alt 29, hba1c 6.2,                      b12 605, fol 9.2, mma 209, b1 49.7, b6 30.9, rpr neg, tocopherol nl     We reviewed the patient's labs from the last several visits to point out trends in the numbers        Patient Active Problem List   Diagnosis Code    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 9) with comorbidity (HCC) E66.01    Prostate cancer ([facility] [address] C61    Pain of upper arm M79.629     Assessment and plan:  1. Ortho. F/U Dr Kayli Campa  2. Obesity. Lifestyle and dietary measures, previously declined appetite supp. He is down about 30 lbs from peak! 3.  DM. Controlled on januvia. Discussed possibly changing to glp or sglt2 including risks and benefits,. He wants to hold off for now  4. Erectile dysfunction. Prn cialis  5. Prolactinoma. Check prl next draw  6. HLP. At target on lipitor and zetia  7. HTN.  Controlled on lotensin  8. Prostate ca. Per urology  9. B1 def. supplement  10. R/o radiation colitis? [place] in early Nov        RTC 4/23    Above conditions discussed at length and patient vocalized understanding. All questions answered to patient satisfaction        ICD-10-CM ICD-9-CM    1. Controlled type 2 diabetes mellitus with diabetic neuropathy, without long-term current use of insulin (HCC)  E11.40 250.60 CBC W/O DIFF     875.0 METABOLIC PANEL, COMPREHENSIVE      MICROALBUMIN, UR, RAND W/ MICROALB/CREAT RATIO      LIPID PANEL      HEMOGLOBIN A1C WITH EAG      2. Essential hypertension  I10 401.9       3. Hyperlipidemia, unspecified hyperlipidemia type  E78.5 272.4       4.  Prostate cancer ([facility] [address]  C61 185

## 2022-10-04 ENCOUNTER — TRANSCRIBE ORDER (OUTPATIENT)
Dept: RADIATION THERAPY | Age: 62
End: 2022-10-04

## 2022-10-04 DIAGNOSIS — C61 PROSTATE CANCER (HCC): Primary | ICD-10-CM

## 2022-10-08 ENCOUNTER — HOSPITAL ENCOUNTER (OUTPATIENT)
Dept: LAB | Age: 62
Discharge: HOME | End: 2022-10-08
Payer: COMMERCIAL

## 2022-10-08 DIAGNOSIS — R79.89 ELEVATED SERUM CREATININE: ICD-10-CM

## 2022-10-08 LAB
ANION GAP SERPL CALC-SCNC: 6 MMOL/L (ref 3–18)
APPEARANCE UR: CLEAR
BILIRUB UR QL: NEGATIVE
BUN SERPL-MCNC: 13 MG/DL (ref 7–18)
BUN/CREAT SERPL: 11 (ref 12–20)
CALCIUM SERPL-MCNC: 9.3 MG/DL (ref 8.5–10.1)
CHLORIDE SERPL-SCNC: 103 MMOL/L (ref 100–111)
CO2 SERPL-SCNC: 29 MMOL/L (ref 21–32)
COLOR UR: YELLOW
CREAT SERPL-MCNC: 1.19 MG/DL (ref 0.6–1.3)
GLUCOSE SERPL-MCNC: 104 MG/DL (ref 74–99)
GLUCOSE UR STRIP.AUTO-MCNC: NEGATIVE MG/DL
HGB UR QL STRIP: NEGATIVE
KETONES UR QL STRIP.AUTO: NEGATIVE MG/DL
LEUKOCYTE ESTERASE UR QL STRIP.AUTO: NEGATIVE
NITRITE UR QL STRIP.AUTO: NEGATIVE
PH UR STRIP: 6 [PH] (ref 5–8)
POTASSIUM SERPL-SCNC: 4.5 MMOL/L (ref 3.5–5.5)
PROT UR STRIP-MCNC: NEGATIVE MG/DL
SODIUM SERPL-SCNC: 138 MMOL/L (ref 136–145)
SP GR UR REFRACTOMETRY: 1.01 (ref 1–1.03)
UROBILINOGEN UR QL STRIP.AUTO: 0.2 EU/DL (ref 0.2–1)

## 2022-10-08 PROCEDURE — 84403 ASSAY OF TOTAL TESTOSTERONE: CPT

## 2022-10-08 PROCEDURE — 81003 URINALYSIS AUTO W/O SCOPE: CPT

## 2022-10-08 PROCEDURE — 80048 BASIC METABOLIC PNL TOTAL CA: CPT

## 2022-10-08 PROCEDURE — 36415 COLL VENOUS BLD VENIPUNCTURE: CPT

## 2022-10-09 LAB — TESTOST SERPL-MCNC: 432 NG/DL (ref 264–916)

## 2022-10-11 PROBLEM — M79.629 PAIN OF UPPER ARM: Status: ACTIVE | Noted: 2022-03-17

## 2022-10-14 ENCOUNTER — HOSPITAL ENCOUNTER (OUTPATIENT)
Dept: RADIATION THERAPY | Age: 62
Discharge: HOME | End: 2022-10-14
Payer: COMMERCIAL

## 2022-10-14 PROCEDURE — 99213 OFFICE O/P EST LOW 20 MIN: CPT | Performed by: RADIOLOGY

## 2022-10-14 PROCEDURE — 99211 OFF/OP EST MAY X REQ PHY/QHP: CPT

## 2022-10-17 ENCOUNTER — OFFICE VISIT (OUTPATIENT)
Dept: INTERNAL MEDICINE CLINIC | Age: 62
End: 2022-10-17
Payer: COMMERCIAL

## 2022-10-17 VITALS
OXYGEN SATURATION: 100 % | HEART RATE: 81 BPM | TEMPERATURE: 97.3 F | BODY MASS INDEX: 32.33 KG/M2 | HEIGHT: 75 IN | DIASTOLIC BLOOD PRESSURE: 83 MMHG | WEIGHT: 260 LBS | SYSTOLIC BLOOD PRESSURE: 136 MMHG | RESPIRATION RATE: 16 BRPM

## 2022-10-17 DIAGNOSIS — C61 PROSTATE CANCER (HCC): ICD-10-CM

## 2022-10-17 DIAGNOSIS — Z23 NEEDS FLU SHOT: ICD-10-CM

## 2022-10-17 DIAGNOSIS — E78.5 HYPERLIPIDEMIA, UNSPECIFIED HYPERLIPIDEMIA TYPE: ICD-10-CM

## 2022-10-17 DIAGNOSIS — E11.40 CONTROLLED TYPE 2 DIABETES MELLITUS WITH DIABETIC NEUROPATHY, WITHOUT LONG-TERM CURRENT USE OF INSULIN (HCC): Primary | ICD-10-CM

## 2022-10-17 DIAGNOSIS — I10 ESSENTIAL HYPERTENSION: ICD-10-CM

## 2022-10-17 PROCEDURE — 3044F HG A1C LEVEL LT 7.0%: CPT | Performed by: INTERNAL MEDICINE

## 2022-10-17 PROCEDURE — 99214 OFFICE O/P EST MOD 30 MIN: CPT | Performed by: INTERNAL MEDICINE

## 2022-10-17 PROCEDURE — 90686 IIV4 VACC NO PRSV 0.5 ML IM: CPT | Performed by: INTERNAL MEDICINE

## 2022-10-17 PROCEDURE — 90471 IMMUNIZATION ADMIN: CPT | Performed by: INTERNAL MEDICINE

## 2022-10-17 NOTE — PROGRESS NOTES
Grullonselena Chavezgle presents with   Chief Complaint   Patient presents with    Follow-up     6 month    Cholesterol Problem    Hypertension    Diabetes    Labs     10-8-22                1. \"Have you been to the ER, urgent care clinic since your last visit? Hospitalized since your last visit? \"  Jose Castro on 8-13-22    2. \"Have you seen or consulted any other health care providers outside of the [address] [place] [address] since your last visit? \"  Toño Nair for ER      3. For patients aged 39-70: Has the patient had a colonoscopy / FIT/ Cologuard?  Yes - no Care Gap present

## 2022-10-28 NOTE — PATIENT INSTRUCTIONS
Vaccine Information Statement    Influenza (Flu) Vaccine (Inactivated or Recombinant): What You Need to Know    Many vaccine information statements are available in Serbian and other languages. See www.immunize.org/vis. Hojas de información sobre vacunas están disponibles en español y en muchos otros idiomas. Visite www.immunize.org/vis. 1. Why get vaccinated? Influenza vaccine can prevent influenza (flu). Flu is a contagious disease that spreads around the [facility] every year, usually between October and May. Anyone can get the flu, but it is more dangerous for some people. Infants and young children, people 72 years and older, pregnant people, and people with certain health conditions or a weakened immune system are at greatest risk of flu complications. Pneumonia, bronchitis, sinus infections, and ear infections are examples of flu-related complications. If you have a medical condition, such as heart disease, cancer, or diabetes, flu can make it worse. Flu can cause fever and chills, sore throat, muscle aches, fatigue, cough, headache, and runny or stuffy nose. Some people may have vomiting and diarrhea, though this is more common in children than adults. In an average year, thousands of people in the [facility] die from flu, and many more are hospitalized. Flu vaccine prevents millions of illnesses and flu-related visits to the doctor each year. 2. Influenza vaccines     CDC recommends everyone 6 months and older get vaccinated every flu season. Children 6 months through 6years of age may need 2 doses during a single flu season. Everyone else needs only 1 dose each flu season. It takes about 2 weeks for protection to develop after vaccination. There are many flu viruses, and they are always changing. Each year a new flu vaccine is made to protect against the influenza viruses believed to be likely to cause disease in the upcoming flu season.  Even when the vaccine doesnt exactly match these viruses, it may still provide some protection. Influenza vaccine does not cause flu. Influenza vaccine may be given at the same time as other vaccines. 3. Talk with your health care provider    Tell your vaccination provider if the person getting the vaccine:  Has had an allergic reaction after a previous dose of influenza vaccine, or has any severe, life-threatening allergies   Has ever had Guillain-Barré Syndrome (also called GBS)    In some cases, your health care provider may decide to postpone influenza vaccination until a future visit. Influenza vaccine can be administered at any time during pregnancy. People who are or will be pregnant during influenza season should receive inactivated influenza vaccine. People with minor illnesses, such as a cold, may be vaccinated. People who are moderately or severely ill should usually wait until they recover before getting influenza vaccine. Your health care provider can give you more information. 4. Risks of a vaccine reaction    Soreness, redness, and swelling where the shot is given, fever, muscle aches, and headache can happen after influenza vaccination. There may be a very small increased risk of Guillain-Barré Syndrome (GBS) after inactivated influenza vaccine (the flu shot). [place] children who get the flu shot along with pneumococcal vaccine (PCV13) and/or DTaP vaccine at the same time might be slightly more likely to have a seizure caused by fever. Tell your health care provider if a child who is getting flu vaccine has ever had a seizure. People sometimes faint after medical procedures, including vaccination. Tell your provider if you feel dizzy or have vision changes or ringing in the ears. As with any medicine, there is a very remote chance of a vaccine causing a severe allergic reaction, other serious injury, or death. 5. What if there is a serious problem?     An allergic reaction could occur after the vaccinated person leaves the clinic. If you see signs of a severe allergic reaction (hives, swelling of the face and throat, difficulty breathing, a fast heartbeat, dizziness, or weakness), call 9-1-1 and get the person to the nearest hospital.    For other signs that concern you, call your health care provider. Adverse reactions should be reported to the Vaccine Adverse Event Reporting System (VAERS). Your health care provider will usually file this report, or you can do it yourself. Visit the VAERS website at www.vaers. [facility].gov or call 6-997.277.1963. VAERS is only for reporting reactions, and VAERS staff members do not give medical advice. 6. The National Vaccine Injury Compensation Program    The [facility] Vaccine Injury Compensation Program (VICP) is a federal program that was created to compensate people who may have been injured by certain vaccines. Claims regarding alleged injury or death due to vaccination have a time limit for filing, which may be as short as two years. Visit the VICP website at www.[facility].gov/vaccinecompensation or call 1-385.242.1653 to learn about the program and about filing a claim. 7. How can I learn more? Ask your health care provider. Call your local or state health department. Visit the website of the Food and Drug Administration (FDA) for vaccine package inserts and additional information at www.fda.gov/vaccines-blood-biologics/vaccines. Contact the Centers for Disease Control and Prevention (CDC): Call 4-408.197.2662 (1-800-CDC-INFO) or  Visit CDCs influenza website at www.cdc.gov/flu. Vaccine Information Statement   Inactivated Influenza Vaccine   8/6/2021  42 JOHN Orta [id number]-33   Department of Health and Human Services  Centers for Disease Control and Prevention    Office Use Only

## 2022-11-08 ENCOUNTER — ANESTHESIA EVENT (OUTPATIENT)
Dept: ENDOSCOPY | Age: 62
End: 2022-11-08
Payer: COMMERCIAL

## 2022-11-09 ENCOUNTER — ANESTHESIA (OUTPATIENT)
Dept: ENDOSCOPY | Age: 62
End: 2022-11-09
Payer: COMMERCIAL

## 2022-11-09 ENCOUNTER — HOSPITAL ENCOUNTER (OUTPATIENT)
Age: 62
Setting detail: OUTPATIENT SURGERY
Discharge: HOME | End: 2022-11-09
Attending: STUDENT IN AN ORGANIZED HEALTH CARE EDUCATION/TRAINING PROGRAM | Admitting: STUDENT IN AN ORGANIZED HEALTH CARE EDUCATION/TRAINING PROGRAM
Payer: COMMERCIAL

## 2022-11-09 VITALS
RESPIRATION RATE: 18 BRPM | WEIGHT: 255 LBS | HEIGHT: 76 IN | SYSTOLIC BLOOD PRESSURE: 176 MMHG | DIASTOLIC BLOOD PRESSURE: 82 MMHG | TEMPERATURE: 98.4 F | OXYGEN SATURATION: 99 % | HEART RATE: 65 BPM | BODY MASS INDEX: 31.05 KG/M2

## 2022-11-09 LAB
GLUCOSE BLD STRIP.AUTO-MCNC: 82 MG/DL (ref 70–110)
GLUCOSE BLD STRIP.AUTO-MCNC: 93 MG/DL (ref 70–110)

## 2022-11-09 PROCEDURE — 82962 GLUCOSE BLOOD TEST: CPT

## 2022-11-09 PROCEDURE — 76060000032 HC ANESTHESIA 0.5 TO 1 HR: Performed by: STUDENT IN AN ORGANIZED HEALTH CARE EDUCATION/TRAINING PROGRAM

## 2022-11-09 PROCEDURE — 76040000007: Performed by: STUDENT IN AN ORGANIZED HEALTH CARE EDUCATION/TRAINING PROGRAM

## 2022-11-09 PROCEDURE — 74011000250 HC RX REV CODE- 250: Performed by: NURSE ANESTHETIST, CERTIFIED REGISTERED

## 2022-11-09 PROCEDURE — 2709999900 HC NON-CHARGEABLE SUPPLY: Performed by: STUDENT IN AN ORGANIZED HEALTH CARE EDUCATION/TRAINING PROGRAM

## 2022-11-09 PROCEDURE — 74011250636 HC RX REV CODE- 250/636: Performed by: STUDENT IN AN ORGANIZED HEALTH CARE EDUCATION/TRAINING PROGRAM

## 2022-11-09 PROCEDURE — 74011250636 HC RX REV CODE- 250/636: Performed by: NURSE ANESTHETIST, CERTIFIED REGISTERED

## 2022-11-09 PROCEDURE — 00811 ANES LWR INTST NDSC NOS: CPT | Performed by: ANESTHESIOLOGY

## 2022-11-09 PROCEDURE — 77030036656: Performed by: STUDENT IN AN ORGANIZED HEALTH CARE EDUCATION/TRAINING PROGRAM

## 2022-11-09 PROCEDURE — 74011250636 HC RX REV CODE- 250/636: Performed by: ANESTHESIOLOGY

## 2022-11-09 PROCEDURE — 74011000250 HC RX REV CODE- 250: Performed by: ANESTHESIOLOGY

## 2022-11-09 PROCEDURE — 77030008565 HC TBNG SUC IRR ERBE -B: Performed by: STUDENT IN AN ORGANIZED HEALTH CARE EDUCATION/TRAINING PROGRAM

## 2022-11-09 RX ORDER — SODIUM CHLORIDE, SODIUM LACTATE, POTASSIUM CHLORIDE, CALCIUM CHLORIDE 600; 310; 30; 20 MG/100ML; MG/100ML; MG/100ML; MG/100ML
75 INJECTION, SOLUTION INTRAVENOUS CONTINUOUS
Status: DISCONTINUED | OUTPATIENT
Start: 2022-11-09 | End: 2022-11-10 | Stop reason: HOSPADM

## 2022-11-09 RX ORDER — SODIUM CHLORIDE 0.9 % (FLUSH) 0.9 %
5-40 SYRINGE (ML) INJECTION EVERY 8 HOURS
Status: DISCONTINUED | OUTPATIENT
Start: 2022-11-09 | End: 2022-11-10 | Stop reason: HOSPADM

## 2022-11-09 RX ORDER — HYDRALAZINE HYDROCHLORIDE 20 MG/ML
10 INJECTION INTRAMUSCULAR; INTRAVENOUS AS NEEDED
Status: COMPLETED | OUTPATIENT
Start: 2022-11-09 | End: 2022-11-09

## 2022-11-09 RX ORDER — MAGNESIUM SULFATE 100 %
4 CRYSTALS MISCELLANEOUS AS NEEDED
Status: DISCONTINUED | OUTPATIENT
Start: 2022-11-09 | End: 2022-11-10 | Stop reason: HOSPADM

## 2022-11-09 RX ORDER — ONDANSETRON 2 MG/ML
4 INJECTION INTRAMUSCULAR; INTRAVENOUS AS NEEDED
Status: DISCONTINUED | OUTPATIENT
Start: 2022-11-09 | End: 2022-11-10 | Stop reason: HOSPADM

## 2022-11-09 RX ORDER — SODIUM CHLORIDE, SODIUM LACTATE, POTASSIUM CHLORIDE, CALCIUM CHLORIDE 600; 310; 30; 20 MG/100ML; MG/100ML; MG/100ML; MG/100ML
INJECTION, SOLUTION INTRAVENOUS
Status: DISCONTINUED | OUTPATIENT
Start: 2022-11-09 | End: 2022-11-09 | Stop reason: HOSPADM

## 2022-11-09 RX ORDER — SODIUM CHLORIDE 0.9 % (FLUSH) 0.9 %
5-40 SYRINGE (ML) INJECTION AS NEEDED
Status: DISCONTINUED | OUTPATIENT
Start: 2022-11-09 | End: 2022-11-10 | Stop reason: HOSPADM

## 2022-11-09 RX ORDER — INSULIN LISPRO 100 [IU]/ML
INJECTION, SOLUTION INTRAVENOUS; SUBCUTANEOUS ONCE
Status: DISCONTINUED | OUTPATIENT
Start: 2022-11-09 | End: 2022-11-10 | Stop reason: HOSPADM

## 2022-11-09 RX ORDER — PROPOFOL 10 MG/ML
INJECTION, EMULSION INTRAVENOUS AS NEEDED
Status: DISCONTINUED | OUTPATIENT
Start: 2022-11-09 | End: 2022-11-09 | Stop reason: HOSPADM

## 2022-11-09 RX ORDER — LABETALOL HCL 20 MG/4 ML
10 SYRINGE (ML) INTRAVENOUS ONCE
Status: COMPLETED | OUTPATIENT
Start: 2022-11-09 | End: 2022-11-09

## 2022-11-09 RX ORDER — LIDOCAINE HYDROCHLORIDE 20 MG/ML
INJECTION, SOLUTION EPIDURAL; INFILTRATION; INTRACAUDAL; PERINEURAL AS NEEDED
Status: DISCONTINUED | OUTPATIENT
Start: 2022-11-09 | End: 2022-11-09 | Stop reason: HOSPADM

## 2022-11-09 RX ADMIN — LABETALOL HYDROCHLORIDE 10 MG: 5 INJECTION, SOLUTION INTRAVENOUS at 14:51

## 2022-11-09 RX ADMIN — SODIUM CHLORIDE, POTASSIUM CHLORIDE, SODIUM LACTATE AND CALCIUM CHLORIDE 75 ML/HR: 600; 310; 30; 20 INJECTION, SOLUTION INTRAVENOUS at 13:05

## 2022-11-09 RX ADMIN — PROPOFOL 100 MG: 10 INJECTION, EMULSION INTRAVENOUS at 13:42

## 2022-11-09 RX ADMIN — FAMOTIDINE 20 MG: 10 INJECTION, SOLUTION INTRAVENOUS at 13:04

## 2022-11-09 RX ADMIN — LIDOCAINE HYDROCHLORIDE 40 MG: 20 INJECTION, SOLUTION EPIDURAL; INFILTRATION; INTRACAUDAL; PERINEURAL at 13:37

## 2022-11-09 RX ADMIN — HYDRALAZINE HYDROCHLORIDE 10 MG: 20 INJECTION INTRAMUSCULAR; INTRAVENOUS at 15:57

## 2022-11-09 RX ADMIN — PROPOFOL 50 MG: 10 INJECTION, EMULSION INTRAVENOUS at 13:47

## 2022-11-09 RX ADMIN — PROPOFOL 100 MG: 10 INJECTION, EMULSION INTRAVENOUS at 13:39

## 2022-11-09 RX ADMIN — SODIUM CHLORIDE, SODIUM LACTATE, POTASSIUM CHLORIDE, AND CALCIUM CHLORIDE: 600; 310; 30; 20 INJECTION, SOLUTION INTRAVENOUS at 13:32

## 2022-11-09 RX ADMIN — PROPOFOL 50 MG: 10 INJECTION, EMULSION INTRAVENOUS at 13:56

## 2022-11-09 RX ADMIN — LABETALOL HYDROCHLORIDE 10 MG: 5 INJECTION, SOLUTION INTRAVENOUS at 15:15

## 2022-11-09 RX ADMIN — PROPOFOL 50 MG: 10 INJECTION, EMULSION INTRAVENOUS at 13:51

## 2022-11-09 RX ADMIN — HYDRALAZINE HYDROCHLORIDE 10 MG: 20 INJECTION INTRAMUSCULAR; INTRAVENOUS at 16:08

## 2022-11-09 RX ADMIN — PROPOFOL 50 MG: 10 INJECTION, EMULSION INTRAVENOUS at 13:09

## 2022-11-09 NOTE — ANESTHESIA POSTPROCEDURE EVALUATION
Procedure(s):  COLONOSCOPY WITH APCs. MAC    Anesthesia Post Evaluation      Multimodal analgesia: multimodal analgesia used between 6 hours prior to anesthesia start to PACU discharge  Patient location during evaluation: bedside  Patient participation: complete - patient participated  Level of consciousness: awake  Pain management: adequate  Airway patency: patent  Anesthetic complications: no  Cardiovascular status: stable  Respiratory status: acceptable  Hydration status: acceptable  Post anesthesia nausea and vomiting:  controlled      INITIAL Post-op Vital signs:   Vitals Value Taken Time   /89 11/09/22 1455   Temp 36.3 °C (97.3 °F) 11/09/22 1410   Pulse 57 11/09/22 1459   Resp 14 11/09/22 1459   SpO2 100 % 11/09/22 1459   Vitals shown include unvalidated device data.

## 2022-11-09 NOTE — H&P
[facility]  [phone]    Chief Complaint: Hematochezia, CRCS    History of present illness:  Patient with history of prostate ca s/p radiation. PMH:   Past Medical History:   Diagnosis Date    Allergic rhinitis     BPH (benign prostatic hyperplasia)     Colon polyp 07/15/2020    Dr Paul Katz    Deviated septum and epistaxis Dr. Nasima Childress 2009     DM (diabetes mellitus) ([facility] [address] 12/2015    IFG 3/10; on basis of elev a1c; neuropathy 3/22 on emg Dr Taylor Medrano 7/22    ED (erectile dysfunction)     Fatty liver     on US 6/10; Fib-4 was 0.91 from 5/12    H. 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 Ana Merritt     Prostate cancer [facility]) 02/2019    Dr Trudy Hooker bx 6/12+, psa 4.4, [place] 3+3; 2nd opinion Dr Eleno Koenig; now seeing Dr Syd Linares; s/p ext beam radiation Dr Travis Nieto    Sickle cell anemia [facility])     Thiamine deficiency 03/2022    Dr Taylor Medrano     Allergies:    Allergies   Allergen Reactions    Hydrochlorothiazide Other (comments)     Weight loss, pt unsure of allergy      Metformin Other (comments)     paresthesia     Medications:   Current Facility-Administered Medications:     sodium chloride (NS) flush 5-40 mL, 5-40 mL, IntraVENous, Q8H, Kelvin Haider CRNA    sodium chloride (NS) flush 5-40 mL, 5-40 mL, IntraVENous, PRN, Virginia Starford, CRNA    lactated Ringers infusion, 75 mL/hr, IntraVENous, CONTINUOUS, Virginia Starford, CRNA, Last Rate: 75 mL/hr at 11/09/22 1305, 75 mL/hr at 11/09/22 1305  FH:   Family History   Problem Relation Age of Onset    Hypertension Mother     Hypertension Father     Stroke Father     Diabetes Sister     Cancer Brother     Prostate Cancer Brother      Social:   Social History     Socioeconomic History    Marital status:     Number of children: 2   Occupational History    Occupation: [employer] Tobacco Use    Smoking status: Never    Smokeless tobacco: Never   Substance and Sexual Activity    Alcohol use: No    Drug use: No    Sexual activity: Yes     Surgical H:   Past Surgical History:   Procedure Laterality Date    HX COLONOSCOPY      Dr. Ximena Ramachandran 6/12 neg; Dr Areli Talbot 7/15/20 polyp    HX CYSTECTOMY      HX GI  02/2001    negative barium enema    HX MENISCUS REPAIR Left 11/2015    medial meniscus Dr Janina Ludwig       ROS: positive for hematochezia    Physical Exam: Visit Vitals  BP (!) 166/93 (BP 1 Location: Right upper arm, BP Patient Position: At rest)   Pulse 66   Temp 98.1 °F (36.7 °C)   Resp 18   Ht 6' 4\" (1.93 m)   Wt 115.7 kg (255 lb)   SpO2 100%   BMI 31.04 kg/m²     General appearance: alert, no distress  Eyes: pupils equal and reactive, extraocular eye movements intact  Nodes: No gross adenopathy in neck. Skin: no spider angiomata, jaundice, palmar erythema   Respiratory: clear to auscultation bilaterally  Cardiovascular: regular heart rate, no murmurs, no JVD, normal rate and regular rhythm  Abdomen: soft, non-tender, liver not enlarged, spleen not palpable, no obvious ascites  Extremities: no muscle wasting, no gross arthritic changes  Neurologic: alert and oriented, cranial nerves grossly intact, no asterixis    Labs:   Recent Results (from the past 24 hour(s))   GLUCOSE, POC    Collection Time: 11/09/22 12:20 PM   Result Value Ref Range    Glucose (POC) 82 70 - 110 mg/dL       Imp/ Plan: Will proceed with colonoscopy as planned. Risk benefits alternative including but not limited to infection, bleeding, perforation of viscous, allergic reaction and resultant morbidity and mortality was discussed. Chance of missing a significant lesion due to various reasons were discussed.     Christiano Roque MD   [facility]

## 2022-11-09 NOTE — PERIOP NOTES
Patient's blood pressure >20% admission rate (166/93--admission; current 181/94)  Dr. General Donald consulted . Give  2  doses  of  Labetalol 10 mg  x 2 doses  to have  diastolic close to admission baseline.

## 2022-11-09 NOTE — ANESTHESIA PREPROCEDURE EVALUATION
Relevant Problems   No relevant active problems       Anesthetic History   No history of anesthetic complications            Review of Systems / Medical History  Patient summary reviewed and pertinent labs reviewed    Pulmonary  Within defined limits                 Neuro/Psych   Within defined limits           Cardiovascular    Hypertension: well controlled        Dysrhythmias            GI/Hepatic/Renal           Liver disease     Endo/Other    Diabetes    Obesity     Other Findings   Comments: Hypertension  Allergic rhinitis   HSV (herpes simplex virus) infection  ED (erectile dysfunction)   Fatty liver  Obesity   Deviated septum and epistaxis Dr. Nasima Childress 2009  Prolactinoma [facility])   Hyperlipidemia  H. pylori infection   Diabetes mellitus ([facility] [address]  Cardiac arrhythmia   Sickle cell anemia ([facility] [address]  FHx: prostate cancer   BPH (benign prostatic hyperplasia)  Elevated PSA   Prostate cancer (HCC)  IFG (impaired fasting glucose)   Colon polyp               Physical Exam    Airway  Mallampati: II  TM Distance: > 6 cm  Neck ROM: normal range of motion   Mouth opening: Normal     Cardiovascular    Rhythm: regular  Rate: normal         Dental  No notable dental hx       Pulmonary                 Abdominal  GI exam deferred       Other Findings            Anesthetic Plan    ASA: 3  Anesthesia type: MAC          Induction: Intravenous  Anesthetic plan and risks discussed with: Patient

## 2022-11-09 NOTE — PERIOP NOTES
Patient's  blood pressure still elevated 200/102---Dr. David Knight consulted and advised to give 10 mg Hydralazine x 2 until systolic is <041.

## 2022-12-29 NOTE — PROGRESS NOTES
Xiomara Darby presents today for   Chief Complaint   Patient presents with    Dizziness     Patient states the problem has been going on for 2-3 days. Patient states he had elevated bp following his colonoscopy 11-9-22, taking hours to lower to recommended numbers. 1. \"Have you been to the ER, urgent care clinic since your last visit? Hospitalized since your last visit? \" Yes  11-9-22 @  [facility], Colonoscopy, Dr. Prakash De León (rectal bleeding)    2. \"Have you seen or consulted any other health care providers outside of the [address] [place] [address] since your last visit? \" no     3. For patients aged 39-70: Has the patient had a colonoscopy / FIT/ Cologuard? Yes - no Care Gap present      If the patient is female:    4. For patients aged 41-77: Has the patient had a mammogram within the past 2 years? NA - based on age or sex  See top three    5. For patients aged 21-65: Has the patient had a pap smear?  NA - based on age or sex

## 2022-12-30 ENCOUNTER — OFFICE VISIT (OUTPATIENT)
Dept: INTERNAL MEDICINE CLINIC | Age: 62
End: 2022-12-30
Payer: COMMERCIAL

## 2022-12-30 VITALS
SYSTOLIC BLOOD PRESSURE: 150 MMHG | RESPIRATION RATE: 20 BRPM | TEMPERATURE: 97.2 F | OXYGEN SATURATION: 99 % | BODY MASS INDEX: 31.54 KG/M2 | HEART RATE: 82 BPM | DIASTOLIC BLOOD PRESSURE: 87 MMHG | HEIGHT: 76 IN | WEIGHT: 259 LBS

## 2022-12-30 DIAGNOSIS — G90.9 AUTONOMIC DYSFUNCTION: Primary | ICD-10-CM

## 2022-12-30 RX ORDER — LINACLOTIDE 145 UG/1
145 CAPSULE, GELATIN COATED ORAL DAILY
COMMUNITY
Start: 2022-10-17

## 2023-01-01 NOTE — PROGRESS NOTES
58 y.o. male who presents for evaluation. He reports that he's been having episodic lightheadedness spells for months now. Actually had 1 episode wherein he 'fell out'. Happens when he gets up from seated or lying down position,  no cp, sob, pnd, edema. His bp has been running 'high' and they especially noted that when he had his colo done in November. No focal neuro complaints, weakness. He is known to have neuropathy from DM. Currently on benazepril 20 every day    Past Medical History:   Diagnosis Date    Allergic rhinitis     BPH (benign prostatic hyperplasia)     Colon polyp 07/15/2020    Dr Christopher Gil    Deviated septum and epistaxis Dr. Cornel Salomon 2009     DM (diabetes mellitus) ([facility] 12/2015    IFG 3/10; on basis of elev a1c; neuropathy 3/22 on emg Dr River Tapia 7/22    ED (erectile dysfunction)     Fatty liver     on US 6/10; Fib-4 was 0.91 from 5/12    H. 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 Oanh Montesinos     Prostate cancer [facility]) 02/2019    Dr Farhat Lomeli bx 6/12+, psa 4.4, [place] 3+3; 2nd opinion Dr Vargas Pretty; now seeing Dr Clover Barrera; s/p ext beam radiation Dr Samir Poe    Radiation proctitis 11/2022    Dr Dwayne Ross    Sickle cell anemia [facility])     Thiamine deficiency 03/2022    Dr River Tapia     Current Outpatient Medications   Medication Sig    Linzess 145 mcg cap capsule Take 145 mcg by mouth daily. polyethylene glycol (MIRALAX) 17 gram/dose powder Take 17 g by mouth daily. thiamine HCL (B-1) 100 mg tablet Take 100 mg by mouth in the morning.     Januvia 100 mg tablet take 1 tablet by mouth once daily    valACYclovir (VALTREX) 1 gram tablet take 1 tablet by mouth once daily    benazepriL (LOTENSIN) 20 mg tablet take 1 tablet by mouth once daily    aluminum-magnesium hydroxide 200-200 mg/5 mL susp 5 mL, diphenhydrAMINE 12.5 mg/5 mL liqd 12.5 mg, lidocaine 2 % soln 5 mL 5 mL by Swish and Spit route three (3) times daily. Magic mouth wash   Maalox  Lidocaine 2% viscous   Diphenhydramine oral solution     Pharmacy to mix equal portions of ingredients to a total volume as indicated in the dispense amount. tadalafiL (CIALIS) 5 mg tablet Take 1 Tablet by mouth daily as needed for Erectile Dysfunction. atorvastatin (LIPITOR) 80 mg tablet take 1 tablet by mouth once daily    tadalafiL (CIALIS) 20 mg tablet Take 1 Tab by mouth daily as needed for Erectile Dysfunction. Indications: the inability to have an erection    fluticasone propionate (Flonase Allergy Relief) 50 mcg/actuation nasal spray 2 Sprays by Both Nostrils route daily as needed for Rhinitis or Allergies. sodium chloride (Saline Nasal Mist) 0.65 % nasal squeeze bottle 0.05 mL by Both Nostrils route as needed for Congestion. ezetimibe (ZETIA) 10 mg tablet take 1 tablet by mouth once daily    multivitamin (ONE A DAY) tablet Take 1 Tab by mouth daily. OTHER Vitamin A supplement    glucose blood VI test strips (ONETOUCH VERIO) strip Patient checks blood sugar daily, Dx E11.9    glucose blood VI test strips (ONETOUCH ULTRA TEST) strip Patient to test Blood sugar 1 a day DX: E11.65     No current facility-administered medications for this visit. Allergies   Allergen Reactions    Hydrochlorothiazide Other (comments)     Weight loss, pt unsure of allergy      Metformin Other (comments)     paresthesia     Visit Vitals  BP (!) 150/87   Pulse 82   Temp 97.2 °F (36.2 °C) (Temporal)   Resp 20   Ht 6' 4\" (1.93 m)   Wt 259 lb (117.5 kg)   SpO2 99%   BMI 31.53 kg/m²   BP lying down 152/82; standing 122/70  A&O x3  Affect is appropriate. Mood stable  No apparent distress  Anicteric, no JVD, adenopathy or thyromegaly. No carotid bruits or radiated murmur  Lungs clear to auscultation, no wheezes or rales  Heart showed regular rate and rhythm.  No murmur, rubs, gallops  Abdomen soft nontender, no hepatosplenomegaly or masses. Extremities without edema. Pulses 1-2+ symmetrically    Assessment and plan:  1. Probable autonomic dysfunction. Liberalize salt and fluid intake, consider midodrine, will ask for cardiology input  2. HTN. Follow closely        Instructions above were handwritten on the lab results sheet that I gave the patient today    Above conditions discussed at length and patient vocalized understanding.   All questions answered to patient satisfaction

## 2023-02-01 NOTE — TELEPHONE ENCOUNTER
Pt aware of message below and verbalized understanding. No further questions or concerns from pt at this time.
pls call    Results for orders placed or performed during the hospital encounter of 05/05/21   PROLACTIN   Result Value Ref Range    Prolactin 10.3 ng/mL     Prolactin normal  Does he want mri pituitary?
English

## 2023-02-04 DIAGNOSIS — E11.40 CONTROLLED TYPE 2 DIABETES MELLITUS WITH DIABETIC NEUROPATHY, WITHOUT LONG-TERM CURRENT USE OF INSULIN (HCC): Primary | ICD-10-CM

## 2023-02-05 DIAGNOSIS — E11.40 CONTROLLED TYPE 2 DIABETES MELLITUS WITH DIABETIC NEUROPATHY, WITHOUT LONG-TERM CURRENT USE OF INSULIN (HCC): Primary | ICD-10-CM

## 2023-02-06 DIAGNOSIS — E11.40 CONTROLLED TYPE 2 DIABETES MELLITUS WITH DIABETIC NEUROPATHY, WITHOUT LONG-TERM CURRENT USE OF INSULIN (HCC): Primary | ICD-10-CM

## 2023-02-07 DIAGNOSIS — E11.40 CONTROLLED TYPE 2 DIABETES MELLITUS WITH DIABETIC NEUROPATHY, WITHOUT LONG-TERM CURRENT USE OF INSULIN (HCC): Primary | ICD-10-CM

## 2023-02-09 ENCOUNTER — OFFICE VISIT (OUTPATIENT)
Dept: CARDIOLOGY CLINIC | Age: 63
End: 2023-02-09
Payer: COMMERCIAL

## 2023-02-09 VITALS
BODY MASS INDEX: 31.54 KG/M2 | WEIGHT: 259 LBS | HEIGHT: 76 IN | HEART RATE: 69 BPM | DIASTOLIC BLOOD PRESSURE: 102 MMHG | SYSTOLIC BLOOD PRESSURE: 176 MMHG | OXYGEN SATURATION: 99 %

## 2023-02-09 DIAGNOSIS — R42 LIGHTHEADEDNESS: ICD-10-CM

## 2023-02-09 DIAGNOSIS — I10 ESSENTIAL HYPERTENSION: ICD-10-CM

## 2023-02-09 DIAGNOSIS — E11.40 CONTROLLED TYPE 2 DIABETES MELLITUS WITH DIABETIC NEUROPATHY, WITHOUT LONG-TERM CURRENT USE OF INSULIN (HCC): ICD-10-CM

## 2023-02-09 DIAGNOSIS — R42 DIZZINESS: Primary | ICD-10-CM

## 2023-02-09 DIAGNOSIS — E78.5 DYSLIPIDEMIA: ICD-10-CM

## 2023-02-09 PROCEDURE — 93000 ELECTROCARDIOGRAM COMPLETE: CPT | Performed by: INTERNAL MEDICINE

## 2023-02-09 PROCEDURE — 3078F DIAST BP <80 MM HG: CPT | Performed by: INTERNAL MEDICINE

## 2023-02-09 PROCEDURE — 99204 OFFICE O/P NEW MOD 45 MIN: CPT | Performed by: INTERNAL MEDICINE

## 2023-02-09 PROCEDURE — 3074F SYST BP LT 130 MM HG: CPT | Performed by: INTERNAL MEDICINE

## 2023-02-09 NOTE — PATIENT INSTRUCTIONS
Follow up with Dr. Sherif Reina in 4 week  48 hour holter monitor   Tilt table - our  will call you to schedule this test

## 2023-02-09 NOTE — PROGRESS NOTES
Eduardo Rangel presents today for   Chief Complaint   Patient presents with    Follow-up     Referred by PCP for Autonomic Dysfunction- no complaints          Bill Barlowast Sr. preferred language for health care discussion is english/other. Is someone accompanying this pt? no    Is the patient using any DME equipment during [address]? no    Depression Screening:  3 most recent PHQ Screens 2/9/2023   Little interest or pleasure in doing things Not at all   Feeling down, depressed, irritable, or hopeless Not at all   Total Score PHQ 2 0       Learning Assessment:  Learning Assessment 7/17/2019   PRIMARY LEARNER Patient   HIGHEST LEVEL OF EDUCATION - PRIMARY LEARNER  GRADUATED HIGH SCHOOL OR GED   BARRIERS PRIMARY LEARNER NONE   CO-LEARNER CAREGIVER No   PRIMARY LANGUAGE ENGLISH   LEARNER PREFERENCE PRIMARY DEMONSTRATION     READING     LISTENING     PICTURES     VIDEOS     DEMONSTRATION   ANSWERED BY Bree Sanchez   RELATIONSHIP SELF       Abuse Screening:  Abuse Screening Questionnaire 2/9/2023   Do you ever feel afraid of your partner? N   Are you in a relationship with someone who physically or mentally threatens you? N   Is it safe for you to go home? Y       Fall Risk  Fall Risk Assessment, last 12 mths 3/17/2022   Able to walk? Yes   Fall in past 12 months? 0   Do you feel unsteady? 0   Are you worried about falling 0       Pt currently taking Anticoagulant therapy? no    Coordination of Care:  1. Have you been to the ER, urgent care clinic since your last visit? Hospitalized since your last visit? no    2. Have you seen or consulted any other health care providers outside of the [address] [place] [address] since your last visit? Include any pap smears or colon screening.  no

## 2023-02-25 PROBLEM — R42 LIGHTHEADEDNESS: Status: ACTIVE | Noted: 2023-02-25

## 2023-02-25 PROBLEM — E78.5 DYSLIPIDEMIA: Status: ACTIVE | Noted: 2023-02-25

## 2023-02-26 NOTE — PROGRESS NOTES
Subjective:  Yamini Montiel is in the office today for cardiac evaluation. He is a 63-year-old hypertensive man that has a longstanding history of episodic dizziness. The patient has had no anita syncope. When he feels \"lightheaded \",  he sits down and relaxes and his symptoms gradually improve. He has had no chest pain or shortness of breath. He has had no peripheral swelling. He reports that when he was younger, he commonly experienced an irregular heartbeat. Patient's cardiac risk factors are diabetes mellitus, hypertension. Patient Active Problem List    Diagnosis Date Noted    Lightheadedness 02/25/2023    Dyslipidemia 02/25/2023    Pain of upper arm 03/17/2022    Prostate cancer ([facility] 03/19/2020    Severe obesity (BMI 35.0-39. 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 Carie Reaves 10/23/2011     Current Outpatient Medications   Medication Sig Dispense Refill    Linzess 145 mcg cap capsule Take 145 mcg by mouth daily. polyethylene glycol (MIRALAX) 17 gram/dose powder Take 17 g by mouth daily. thiamine HCL (B-1) 100 mg tablet Take 100 mg by mouth in the morning. Januvia 100 mg tablet take 1 tablet by mouth once daily 30 Tablet 11    valACYclovir (VALTREX) 1 gram tablet take 1 tablet by mouth once daily 90 Tablet 3    benazepriL (LOTENSIN) 20 mg tablet take 1 tablet by mouth once daily 90 Tablet 3    aluminum-magnesium hydroxide 200-200 mg/5 mL susp 5 mL, diphenhydrAMINE 12.5 mg/5 mL liqd 12.5 mg, lidocaine 2 % soln 5 mL 5 mL by Swish and Spit route three (3) times daily. Magic mouth wash   Maalox  Lidocaine 2% viscous   Diphenhydramine oral solution     Pharmacy to mix equal portions of ingredients to a total volume as indicated in the dispense amount.  150 mL 0    tadalafiL (CIALIS) 5 mg tablet Take 1 Tablet by mouth daily as needed for Erectile Dysfunction. 90 Tablet 3    atorvastatin (LIPITOR) 80 mg tablet take 1 tablet by mouth once daily 90 Tablet 3    tadalafiL (CIALIS) 20 mg tablet Take 1 Tab by mouth daily as needed for Erectile Dysfunction. Indications: the inability to have an erection 30 Tab 11    fluticasone propionate (Flonase Allergy Relief) 50 mcg/actuation nasal spray 2 Sprays by Both Nostrils route daily as needed for Rhinitis or Allergies. 1 Bottle 0    sodium chloride (Saline Nasal Mist) 0.65 % nasal squeeze bottle 0.05 mL by Both Nostrils route as needed for Congestion. 30 mL 0    ezetimibe (ZETIA) 10 mg tablet take 1 tablet by mouth once daily 30 Tab 11    multivitamin (ONE A DAY) tablet Take 1 Tab by mouth daily.       OTHER Vitamin A supplement      glucose blood VI test strips (ONETOUCH VERIO) strip Patient checks blood sugar daily, Dx E11.9 100 Strip 3    glucose blood VI test strips (ONETOUCH ULTRA TEST) strip Patient to test Blood sugar 1 a day DX: E11.65 100 Strip 3     Allergies   Allergen Reactions    Hydrochlorothiazide Other (comments)     Weight loss, pt unsure of allergy      Metformin Other (comments)     paresthesia     Past Medical History:   Diagnosis Date    Allergic rhinitis     BPH (benign prostatic hyperplasia)     Colon polyp 07/15/2020    Dr Kimmie Umaña    Deviated septum and epistaxis Dr. Danielle Churchill 2009     DM (diabetes mellitus) ([facility] 12/2015    IFG 3/10; on basis of elev a1c; neuropathy 3/22 on emg Dr Quintin Madden 7/22    ED (erectile dysfunction)     Fatty liver     on US 6/10; Fib-4 was 0.91 from 5/12    H. 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 Lydia Dickson     Prostate cancer [facility]) 02/2019    Dr Purvi Stinson bx 6/12+, psa 4.4, [place] 3+3; 2nd opinion Dr Yoshi Hernández; now seeing Dr Jenna Goodwin; s/p ext beam radiation Dr Guzman Appl    Radiation proctitis 2022    Dr Delvis Wiggins    Sickle cell anemia [facility])     Thiamine deficiency 2022    Dr Salomon Lozano     Past Surgical History:   Procedure Laterality Date    COLONOSCOPY N/A 2022    Dr Delvis Wiggins; mult avm c/w radiation proctitis    HX COLONOSCOPY      Dr. Paola Celaya () neg; Dr Marylou Zazueta (7/15/20) polyp; Dr Delvis Wiggins (22) avms    HX CYSTECTOMY      HX GI  2001    negative barium enema    HX MENISCUS REPAIR Left 2015    medial meniscus Dr Catherine Castellanos     Family History   Problem Relation Age of Onset    Hypertension Mother     Hypertension Father     Stroke Father     Diabetes Sister     Cancer Brother     Prostate Cancer Brother      Social History     Tobacco Use   Smoking Status Never   Smokeless Tobacco Never          Review of Systems, additional:          Constitutional: negative  Eyes: negative  Respiratory: negative  Cardiovascular: Positive for lightheadedness  Gastrointestinal: negative  Musculoskeletal:negative  Neurological: negative  Behvioral/Psych: negative  Endocrine: negative  ENT: negative    Objective:   Visit Vitals  BP (!) 176/102 (BP 1 Location: Right upper arm, BP Patient Position: Sitting, BP Cuff Size: Adult long)   Pulse 69   Ht 6' 4\" (1.93 m)   Wt 117.5 kg (259 lb)   SpO2 99%   BMI 31.53 kg/m²     General:  alert, cooperative, no distress   Chest Wall: inspection normal - no chest wall deformities or tenderness, respiratory effort normal   Lung: clear to auscultation bilaterally   Heart:  normal rate and regular rhythm, S1 and S2 normal, no murmurs noted, no gallops noted, no JVD   Abdomen: soft, non-tender. Bowel sounds normal. No masses,  no organomegaly   Extremities: extremities normal, atraumatic, no cyanosis or edema Skin: no rashes   Neuro: alert, oriented, normal speech, no focal findings or movement disorder noted     EK2020. Sinus rhythm. Normal.    Assessment/Plan:       ICD-10-CM ICD-9-CM    1. Dizziness, will order tilt table and 48-hour Holter monitor. Return in 4 weeks. R42 780.4 AMB POC EKG ROUTINE W/ 12 LEADS, INTER & REP      2. Essential hypertension , blood pressure very elevated today. I10 401.9 AMB POC EKG ROUTINE W/ 12 LEADS, INTER & REP      3. Lightheadedness  R42 780.4       4. Controlled type 2 diabetes mellitus with diabetic neuropathy, without long-term current use of insulin ([facility])  E11.40 250.60      357.2       5. Dyslipidemia , 3/19/2022. Total cholesterol 185. HDL 88. LDL 84. Triglycerides 65.   Patient taking atorvastatin 80 mg daily E78.5 272.4

## 2023-03-03 RX ORDER — BENAZEPRIL HYDROCHLORIDE 20 MG/1
TABLET ORAL
Qty: 90 TABLET | Refills: 3 | Status: SHIPPED | OUTPATIENT
Start: 2023-03-03

## 2023-04-06 RX ORDER — ATORVASTATIN CALCIUM 80 MG/1
TABLET, FILM COATED ORAL
Qty: 90 TABLET | Refills: 3 | Status: SHIPPED | OUTPATIENT
Start: 2023-04-06

## 2023-04-15 ENCOUNTER — HOSPITAL ENCOUNTER (OUTPATIENT)
Facility: HOSPITAL | Age: 63
Discharge: HOME | End: 2023-04-18
Payer: COMMERCIAL

## 2023-04-15 LAB
ALBUMIN SERPL-MCNC: 3.9 G/DL (ref 3.4–5)
ALBUMIN/GLOB SERPL: 1.3 (ref 0.8–1.7)
ALP SERPL-CCNC: 57 U/L (ref 45–117)
ALT SERPL-CCNC: 25 U/L (ref 16–61)
ANION GAP SERPL CALC-SCNC: 3 MMOL/L (ref 3–18)
AST SERPL-CCNC: 16 U/L (ref 10–38)
BILIRUB SERPL-MCNC: 0.4 MG/DL (ref 0.2–1)
BUN SERPL-MCNC: 14 MG/DL (ref 7–18)
BUN/CREAT SERPL: 12 (ref 12–20)
CALCIUM SERPL-MCNC: 9.2 MG/DL (ref 8.5–10.1)
CHLORIDE SERPL-SCNC: 104 MMOL/L (ref 100–111)
CHOLEST SERPL-MCNC: 198 MG/DL
CO2 SERPL-SCNC: 30 MMOL/L (ref 21–32)
CREAT SERPL-MCNC: 1.19 MG/DL (ref 0.6–1.3)
CREAT UR-MCNC: 192 MG/DL (ref 30–125)
ERYTHROCYTE [DISTWIDTH] IN BLOOD BY AUTOMATED COUNT: 13.9 % (ref 11.6–14.5)
GLOBULIN SER CALC-MCNC: 3.1 G/DL (ref 2–4)
GLUCOSE SERPL-MCNC: 100 MG/DL (ref 74–99)
HCT VFR BLD AUTO: 37.8 % (ref 36–48)
HDLC SERPL-MCNC: 102 MG/DL (ref 40–60)
HDLC SERPL: 1.9 (ref 0–5)
HGB BLD-MCNC: 12.6 G/DL (ref 13–16)
LDLC SERPL CALC-MCNC: 87.6 MG/DL (ref 0–100)
LIPID PANEL: ABNORMAL
MCH RBC QN AUTO: 27.8 PG (ref 24–34)
MCHC RBC AUTO-ENTMCNC: 33.3 G/DL (ref 31–37)
MCV RBC AUTO: 83.3 FL (ref 78–100)
MICROALBUMIN UR-MCNC: 0.61 MG/DL (ref 0–3)
MICROALBUMIN/CREAT UR-RTO: 3 MG/G (ref 0–30)
NRBC # BLD: 0 K/UL (ref 0–0.01)
NRBC BLD-RTO: 0 PER 100 WBC
PLATELET # BLD AUTO: 188 K/UL (ref 135–420)
PMV BLD AUTO: 10.4 FL (ref 9.2–11.8)
POTASSIUM SERPL-SCNC: 4.5 MMOL/L (ref 3.5–5.5)
PROT SERPL-MCNC: 7 G/DL (ref 6.4–8.2)
RBC # BLD AUTO: 4.54 M/UL (ref 4.35–5.65)
SODIUM SERPL-SCNC: 137 MMOL/L (ref 136–145)
TRIGL SERPL-MCNC: 42 MG/DL
VLDLC SERPL CALC-MCNC: 8.4 MG/DL
WBC # BLD AUTO: 3.1 K/UL (ref 4.6–13.2)

## 2023-04-15 PROCEDURE — 85027 COMPLETE CBC AUTOMATED: CPT

## 2023-04-15 PROCEDURE — 36415 COLL VENOUS BLD VENIPUNCTURE: CPT

## 2023-04-15 PROCEDURE — 80053 COMPREHEN METABOLIC PANEL: CPT

## 2023-04-15 PROCEDURE — 82043 UR ALBUMIN QUANTITATIVE: CPT

## 2023-04-15 PROCEDURE — 80061 LIPID PANEL: CPT

## 2023-04-20 NOTE — PROGRESS NOTES
Evelina Lockhart presents today for   Chief Complaint   Patient presents with    Follow-up    Discuss Labs     4-15-23    Prostate Cancer    Diabetes     Type 2 with diabetic neuropathy     Hyperlipidemia    Hypertension     1. \"Have you been to the ER, urgent care clinic since your last visit? Hospitalized since your last visit? \" Yes   4-11-23 @  [facility], Syncope and collapse     2. \"Have you seen or consulted any other health care providers outside of the [address] [place] [address] since your last visit? \" no     3. For patients aged 39-70: Has the patient had a colonoscopy / FIT/ Cologuard? Yes - no Care Gap present      If the patient is female:    4. For patients aged 41-77: Has the patient had a mammogram within the past 2 years? NA - based on age or sex      11. For patients aged 21-65: Has the patient had a pap smear? NA - based on age or sex    Jay Peck Sr. 1960 male who presents for lab orders following office visit. Order placed for Point of Care Hemoglobin A1C,  per Verbal Order from Dr. Norberto Paiz with read back. There were no reactions observed.     Ban Vazquez CMA    Hemoglobin A1C, POC 5.4 %
several visits to point out trends in the numbers    Patient Active Problem List   Diagnosis    Erectile dysfunction    Prostate cancer ([facility]    Hyperlipidemia    Essential hypertension    Prolactinoma ([facility]    Severe obesity (BMI 35.0-39. 9) with comorbidity ([facility]    Controlled type 2 diabetes mellitus, without long-term current use of insulin (HCC)    Pain of upper arm    Lightheadedness    Dyslipidemia         Assessment and plan:  1. Ortho. F/U Dr Brad Lilly  2. Obesity. Lifestyle and dietary measures, previously declined appetite supp. He continues to drop down in weight  3. DM. Controlled on januvia  and not interested in changing at this time  4. Erectile dysfunction. Prn cialis refilled   5. Prolactinoma. Following   6. HLP. At target on lipitor and zetia  7. HTN. Controlled on lotensin  8. Prostate ca. Per urology  9. B1 def. supplement  10. Radiation colitis? 11. Anemia borderline. Check with serologies next draw        RTC 4/23    Above conditions discussed at length and patient vocalized understanding. All questions answered to patient satisfaction     Diagnosis Orders   1. Controlled type 2 diabetes mellitus without complication, without long-term current use of insulin (HCC)  AMB POC HEMOGLOBIN A1C    HEMOGLOBIN A1C W/O EAG      2. Essential hypertension        3. Prostate cancer ([facility]        4. Hyperlipidemia, unspecified hyperlipidemia type        5. Erectile dysfunction, unspecified erectile dysfunction type  tadalafil (CIALIS) 20 MG tablet      6. Lightheadedness        7. Dyslipidemia        8. Syncope, unspecified syncope type  External Referral To Cardiology      9.  Anemia, unspecified type  CBC with Auto Differential    Ferritin    Electrophoresis Protein, Serum    Iron and TIBC    Vitamin B12 & Folate

## 2023-04-21 ENCOUNTER — OFFICE VISIT (OUTPATIENT)
Age: 63
End: 2023-04-21
Payer: COMMERCIAL

## 2023-04-21 VITALS
TEMPERATURE: 97.4 F | WEIGHT: 254 LBS | HEART RATE: 82 BPM | BODY MASS INDEX: 30.93 KG/M2 | OXYGEN SATURATION: 98 % | SYSTOLIC BLOOD PRESSURE: 122 MMHG | RESPIRATION RATE: 18 BRPM | HEIGHT: 76 IN | DIASTOLIC BLOOD PRESSURE: 70 MMHG

## 2023-04-21 DIAGNOSIS — R55 SYNCOPE, UNSPECIFIED SYNCOPE TYPE: ICD-10-CM

## 2023-04-21 DIAGNOSIS — D64.9 ANEMIA, UNSPECIFIED TYPE: ICD-10-CM

## 2023-04-21 DIAGNOSIS — I10 ESSENTIAL HYPERTENSION: ICD-10-CM

## 2023-04-21 DIAGNOSIS — E78.5 DYSLIPIDEMIA: ICD-10-CM

## 2023-04-21 DIAGNOSIS — C61 PROSTATE CANCER (HCC): ICD-10-CM

## 2023-04-21 DIAGNOSIS — R42 LIGHTHEADEDNESS: ICD-10-CM

## 2023-04-21 DIAGNOSIS — E11.9 CONTROLLED TYPE 2 DIABETES MELLITUS WITHOUT COMPLICATION, WITHOUT LONG-TERM CURRENT USE OF INSULIN (HCC): Primary | ICD-10-CM

## 2023-04-21 DIAGNOSIS — E78.5 HYPERLIPIDEMIA, UNSPECIFIED HYPERLIPIDEMIA TYPE: ICD-10-CM

## 2023-04-21 DIAGNOSIS — N52.9 ERECTILE DYSFUNCTION, UNSPECIFIED ERECTILE DYSFUNCTION TYPE: ICD-10-CM

## 2023-04-21 LAB — HBA1C MFR BLD: 5.4 %

## 2023-04-21 PROCEDURE — 83036 HEMOGLOBIN GLYCOSYLATED A1C: CPT | Performed by: INTERNAL MEDICINE

## 2023-04-21 PROCEDURE — 99214 OFFICE O/P EST MOD 30 MIN: CPT | Performed by: INTERNAL MEDICINE

## 2023-04-21 PROCEDURE — 3078F DIAST BP <80 MM HG: CPT | Performed by: INTERNAL MEDICINE

## 2023-04-21 PROCEDURE — 3074F SYST BP LT 130 MM HG: CPT | Performed by: INTERNAL MEDICINE

## 2023-04-21 SDOH — ECONOMIC STABILITY: FOOD INSECURITY: WITHIN THE PAST 12 MONTHS, YOU WORRIED THAT YOUR FOOD WOULD RUN OUT BEFORE YOU GOT MONEY TO BUY MORE.: NEVER TRUE

## 2023-04-21 SDOH — ECONOMIC STABILITY: INCOME INSECURITY: HOW HARD IS IT FOR YOU TO PAY FOR THE VERY BASICS LIKE FOOD, HOUSING, MEDICAL CARE, AND HEATING?: NOT HARD AT ALL

## 2023-04-21 SDOH — ECONOMIC STABILITY: FOOD INSECURITY: WITHIN THE PAST 12 MONTHS, THE FOOD YOU BOUGHT JUST DIDN'T LAST AND YOU DIDN'T HAVE MONEY TO GET MORE.: NEVER TRUE

## 2023-04-21 SDOH — ECONOMIC STABILITY: HOUSING INSECURITY
IN THE LAST 12 MONTHS, WAS THERE A TIME WHEN YOU DID NOT HAVE A STEADY PLACE TO SLEEP OR SLEPT IN A SHELTER (INCLUDING NOW)?: NO

## 2023-04-21 ASSESSMENT — PATIENT HEALTH QUESTIONNAIRE - PHQ9
SUM OF ALL RESPONSES TO PHQ QUESTIONS 1-9: 0
1. LITTLE INTEREST OR PLEASURE IN DOING THINGS: 0
2. FEELING DOWN, DEPRESSED OR HOPELESS: 0
SUM OF ALL RESPONSES TO PHQ9 QUESTIONS 1 & 2: 0
SUM OF ALL RESPONSES TO PHQ QUESTIONS 1-9: 0

## 2023-04-23 RX ORDER — TADALAFIL 20 MG/1
20 TABLET ORAL DAILY PRN
Qty: 30 TABLET | Refills: 2 | Status: SHIPPED | OUTPATIENT
Start: 2023-04-23

## 2023-05-26 RX ORDER — SITAGLIPTIN 100 MG/1
TABLET, FILM COATED ORAL
Qty: 90 TABLET | Refills: 2 | Status: SHIPPED | OUTPATIENT
Start: 2023-05-26

## 2023-06-09 RX ORDER — VALACYCLOVIR HYDROCHLORIDE 1 G/1
TABLET, FILM COATED ORAL
Qty: 90 TABLET | Refills: 3 | Status: SHIPPED | OUTPATIENT
Start: 2023-06-09

## 2023-06-30 ENCOUNTER — HOSPITAL ENCOUNTER (EMERGENCY)
Facility: HOSPITAL | Age: 63
Discharge: HOME | End: 2023-06-30
Attending: EMERGENCY MEDICINE
Payer: COMMERCIAL

## 2023-06-30 VITALS
WEIGHT: 253 LBS | BODY MASS INDEX: 30.81 KG/M2 | HEIGHT: 76 IN | DIASTOLIC BLOOD PRESSURE: 89 MMHG | HEART RATE: 62 BPM | OXYGEN SATURATION: 100 % | RESPIRATION RATE: 18 BRPM | TEMPERATURE: 98.1 F | SYSTOLIC BLOOD PRESSURE: 137 MMHG

## 2023-06-30 DIAGNOSIS — T14.8XXA PUNCTURE WOUND: Primary | ICD-10-CM

## 2023-06-30 PROCEDURE — 99284 EMERGENCY DEPT VISIT MOD MDM: CPT

## 2023-06-30 PROCEDURE — 90715 TDAP VACCINE 7 YRS/> IM: CPT | Performed by: EMERGENCY MEDICINE

## 2023-06-30 PROCEDURE — 6360000002 HC RX W HCPCS: Performed by: EMERGENCY MEDICINE

## 2023-06-30 PROCEDURE — 90471 IMMUNIZATION ADMIN: CPT | Performed by: EMERGENCY MEDICINE

## 2023-06-30 RX ORDER — DOXYCYCLINE HYCLATE 100 MG
100 TABLET ORAL 2 TIMES DAILY
Qty: 10 TABLET | Refills: 0 | Status: SHIPPED | OUTPATIENT
Start: 2023-06-30 | End: 2023-07-05

## 2023-06-30 RX ADMIN — TETANUS TOXOID, REDUCED DIPHTHERIA TOXOID AND ACELLULAR PERTUSSIS VACCINE, ADSORBED 0.5 ML: 5; 2.5; 8; 8; 2.5 SUSPENSION INTRAMUSCULAR at 18:09

## 2023-06-30 ASSESSMENT — PAIN SCALES - GENERAL: PAINLEVEL_OUTOF10: 3

## 2023-06-30 ASSESSMENT — PAIN - FUNCTIONAL ASSESSMENT: PAIN_FUNCTIONAL_ASSESSMENT: 0-10

## 2023-07-17 ENCOUNTER — OFFICE VISIT (OUTPATIENT)
Age: 63
End: 2023-07-17
Payer: COMMERCIAL

## 2023-07-17 ENCOUNTER — HOSPITAL ENCOUNTER (OUTPATIENT)
Facility: HOSPITAL | Age: 63
Discharge: HOME | End: 2023-07-20
Payer: COMMERCIAL

## 2023-07-17 VITALS
BODY MASS INDEX: 31.66 KG/M2 | WEIGHT: 260 LBS | HEIGHT: 76 IN | HEART RATE: 82 BPM | OXYGEN SATURATION: 98 % | SYSTOLIC BLOOD PRESSURE: 147 MMHG | RESPIRATION RATE: 18 BRPM | DIASTOLIC BLOOD PRESSURE: 83 MMHG | TEMPERATURE: 98.4 F

## 2023-07-17 DIAGNOSIS — R10.9 RIGHT FLANK PAIN: Primary | ICD-10-CM

## 2023-07-17 LAB
ALBUMIN SERPL-MCNC: 3.5 G/DL (ref 3.4–5)
ALBUMIN/GLOB SERPL: 1.2 (ref 0.8–1.7)
ALP SERPL-CCNC: 53 U/L (ref 45–117)
ALT SERPL-CCNC: 24 U/L (ref 16–61)
ANION GAP SERPL CALC-SCNC: 4 MMOL/L (ref 3–18)
APPEARANCE UR: CLEAR
AST SERPL-CCNC: 19 U/L (ref 10–38)
BACTERIA URNS QL MICRO: ABNORMAL /HPF
BASOPHILS # BLD: 0 K/UL (ref 0–0.1)
BASOPHILS NFR BLD: 1 % (ref 0–2)
BILIRUB SERPL-MCNC: 0.8 MG/DL (ref 0.2–1)
BILIRUB UR QL: NEGATIVE
BUN SERPL-MCNC: 13 MG/DL (ref 7–18)
BUN/CREAT SERPL: 10 (ref 12–20)
CALCIUM SERPL-MCNC: 9 MG/DL (ref 8.5–10.1)
CHLORIDE SERPL-SCNC: 106 MMOL/L (ref 100–111)
CO2 SERPL-SCNC: 31 MMOL/L (ref 21–32)
COLOR UR: YELLOW
CREAT SERPL-MCNC: 1.31 MG/DL (ref 0.6–1.3)
DIFFERENTIAL METHOD BLD: ABNORMAL
EOSINOPHIL # BLD: 0.1 K/UL (ref 0–0.4)
EOSINOPHIL NFR BLD: 4 % (ref 0–5)
EPITH CASTS URNS QL MICRO: ABNORMAL /LPF (ref 0–5)
ERYTHROCYTE [DISTWIDTH] IN BLOOD BY AUTOMATED COUNT: 14.1 % (ref 11.6–14.5)
GLOBULIN SER CALC-MCNC: 2.9 G/DL (ref 2–4)
GLUCOSE SERPL-MCNC: 97 MG/DL (ref 74–99)
GLUCOSE UR STRIP.AUTO-MCNC: NEGATIVE MG/DL
HCT VFR BLD AUTO: 36.9 % (ref 36–48)
HGB BLD-MCNC: 12.2 G/DL (ref 13–16)
HGB UR QL STRIP: NEGATIVE
IMM GRANULOCYTES # BLD AUTO: 0 K/UL (ref 0–0.04)
IMM GRANULOCYTES NFR BLD AUTO: 0 % (ref 0–0.5)
KETONES UR QL STRIP.AUTO: ABNORMAL MG/DL
LEUKOCYTE ESTERASE UR QL STRIP.AUTO: NEGATIVE
LIPASE SERPL-CCNC: 250 U/L (ref 73–393)
LYMPHOCYTES # BLD: 1.4 K/UL (ref 0.9–3.6)
LYMPHOCYTES NFR BLD: 41 % (ref 21–52)
MCH RBC QN AUTO: 27.6 PG (ref 24–34)
MCHC RBC AUTO-ENTMCNC: 33.1 G/DL (ref 31–37)
MCV RBC AUTO: 83.5 FL (ref 78–100)
MONOCYTES # BLD: 0.4 K/UL (ref 0.05–1.2)
MONOCYTES NFR BLD: 13 % (ref 3–10)
NEUTS SEG # BLD: 1.3 K/UL (ref 1.8–8)
NEUTS SEG NFR BLD: 41 % (ref 40–73)
NITRITE UR QL STRIP.AUTO: NEGATIVE
NRBC # BLD: 0 K/UL (ref 0–0.01)
NRBC BLD-RTO: 0 PER 100 WBC
PH UR STRIP: 5.5 (ref 5–8)
PLATELET # BLD AUTO: 185 K/UL (ref 135–420)
PMV BLD AUTO: 10.3 FL (ref 9.2–11.8)
POTASSIUM SERPL-SCNC: 4 MMOL/L (ref 3.5–5.5)
PROT SERPL-MCNC: 6.4 G/DL (ref 6.4–8.2)
PROT UR STRIP-MCNC: NEGATIVE MG/DL
RBC # BLD AUTO: 4.42 M/UL (ref 4.35–5.65)
RBC #/AREA URNS HPF: ABNORMAL /HPF (ref 0–5)
SODIUM SERPL-SCNC: 141 MMOL/L (ref 136–145)
SP GR UR REFRACTOMETRY: 1.02 (ref 1–1.03)
UROBILINOGEN UR QL STRIP.AUTO: 1 EU/DL (ref 0.2–1)
WBC # BLD AUTO: 3.3 K/UL (ref 4.6–13.2)
WBC URNS QL MICRO: NEGATIVE /HPF (ref 0–4)

## 2023-07-17 PROCEDURE — 83690 ASSAY OF LIPASE: CPT

## 2023-07-17 PROCEDURE — 81001 URINALYSIS AUTO W/SCOPE: CPT

## 2023-07-17 PROCEDURE — 85025 COMPLETE CBC W/AUTO DIFF WBC: CPT

## 2023-07-17 PROCEDURE — 99214 OFFICE O/P EST MOD 30 MIN: CPT | Performed by: INTERNAL MEDICINE

## 2023-07-17 PROCEDURE — 36415 COLL VENOUS BLD VENIPUNCTURE: CPT

## 2023-07-17 PROCEDURE — 3077F SYST BP >= 140 MM HG: CPT | Performed by: INTERNAL MEDICINE

## 2023-07-17 PROCEDURE — 3079F DIAST BP 80-89 MM HG: CPT | Performed by: INTERNAL MEDICINE

## 2023-07-17 PROCEDURE — 80053 COMPREHEN METABOLIC PANEL: CPT

## 2023-07-17 SDOH — ECONOMIC STABILITY: FOOD INSECURITY: WITHIN THE PAST 12 MONTHS, YOU WORRIED THAT YOUR FOOD WOULD RUN OUT BEFORE YOU GOT MONEY TO BUY MORE.: NEVER TRUE

## 2023-07-17 SDOH — ECONOMIC STABILITY: INCOME INSECURITY: HOW HARD IS IT FOR YOU TO PAY FOR THE VERY BASICS LIKE FOOD, HOUSING, MEDICAL CARE, AND HEATING?: NOT HARD AT ALL

## 2023-07-17 SDOH — ECONOMIC STABILITY: FOOD INSECURITY: WITHIN THE PAST 12 MONTHS, THE FOOD YOU BOUGHT JUST DIDN'T LAST AND YOU DIDN'T HAVE MONEY TO GET MORE.: NEVER TRUE

## 2023-07-17 ASSESSMENT — PATIENT HEALTH QUESTIONNAIRE - PHQ9
SUM OF ALL RESPONSES TO PHQ9 QUESTIONS 1 & 2: 0
2. FEELING DOWN, DEPRESSED OR HOPELESS: 0
SUM OF ALL RESPONSES TO PHQ QUESTIONS 1-9: 0
SUM OF ALL RESPONSES TO PHQ QUESTIONS 1-9: 0
1. LITTLE INTEREST OR PLEASURE IN DOING THINGS: 0
SUM OF ALL RESPONSES TO PHQ QUESTIONS 1-9: 0
SUM OF ALL RESPONSES TO PHQ QUESTIONS 1-9: 0

## 2023-07-17 NOTE — PROGRESS NOTES
61 y.o. male who presents for evaluation. For the last 2 weeks, he has been having pain and discomfort in the right flank/lower back. No nausea, vomiting, he has been having some GI distress and actually has appointment with [facility] in the near future. Last colonoscopy was November 2022 as belwo, CT was August 2022 which was unrevealing. No urinary symptoms. He does not recall any trauma, denies any midline back pain or radicular symptoms, no bruising or rash. He has not tried anything for his pain.     Past Medical History:   Diagnosis Date    Allergic rhinitis     BPH (benign prostatic hyperplasia)     Colon polyp 07/15/2020    Dr Bruno Macias    Deviated septum     DM (diabetes mellitus) ([address]) 12/2015    IFG 3/10; on basis of elev a1c; neuropathy 3/22 on emg Dr Lawanda Harmon 7/22    ED (erectile dysfunction)     Fatty liver     on US 6/10; Fib-4 was 0.91 from 5/12    H. pylori infection     EGD w dilation Dr Merry Garcia 3/15    HSV (herpes simplex virus) infection     Hyperlipidemia     Hypertension     Obesity     peak 292 lbs, bmi 37.8 from 2/14; IF 4/18 start weight 280 lbs    Prolactinoma ([address])     Dr. Rea Guerra     Prostate cancer [facility]) 02/2019    Dr Cydney Christensen bx 6/12+, psa 4.4, [place] 3+3; 2nd opinion Dr Viri Recinos; now seeing Dr Amrik Light; s/p ext beam radiation Dr Morgan Second    Radiation proctitis 11/2022    Dr Lino Tyler    Sickle cell anemia [facility])     Syncope     Dr Gordon Hallmark; tilt neg, holter neg    Thiamine deficiency 03/2022    Dr Lawanda Harmon     Past Surgical History:   Procedure Laterality Date    COLONOSCOPY      Dr. Maynor Null (6/12) neg; Dr Bruno Macias (7/15/20) polyp    COLONOSCOPY N/A 11/09/2022    Dr Lino Tyler; mult avm c/w radiation proctitis    HX CYSTECTOMY      KNEE CARTILAGE SURGERY Left 11/2015    medial Dr Merry Morales     Current Outpatient Medications   Medication Sig    valACYclovir (VALTREX) 1 g tablet take 1 tablet by mouth once daily    JANUVIA 100 MG tablet take 1 tablet by mouth once daily    tadalafil (CIALIS) 20 MG tablet Take 1

## 2023-07-17 NOTE — PROGRESS NOTES
Marcelina Chadwick Sr. presents today for   Chief Complaint   Patient presents with    Flank Pain     Right sided flank pain for the past 2 weeks. 1. \"Have you been to the ER, urgent care clinic since your last visit? Hospitalized since your last visit? \" Yes   6-30-23 @ [facility], Puncture wound    2. \"Have you seen or consulted any other health care providers outside of the [address] [place] [address] since your last visit? \" no     3. For patients aged 43-73: Has the patient had a colonoscopy / FIT/ Cologuard? Yes - no Care Gap present      If the patient is female:    4. For patients aged 43-66: Has the patient had a mammogram within the past 2 years? NA - based on age or sex      11. For patients aged 21-65: Has the patient had a pap smear?  NA - based on age or sex

## 2023-07-23 ENCOUNTER — TELEPHONE (OUTPATIENT)
Age: 63
End: 2023-07-23

## 2023-09-29 DIAGNOSIS — C61 MALIGNANT NEOPLASM OF PROSTATE (HCC): Primary | ICD-10-CM

## 2023-10-13 ENCOUNTER — APPOINTMENT (OUTPATIENT)
Dept: RADIATION THERAPY | Age: 63
End: 2023-10-13

## 2023-10-28 NOTE — PROGRESS NOTES
61 y.o. male who presents for evaluation. Continues to follow up w urology for the prostate ca    Denies any cardiovascular complaints. we saw him 12/22 for complaints of intermittent lightheadedness. Saw Dr Patricia Vázquez and holter and tilt table ok, he asked for opinion from another cardiologist but he was never called? Will send another referral     Denied any gi complaints. Denies polyuria, polydipsia, nocturia, vision change. Not checking sugars at this time. He was dx'ed wih probable early neuropathy and b1 def by Dr Jagruti Hernandez although he reports stopping the thiamine after the script ran out?     IF 4/18    Past Medical History:   Diagnosis Date    Allergic rhinitis     BPH (benign prostatic hyperplasia)     Colon polyp 07/15/2020    Dr Morena Arnett    Deviated septum     DM (diabetes mellitus) ([address]) 12/2015    IFG 3/10; on basis of elev a1c; neuropathy 3/22 on emg Dr Jagruti Hernandez 7/22    ED (erectile dysfunction)     Fatty liver     on US 6/10; Fib-4 was 0.91 from 5/12    H. pylori infection     EGD w dilation Dr Sybil Goldberg 3/15    HSV (herpes simplex virus) infection     Hyperlipidemia     Hypertension     Obesity     peak 292 lbs, bmi 37.8 from 2/14; IF 4/18 start weight 280 lbs    Prolactinoma ([address])     Dr. Mine Stevens     Prostate cancer [facility]) 02/2019    Dr Leonora Gongora bx 6/12+, psa 4.4, [place] 3+3; 2nd opinion Dr Masood Burroughs; now seeing Dr Rissa Correia; s/p ext beam radiation Dr Guerra Peak    Radiation proctitis 11/2022    Dr Lourdes Ortega    Sickle cell anemia [facility])     Syncope     Dr Patricia Vázquez; tilt neg, holter neg    Thiamine deficiency 03/2022    Dr Jagruti Hernandez     Past Surgical History:   Procedure Laterality Date    COLONOSCOPY      Dr. Roseanna Holter (6/12) neg; Dr Morena Arnett (7/15/20) polyp    COLONOSCOPY N/A 11/09/2022    Dr Lourdes Ortega; mult avm c/w radiation proctitis    HX CYSTECTOMY      KNEE CARTILAGE SURGERY Left 11/2015    medial Dr Smyth Servant History     Socioeconomic History    Marital status:      Spouse name: Not on file    Number

## 2023-10-30 ENCOUNTER — OFFICE VISIT (OUTPATIENT)
Age: 63
End: 2023-10-30
Payer: COMMERCIAL

## 2023-10-30 VITALS
SYSTOLIC BLOOD PRESSURE: 130 MMHG | DIASTOLIC BLOOD PRESSURE: 76 MMHG | BODY MASS INDEX: 30.44 KG/M2 | TEMPERATURE: 97.9 F | RESPIRATION RATE: 16 BRPM | OXYGEN SATURATION: 99 % | HEIGHT: 76 IN | WEIGHT: 250 LBS | HEART RATE: 81 BPM

## 2023-10-30 DIAGNOSIS — E11.40 CONTROLLED TYPE 2 DIABETES MELLITUS WITH DIABETIC NEUROPATHY, WITHOUT LONG-TERM CURRENT USE OF INSULIN (HCC): ICD-10-CM

## 2023-10-30 DIAGNOSIS — E78.5 DYSLIPIDEMIA: ICD-10-CM

## 2023-10-30 DIAGNOSIS — R55 PRE-SYNCOPE: Primary | ICD-10-CM

## 2023-10-30 DIAGNOSIS — Z23 INFLUENZA VACCINE NEEDED: ICD-10-CM

## 2023-10-30 DIAGNOSIS — E51.9 THIAMINE DEFICIENCY: ICD-10-CM

## 2023-10-30 DIAGNOSIS — E78.5 HYPERLIPIDEMIA, UNSPECIFIED HYPERLIPIDEMIA TYPE: ICD-10-CM

## 2023-10-30 PROCEDURE — 3078F DIAST BP <80 MM HG: CPT | Performed by: INTERNAL MEDICINE

## 2023-10-30 PROCEDURE — 90471 IMMUNIZATION ADMIN: CPT | Performed by: INTERNAL MEDICINE

## 2023-10-30 PROCEDURE — 99214 OFFICE O/P EST MOD 30 MIN: CPT | Performed by: INTERNAL MEDICINE

## 2023-10-30 PROCEDURE — 3074F SYST BP LT 130 MM HG: CPT | Performed by: INTERNAL MEDICINE

## 2023-10-30 PROCEDURE — 90674 CCIIV4 VAC NO PRSV 0.5 ML IM: CPT | Performed by: INTERNAL MEDICINE

## 2023-10-30 RX ORDER — GLUCOSAMINE HCL/CHONDROITIN SU 500-400 MG
CAPSULE ORAL
Qty: 100 STRIP | Refills: 1111 | Status: SHIPPED | OUTPATIENT
Start: 2023-10-30

## 2023-10-30 RX ORDER — THIAMINE MONONITRATE (VIT B1) 100 MG
100 TABLET ORAL DAILY
Qty: 90 TABLET | Refills: 3 | Status: SHIPPED | OUTPATIENT
Start: 2023-10-30

## 2023-10-30 SDOH — ECONOMIC STABILITY: FOOD INSECURITY: WITHIN THE PAST 12 MONTHS, YOU WORRIED THAT YOUR FOOD WOULD RUN OUT BEFORE YOU GOT MONEY TO BUY MORE.: NEVER TRUE

## 2023-10-30 SDOH — ECONOMIC STABILITY: FOOD INSECURITY: WITHIN THE PAST 12 MONTHS, THE FOOD YOU BOUGHT JUST DIDN'T LAST AND YOU DIDN'T HAVE MONEY TO GET MORE.: NEVER TRUE

## 2023-10-30 SDOH — ECONOMIC STABILITY: INCOME INSECURITY: HOW HARD IS IT FOR YOU TO PAY FOR THE VERY BASICS LIKE FOOD, HOUSING, MEDICAL CARE, AND HEATING?: NOT HARD AT ALL

## 2023-10-30 ASSESSMENT — PATIENT HEALTH QUESTIONNAIRE - PHQ9
SUM OF ALL RESPONSES TO PHQ QUESTIONS 1-9: 0
SUM OF ALL RESPONSES TO PHQ9 QUESTIONS 1 & 2: 0
SUM OF ALL RESPONSES TO PHQ QUESTIONS 1-9: 0
1. LITTLE INTEREST OR PLEASURE IN DOING THINGS: 0
SUM OF ALL RESPONSES TO PHQ QUESTIONS 1-9: 0
SUM OF ALL RESPONSES TO PHQ QUESTIONS 1-9: 0
2. FEELING DOWN, DEPRESSED OR HOPELESS: 0

## 2023-10-30 NOTE — PROGRESS NOTES
Chief Complaint   Patient presents with    Follow-up         Caitlin Singer. presents today for   Chief Complaint   Patient presents with    Follow-up           1. \"Have you been to the ER, urgent care clinic since your last visit? Hospitalized since your last visit? \" no    2. \"Have you seen or consulted any other health care providers outside of the [address] [place] [address] since your last visit? \" no     3. For patients aged 43-73: Has the patient had a colonoscopy / FIT/ Cologuard? Yes - no Care Gap present      If the patient is female:    4. For patients aged 43-66: Has the patient had a mammogram within the past 2 years? NA - based on age or sex      11. For patients aged 21-65: Has the patient had a pap smear?  NA - based on age or sex

## 2023-10-30 NOTE — PROGRESS NOTES
26 Pt. was asked if he had any allergies to eggs, polymixin or neomycin or a history of major reactions to any vaccines in the past. Patient denied same. Temperature: 97.9. Patient was given flu vaccine in left deltoid IM. No redness, swelling or bruising noted at injection site post injection. Pt. was given the flu vaccine prior to seeing Dr. Esperanza Spain and no adverse reactions noted prior to patient leaving our office.

## 2023-10-31 ENCOUNTER — TELEPHONE (OUTPATIENT)
Age: 63
End: 2023-10-31

## 2023-10-31 NOTE — TELEPHONE ENCOUNTER
----- Message from Orlando García MD sent at 10/30/2023 11:53 PM EDT -----  Pls give pt number for dr Rayne faulkner/[facility]

## 2023-11-04 ENCOUNTER — HOSPITAL ENCOUNTER (OUTPATIENT)
Facility: HOSPITAL | Age: 63
Discharge: HOME | End: 2023-11-07
Payer: COMMERCIAL

## 2023-11-04 DIAGNOSIS — C61 MALIGNANT NEOPLASM OF PROSTATE (HCC): ICD-10-CM

## 2023-11-04 DIAGNOSIS — D64.9 ANEMIA, UNSPECIFIED TYPE: ICD-10-CM

## 2023-11-04 DIAGNOSIS — E11.9 CONTROLLED TYPE 2 DIABETES MELLITUS WITHOUT COMPLICATION, WITHOUT LONG-TERM CURRENT USE OF INSULIN (HCC): ICD-10-CM

## 2023-11-04 LAB
BASOPHILS # BLD: 0 K/UL (ref 0–0.1)
BASOPHILS NFR BLD: 1 % (ref 0–2)
DIFFERENTIAL METHOD BLD: ABNORMAL
EOSINOPHIL # BLD: 0.1 K/UL (ref 0–0.4)
EOSINOPHIL NFR BLD: 3 % (ref 0–5)
ERYTHROCYTE [DISTWIDTH] IN BLOOD BY AUTOMATED COUNT: 13.7 % (ref 11.6–14.5)
FERRITIN SERPL-MCNC: 314 NG/ML (ref 8–388)
FOLATE SERPL-MCNC: 9.5 NG/ML (ref 3.1–17.5)
HBA1C MFR BLD: 5.6 % (ref 4.2–5.6)
HCT VFR BLD AUTO: 40.2 % (ref 36–48)
HGB BLD-MCNC: 13.3 G/DL (ref 13–16)
IMM GRANULOCYTES # BLD AUTO: 0 K/UL (ref 0–0.04)
IMM GRANULOCYTES NFR BLD AUTO: 0 % (ref 0–0.5)
IRON SATN MFR SERPL: 22 % (ref 20–50)
IRON SERPL-MCNC: 70 UG/DL (ref 50–175)
LYMPHOCYTES # BLD: 1.4 K/UL (ref 0.9–3.6)
LYMPHOCYTES NFR BLD: 39 % (ref 21–52)
MCH RBC QN AUTO: 28.8 PG (ref 24–34)
MCHC RBC AUTO-ENTMCNC: 33.1 G/DL (ref 31–37)
MCV RBC AUTO: 87 FL (ref 78–100)
MONOCYTES # BLD: 0.5 K/UL (ref 0.05–1.2)
MONOCYTES NFR BLD: 15 % (ref 3–10)
NEUTS SEG # BLD: 1.5 K/UL (ref 1.8–8)
NEUTS SEG NFR BLD: 42 % (ref 40–73)
NRBC # BLD: 0 K/UL (ref 0–0.01)
NRBC BLD-RTO: 0 PER 100 WBC
PLATELET # BLD AUTO: 218 K/UL (ref 135–420)
PMV BLD AUTO: 10.4 FL (ref 9.2–11.8)
PSA SERPL-MCNC: 0.4 NG/ML (ref 0–4)
PSA SERPL-MCNC: 0.4 NG/ML (ref 0–4)
RBC # BLD AUTO: 4.62 M/UL (ref 4.35–5.65)
TIBC SERPL-MCNC: 324 UG/DL (ref 250–450)
VIT B12 SERPL-MCNC: 451 PG/ML (ref 211–911)
WBC # BLD AUTO: 3.5 K/UL (ref 4.6–13.2)

## 2023-11-04 PROCEDURE — 85025 COMPLETE CBC W/AUTO DIFF WBC: CPT

## 2023-11-04 PROCEDURE — 84403 ASSAY OF TOTAL TESTOSTERONE: CPT

## 2023-11-04 PROCEDURE — 84153 ASSAY OF PSA TOTAL: CPT

## 2023-11-04 PROCEDURE — 84155 ASSAY OF PROTEIN SERUM: CPT

## 2023-11-04 PROCEDURE — 83540 ASSAY OF IRON: CPT

## 2023-11-04 PROCEDURE — G0103 PSA SCREENING: HCPCS

## 2023-11-04 PROCEDURE — 82728 ASSAY OF FERRITIN: CPT

## 2023-11-04 PROCEDURE — 83550 IRON BINDING TEST: CPT

## 2023-11-04 PROCEDURE — 84165 PROTEIN E-PHORESIS SERUM: CPT

## 2023-11-04 PROCEDURE — 36415 COLL VENOUS BLD VENIPUNCTURE: CPT

## 2023-11-04 PROCEDURE — 83036 HEMOGLOBIN GLYCOSYLATED A1C: CPT

## 2023-11-04 PROCEDURE — 82746 ASSAY OF FOLIC ACID SERUM: CPT

## 2023-11-04 PROCEDURE — 82607 VITAMIN B-12: CPT

## 2023-11-05 ENCOUNTER — TELEPHONE (OUTPATIENT)
Age: 63
End: 2023-11-05

## 2023-11-05 DIAGNOSIS — D64.9 ANEMIA, UNSPECIFIED TYPE: Primary | ICD-10-CM

## 2023-11-05 DIAGNOSIS — D70.9 NEUTROPENIA, UNSPECIFIED TYPE (HCC): ICD-10-CM

## 2023-11-06 LAB — TESTOST SERPL-MCNC: 498 NG/DL (ref 264–916)

## 2023-11-07 LAB
ALBUMIN SERPL ELPH-MCNC: 3.9 G/DL (ref 2.9–4.4)
ALBUMIN/GLOB SERPL: 1.4 (ref 0.7–1.7)
ALPHA1 GLOB SERPL ELPH-MCNC: 0.2 G/DL (ref 0–0.4)
ALPHA2 GLOB SERPL ELPH-MCNC: 0.6 G/DL (ref 0.4–1)
B-GLOBULIN SERPL ELPH-MCNC: 0.9 G/DL (ref 0.7–1.3)
GAMMA GLOB SERPL ELPH-MCNC: 1.1 G/DL (ref 0.4–1.8)
GLOBULIN SER CALC-MCNC: 2.8 G/DL (ref 2.2–3.9)
M PROTEIN SERPL ELPH-MCNC: NORMAL G/DL
PROT SERPL-MCNC: 6.7 G/DL (ref 6–8.5)

## 2023-11-30 ENCOUNTER — HOSPITAL ENCOUNTER (OUTPATIENT)
Facility: HOSPITAL | Age: 63
Setting detail: RECURRING SERIES
End: 2023-11-30
Payer: COMMERCIAL

## 2023-11-30 PROCEDURE — 99213 OFFICE O/P EST LOW 20 MIN: CPT | Performed by: RADIOLOGY

## 2023-11-30 PROCEDURE — 99213 OFFICE O/P EST LOW 20 MIN: CPT

## 2023-12-01 ENCOUNTER — TELEPHONE (OUTPATIENT)
Age: 63
End: 2023-12-01

## 2023-12-01 DIAGNOSIS — E11.40 CONTROLLED TYPE 2 DIABETES MELLITUS WITH DIABETIC NEUROPATHY, WITHOUT LONG-TERM CURRENT USE OF INSULIN (HCC): ICD-10-CM

## 2023-12-01 NOTE — TELEPHONE ENCOUNTER
Please resend to [employer]. Stating he has been to the pharmacy twice and they are telling him they never received the rx's.    blood glucose monitor strips 100 strip 1111 10/30/2023     Sig: Test 1 times a day & as needed for symptoms of irregular blood glucose. Dispense sufficient amount for indicated testing frequency plus additional to accommodate PRN testing needs. Notes to Pharmacy: (1) Identify specific brand and product. (2) Include specific quantity. (3) Include frequency. blood glucose monitor kit and supplies 1 kit 0 10/30/2023     Sig: Dispense sufficient amount for indicated testing frequency plus additional to accommodate PRN testing needs. Dispense all needed supplies to include: monitor, strips, lancing device, lancets, control solutions, alcohol swabs.

## 2023-12-04 RX ORDER — GLUCOSAMINE HCL/CHONDROITIN SU 500-400 MG
CAPSULE ORAL
Qty: 100 STRIP | Refills: 11 | Status: SHIPPED | OUTPATIENT
Start: 2023-12-04

## 2024-02-25 RX ORDER — BENAZEPRIL HYDROCHLORIDE 20 MG/1
TABLET ORAL
Qty: 90 TABLET | Refills: 3 | Status: SHIPPED | OUTPATIENT
Start: 2024-02-25

## 2024-02-25 RX ORDER — SITAGLIPTIN 100 MG/1
TABLET, FILM COATED ORAL
Qty: 90 TABLET | Refills: 3 | Status: SHIPPED | OUTPATIENT
Start: 2024-02-25

## 2024-03-13 ENCOUNTER — HOSPITAL ENCOUNTER (EMERGENCY)
Facility: HOSPITAL | Age: 64
Discharge: HOME | End: 2024-03-13
Attending: EMERGENCY MEDICINE
Payer: COMMERCIAL

## 2024-03-13 VITALS
TEMPERATURE: 98.1 F | HEIGHT: 76 IN | HEART RATE: 77 BPM | BODY MASS INDEX: 31.05 KG/M2 | SYSTOLIC BLOOD PRESSURE: 123 MMHG | RESPIRATION RATE: 20 BRPM | WEIGHT: 255 LBS | OXYGEN SATURATION: 100 % | DIASTOLIC BLOOD PRESSURE: 56 MMHG

## 2024-03-13 DIAGNOSIS — H61.23 BILATERAL IMPACTED CERUMEN: Primary | ICD-10-CM

## 2024-03-13 PROCEDURE — 69209 REMOVE IMPACTED EAR WAX UNI: CPT

## 2024-03-13 PROCEDURE — 99282 EMERGENCY DEPT VISIT SF MDM: CPT

## 2024-03-13 NOTE — ED TRIAGE NOTES
Pt c/o of ear fullness in both ears. Pt states \"I feel like I might have some wax in both my ears.\" Denies any pain at this time.

## 2024-03-14 NOTE — ED PROVIDER NOTES
`[facility] EM[facility] DEPT  eMERGENCY dEPARTMENT eNCOUnter      Pt Name: Jason Chadwick Sr.  MRN: [id number]  Birthdate 1960 of evaluation: 3/13/2024  Provider:Cain Peralta MD    CHIEF COMPLAINT         HPI    Jason Chadwick Sr. is a 64 y.o. male  c/o having bilateral ear impaction x 2 days  No other complaints.,    ROS  Review of Systems   HENT:          Bilateral ear clogged   All other systems reviewed and are negative.      Except as noted above the remainder of the review of systems was reviewed and negative.       PAST MEDICAL HISTORY     Past Medical History:   Diagnosis Date    Allergic rhinitis     BPH (benign prostatic hyperplasia)     Colon polyp 07/15/2020    Dr Tucker    Deviated septum     DM (diabetes mellitus) (HCC) 12/2015    IFG 3/10; on basis of elev a1c; neuropathy 3/22 on emg Dr Jeong 7/22    ED (erectile dysfunction)     Fatty liver     on US 6/10; Fib-4 was 0.91 from 5/12    H. pylori infection     EGD w dilation Dr Sanchez 3/15    HSV (herpes simplex virus) infection     Hyperlipidemia     Hypertension     Obesity     peak 292 lbs, bmi 37.8 from 2/14; IF 4/18 start weight 280 lbs    Prolactinoma (HCC)     Dr. Garza     Prostate cancer (HCC) 02/2019    Dr Daigle bx 6/12+, psa 4.4, [place] 3+3; 2nd opinion Dr Whipple; now seeing Dr Menjivar; s/p ext beam radiation Dr Nayak    Radiation proctitis 11/2022    Dr Bowman    Sickle cell anemia (HCC)     Syncope     Dr Castellon; tilt neg, holter neg    Thiamine deficiency 03/2022    Dr Jeong         SURGICAL HISTORY       Past Surgical History:   Procedure Laterality Date    COLONOSCOPY      Dr. Schulz (6/12) neg; Dr Tucker (7/15/20) polyp    COLONOSCOPY N/A 11/09/2022    Dr Bowman; mult avm c/w radiation proctitis    HX CYSTECTOMY      KNEE CARTILAGE SURGERY Left 11/2015    medial Dr Lim         CURRENTMEDICATIONS       Previous Medications    ATORVASTATIN (LIPITOR) 80 MG TABLET    take 1 tablet by mouth once daily    BENAZEPRIL (LOTENSIN) 20 MG

## 2024-03-27 RX ORDER — ATORVASTATIN CALCIUM 80 MG/1
TABLET, FILM COATED ORAL
Qty: 90 TABLET | Refills: 3 | Status: SHIPPED | OUTPATIENT
Start: 2024-03-27

## 2024-05-14 ENCOUNTER — OFFICE VISIT (OUTPATIENT)
Age: 64
End: 2024-05-14
Payer: COMMERCIAL

## 2024-05-14 VITALS — RESPIRATION RATE: 16 BRPM | BODY MASS INDEX: 30.43 KG/M2 | HEIGHT: 76 IN

## 2024-05-14 DIAGNOSIS — M54.12 CERVICAL RADICULOPATHY: Primary | ICD-10-CM

## 2024-05-14 PROCEDURE — 99203 OFFICE O/P NEW LOW 30 MIN: CPT | Performed by: ORTHOPAEDIC SURGERY

## 2024-05-14 NOTE — PROGRESS NOTES
[facility] AMBULATORY OFFICE NOTE      Patient: Jason Chadwick Sr.                MRN: [id number]       SSN: [id number]  YOB: 1960        AGE: 64 y.o.        SEX: male  Body mass index is 30.43 kg/m².    PCP: Angel Thorne MD  05/14/24    CHIEF COMPLAINT: Left arm numbness and tingling    HPI: Jason Chadwick Sr. is a 64 y.o. male patient who complains of intermittent left arm numbness and tingling.  He noted it a few months back.  He was seen by his primary doctor.  He had some occasional left shoulder pain but that is mostly resolved.  He only reports numbness and tingling down the left arm.    Past Medical History:   Diagnosis Date    Allergic rhinitis     BPH (benign prostatic hyperplasia)     Colon polyp 07/15/2020    Dr Tucker    Deviated septum     DM (diabetes mellitus) ([facility]) 12/2015    IFG 3/10; on basis of elev a1c; neuropathy 3/22 on emg Dr Jeong 7/22    ED (erectile dysfunction)     Fatty liver     on US 6/10; Fib-4 was 0.91 from 5/12    H. pylori infection     EGD w dilation Dr Sanchez 3/15    HSV (herpes simplex virus) infection     Hyperlipidemia     Hypertension     Obesity     peak 292 lbs, bmi 37.8 from 2/14; IF 4/18 start weight 280 lbs    Prolactinoma ([facility])     Dr. Garza     Prostate cancer ([facility]) 02/2019    Dr Daigle bx 6/12+, psa 4.4, [place] 3+3; 2nd opinion Dr Whipple; now seeing Dr Menjivar; s/p ext beam radiation Dr Nayak    Radiation proctitis 11/2022    Dr Bowman    Sickle cell anemia ([facility])     Syncope     Dr Castellon; tilt neg, holter neg    Thiamine deficiency 03/2022    Dr Jeong       Family History   Problem Relation Age of Onset    Stroke Father     Diabetes Sister     Cancer Brother     Prostate Cancer Brother     Hypertension Mother     Hypertension Father        Current Outpatient Medications   Medication Sig Dispense Refill    atorvastatin (LIPITOR) 80 MG tablet take 1 tablet by mouth once daily 90 tablet 3    benazepril (LOTENSIN) 20

## 2024-05-15 ENCOUNTER — OFFICE VISIT (OUTPATIENT)
Age: 64
End: 2024-05-15
Payer: COMMERCIAL

## 2024-05-15 VITALS
WEIGHT: 248.8 LBS | OXYGEN SATURATION: 97 % | SYSTOLIC BLOOD PRESSURE: 145 MMHG | DIASTOLIC BLOOD PRESSURE: 88 MMHG | HEIGHT: 76 IN | HEART RATE: 82 BPM | BODY MASS INDEX: 30.3 KG/M2 | TEMPERATURE: 97.2 F

## 2024-05-15 DIAGNOSIS — M54.12 CERVICAL RADICULAR PAIN: Primary | ICD-10-CM

## 2024-05-15 PROCEDURE — 99203 OFFICE O/P NEW LOW 30 MIN: CPT | Performed by: PHYSICAL MEDICINE & REHABILITATION

## 2024-05-15 PROCEDURE — 3077F SYST BP >= 140 MM HG: CPT | Performed by: PHYSICAL MEDICINE & REHABILITATION

## 2024-05-15 PROCEDURE — 3079F DIAST BP 80-89 MM HG: CPT | Performed by: PHYSICAL MEDICINE & REHABILITATION

## 2024-05-15 PROCEDURE — 72040 X-RAY EXAM NECK SPINE 2-3 VW: CPT | Performed by: PHYSICAL MEDICINE & REHABILITATION

## 2024-05-15 NOTE — PATIENT INSTRUCTIONS
your other arm.  Shoulder stretch    Relax your shoulders.  Raise one arm to shoulder height, and reach it across your chest.  Pull the arm slightly toward you with your other arm. This will help you get a gentle stretch. Hold for about 6 seconds.  Repeat 2 to 4 times.  Shoulder blade squeeze    Sit or stand up tall with your arms at your sides.  Keep your shoulders relaxed and down, not shrugged.  Squeeze your shoulder blades together. Hold for 6 seconds, then relax.  Repeat 8 to 12 times.  Straight-arm shoulder blade squeeze    Sit or stand tall. Relax your shoulders.  With palms down, hold your elastic tubing or band straight out in front of you.  Start with slight tension in the tubing or band, with your hands about shoulder-width apart.  Slowly pull straight out to the sides, squeezing your shoulder blades together. Keep your arms straight and at shoulder height. Slowly release.  Repeat 8 to 12 times.  Rowing    [place] your elastic tubing or band at about waist height. Take one end in each hand.  Sit or stand with your feet hip-width apart.  Hold your arms straight in front of you. Adjust your distance to create slight tension in the tubing or band.  Slightly tuck your chin. Relax your shoulders.  Without shrugging your shoulders, pull straight back. Your elbows will pass alongside your waist.  Pull-downs    [place] your elastic tubing or band in the top of a closed door. Take one end in each hand.  Either sit or stand, depending on what is more comfortable. If you feel unsteady, sit on a chair.  Start with your arms up and comfortably apart, elbows straight. There should be a slight tension in the tubing or band.  Slightly tuck your chin, and look straight ahead.  Keeping your back straight, slowly pull down and back, bending your elbows.  Stop where your hands are level with your chin, in a \"goalpost\" position.  Repeat 8 to 12 times.  Chest T stretch    Lie on your back. Raise your knees so they are bent.

## 2024-05-15 NOTE — PROGRESS NOTES
[facility]  [facility] [facility], [address]  [place] [address]  Phone: [phone]  Fax: [phone]        Jason Chadwick  : 1960  PCP: Angel Thorne MD    NEW PATIENT EVALUATION      ASSESSMENT AND PLAN    Jason was seen today for neck pain.    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  Patient has intermittent distal paresthesias without significant weakness.  Discussed preventative strategies.  Patient given physician-directed neck and scapular exercises to perform as tolerated.  Avoid overhead lifting. No lifting > 20 lbs.  Recommended OTC APAP PRN for breakthrough pain.       Follow-up and Dispositions    Return if symptoms worsen or fail to improve.            HISTORY OF PRESENT ILLNESS    Jason Chadwick Sr. is seen today in consultation for paresthesias in his left hand. Patient was referred by PCP Angel Thorne MD. Notes will be sent to referring provider.    Patient describes sudden onset of intermittent numbness and tingling in his left arm and hand x6 weeks. He endorses tightness in his neck. He denies current sleep disturbances due to pain.  Symptoms much better than onset.  Denies prior history of cervical or lumbar radiculopathy.    Patient denies weakness or clumsiness in his left arm and hand.     Patient denies Hx of CTS.           5/15/2024    10:09 AM   AMB PAIN ASSESSMENT   Location of Pain Neck   Severity of Pain 2   Quality of Pain Aching   Duration of Pain A few hours   Frequency of Pain Intermittent   Aggravating Factors Other (Comment)   Limiting Behavior Yes   Relieving Factors Other (Comment)   Result of Injury No   Work-Related Injury No   Are there other pain locations you wish to document? No         Onset of pain: 2024, no

## 2024-05-15 NOTE — PROGRESS NOTES
Jason Chadwick Sr. presents today for   Chief Complaint   Patient presents with    Neck Pain     Left arm N/T       Is someone accompanying this pt? no    Is the patient using any DME equipment during OV? no      Coordination of Care:  1. Have you been to the ER, urgent care clinic since your last visit? Yes, Left ear 1 month ago  Hospitalized since your last visit? no    2. Have you seen or consulted any other health care providers outside of the [facility] since your last visit? no Include any pap smears or colon screening. no

## 2024-08-18 RX ORDER — VALACYCLOVIR HYDROCHLORIDE 1 G/1
TABLET, FILM COATED ORAL
Qty: 90 TABLET | Refills: 3 | Status: SHIPPED | OUTPATIENT
Start: 2024-08-18

## 2024-12-04 DIAGNOSIS — C61 PROSTATE CANCER (HCC): Primary | ICD-10-CM

## 2024-12-06 ENCOUNTER — HOSPITAL ENCOUNTER (OUTPATIENT)
Facility: HOSPITAL | Age: 64
Discharge: HOME | End: 2024-12-09
Payer: COMMERCIAL

## 2024-12-06 DIAGNOSIS — C61 PROSTATE CANCER (HCC): ICD-10-CM

## 2024-12-06 LAB — PSA SERPL-MCNC: 0.2 NG/ML (ref 0–4)

## 2024-12-06 PROCEDURE — 84403 ASSAY OF TOTAL TESTOSTERONE: CPT

## 2024-12-06 PROCEDURE — 36415 COLL VENOUS BLD VENIPUNCTURE: CPT

## 2024-12-06 PROCEDURE — 84153 ASSAY OF PSA TOTAL: CPT

## 2024-12-07 LAB — TESTOST SERPL-MCNC: 519 NG/DL (ref 264–916)

## 2024-12-20 DIAGNOSIS — C61 PROSTATE CANCER (HCC): Primary | ICD-10-CM

## 2024-12-26 ENCOUNTER — OFFICE VISIT (OUTPATIENT)
Facility: CLINIC | Age: 64
End: 2024-12-26

## 2024-12-26 VITALS
SYSTOLIC BLOOD PRESSURE: 142 MMHG | WEIGHT: 253 LBS | HEIGHT: 76 IN | DIASTOLIC BLOOD PRESSURE: 84 MMHG | TEMPERATURE: 98.1 F | OXYGEN SATURATION: 100 % | RESPIRATION RATE: 16 BRPM | BODY MASS INDEX: 30.81 KG/M2 | HEART RATE: 74 BPM

## 2024-12-26 DIAGNOSIS — E11.40 CONTROLLED TYPE 2 DIABETES MELLITUS WITH DIABETIC NEUROPATHY, WITHOUT LONG-TERM CURRENT USE OF INSULIN (HCC): ICD-10-CM

## 2024-12-26 DIAGNOSIS — Z23 NEED FOR IMMUNIZATION AGAINST INFLUENZA: Primary | ICD-10-CM

## 2024-12-26 DIAGNOSIS — H93.12 TINNITUS OF LEFT EAR: ICD-10-CM

## 2024-12-26 DIAGNOSIS — Z23 IMMUNIZATION DUE: ICD-10-CM

## 2024-12-26 DIAGNOSIS — H91.92 HEARING LOSS OF LEFT EAR, UNSPECIFIED HEARING LOSS TYPE: ICD-10-CM

## 2024-12-26 NOTE — PROGRESS NOTES
64 y.o. male who presents for evaluation.    Earlier this year, he had a physical with the [employer] and was told that he had mild left-sided hearing loss.  In the last 2 weeks, he has noted some ringing in that ear as well.  No associated dizziness, ear pain.  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  He is requesting ENT evaluation as he will be retiring in March timeframe.    He has not been checking his pressures.  No cardiovascular complaints, remains active without set exercise.    Past Medical History:   Diagnosis Date    Allergic rhinitis     BPH (benign prostatic hyperplasia)     Colon polyp 07/15/2020    Dr Tucker    Deviated septum     DM (diabetes mellitus) ([facility]) 12/2015    IFG 3/10; on basis of elev a1c; neuropathy 3/22 on emg Dr Jeong 7/22    ED (erectile dysfunction)     Fatty liver     on US 6/10; Fib-4 was 0.91 from 5/12    H. pylori infection     EGD w dilation Dr Sanchez 3/15    HSV (herpes simplex virus) infection     Hyperlipidemia     Hypertension     Obesity     peak 292 lbs, bmi 37.8 from 2/14; IF 4/18 start weight 280 lbs    Prolactinoma ([facility])     Dr. Garza     Prostate cancer ([facility]) 02/2019    Dr Daigle bx 6/12+, psa 4.4, [place] 3+3; 2nd opinion Dr Whipple; now seeing Dr Menjivar; s/p ext beam radiation Dr Nayak    Radiation proctitis 11/2022    Dr Bowman    Sickle cell anemia ([facility])     Syncope     Dr Castellon; tilt neg, holter neg    Thiamine deficiency 03/2022    Dr Jeong     Past Surgical History:   Procedure Laterality Date    COLONOSCOPY      Dr. Schulz (6/12) neg; Dr Tucker (7/15/20) polyp    COLONOSCOPY N/A 11/09/2022    Dr Bowman; mult avm c/w radiation proctitis    HX CYSTECTOMY      KNEE CARTILAGE SURGERY Left 11/2015    medial Dr Lim     Current Outpatient Medications   Medication Sig    sildenafil (VIAGRA) 100 MG tablet Take 1 tablet by mouth daily as needed for Erectile Dysfunction    valACYclovir (VALTREX) 1 g tablet take 1 tablet by

## 2024-12-26 NOTE — PROGRESS NOTES
Immunizations Administered       Name Date Dose Route    Influenza, FLUCELVAX, (age 6 mo+) IM, Trivalent PF, 0.5mL 12/26/2024 0.5 mL Intramuscular    Site: Deltoid- Left    Lot: [id number]    NDC: 40697-869-06    Pneumococcal, PCV20, PREVNAR 20, (age 6w+), IM, 0.5mL 12/26/2024 0.5 mL Intramuscular    Site: Deltoid- Right    Lot: [id number]    NDC: 0279-7913-14

## 2024-12-26 NOTE — PROGRESS NOTES
Verbal order read back per Dr. Thorne.  Patient received Influenza vaccine in LEFT deltoid.  Patient tolerated well and left without complaints.  Patient received VIS.

## 2024-12-26 NOTE — PROGRESS NOTES
Jason Chadwick Sr. presents today for   Chief Complaint   Patient presents with    Tinnitus     Left ear; over a week       \"Have you been to the ER, urgent care clinic since your last visit?  Hospitalized since your last visit?\"    NO    “Have you seen or consulted any other health care providers outside of [facility] since your last visit?”    NO

## 2025-01-02 ENCOUNTER — HOSPITAL ENCOUNTER (OUTPATIENT)
Facility: HOSPITAL | Age: 65
Setting detail: RECURRING SERIES
Discharge: HOME | End: 2025-01-05
Payer: COMMERCIAL

## 2025-01-02 DIAGNOSIS — C61 MALIGNANT NEOPLASM OF PROSTATE (HCC): Primary | ICD-10-CM

## 2025-01-02 DIAGNOSIS — C61 PROSTATE CANCER (HCC): ICD-10-CM

## 2025-01-02 PROCEDURE — 99213 OFFICE O/P EST LOW 20 MIN: CPT

## 2025-02-28 RX ORDER — BENAZEPRIL HYDROCHLORIDE 20 MG/1
TABLET ORAL
Qty: 90 TABLET | Refills: 3 | Status: SHIPPED | OUTPATIENT
Start: 2025-02-28

## 2025-03-10 ENCOUNTER — OFFICE VISIT (OUTPATIENT)
Facility: CLINIC | Age: 65
End: 2025-03-10
Payer: COMMERCIAL

## 2025-03-10 ENCOUNTER — HOSPITAL ENCOUNTER (OUTPATIENT)
Facility: HOSPITAL | Age: 65
Setting detail: SPECIMEN
Discharge: HOME | End: 2025-03-13
Payer: COMMERCIAL

## 2025-03-10 VITALS
HEIGHT: 76 IN | OXYGEN SATURATION: 100 % | BODY MASS INDEX: 30.2 KG/M2 | HEART RATE: 74 BPM | WEIGHT: 248 LBS | SYSTOLIC BLOOD PRESSURE: 142 MMHG | TEMPERATURE: 98.1 F | RESPIRATION RATE: 16 BRPM | DIASTOLIC BLOOD PRESSURE: 92 MMHG

## 2025-03-10 DIAGNOSIS — I10 ESSENTIAL HYPERTENSION: ICD-10-CM

## 2025-03-10 DIAGNOSIS — E11.42 CONTROLLED TYPE 2 DIABETES MELLITUS WITH DIABETIC POLYNEUROPATHY, WITHOUT LONG-TERM CURRENT USE OF INSULIN (HCC): ICD-10-CM

## 2025-03-10 DIAGNOSIS — K76.0 METABOLIC DYSFUNCTION-ASSOCIATED STEATOTIC LIVER DISEASE (MASLD): ICD-10-CM

## 2025-03-10 DIAGNOSIS — K21.9 GASTROESOPHAGEAL REFLUX DISEASE WITHOUT ESOPHAGITIS: ICD-10-CM

## 2025-03-10 DIAGNOSIS — E66.01 SEVERE OBESITY (BMI 35.0-39.9) WITH COMORBIDITY: ICD-10-CM

## 2025-03-10 DIAGNOSIS — K59.00 CONSTIPATION, UNSPECIFIED CONSTIPATION TYPE: ICD-10-CM

## 2025-03-10 DIAGNOSIS — Z00.00 PHYSICAL EXAM: Primary | ICD-10-CM

## 2025-03-10 DIAGNOSIS — D35.2 PROLACTINOMA (HCC): ICD-10-CM

## 2025-03-10 DIAGNOSIS — E11.40 CONTROLLED TYPE 2 DIABETES MELLITUS WITH DIABETIC NEUROPATHY, WITHOUT LONG-TERM CURRENT USE OF INSULIN (HCC): ICD-10-CM

## 2025-03-10 DIAGNOSIS — E78.5 DYSLIPIDEMIA: ICD-10-CM

## 2025-03-10 LAB
ALBUMIN SERPL-MCNC: 4 G/DL (ref 3.4–5)
ALBUMIN/GLOB SERPL: 1.3 (ref 0.8–1.7)
ALP SERPL-CCNC: 60 U/L (ref 45–117)
ALT SERPL-CCNC: 23 U/L (ref 16–61)
ANION GAP SERPL CALC-SCNC: 2 MMOL/L (ref 3–18)
AST SERPL-CCNC: 17 U/L (ref 10–38)
BASOPHILS # BLD: 0.04 K/UL (ref 0–0.1)
BASOPHILS NFR BLD: 1.2 % (ref 0–2)
BILIRUB SERPL-MCNC: 0.6 MG/DL (ref 0.2–1)
BUN SERPL-MCNC: 14 MG/DL (ref 7–18)
BUN/CREAT SERPL: 10 (ref 12–20)
CALCIUM SERPL-MCNC: 9.2 MG/DL (ref 8.5–10.1)
CHLORIDE SERPL-SCNC: 105 MMOL/L (ref 100–111)
CHOLEST SERPL-MCNC: 181 MG/DL
CO2 SERPL-SCNC: 32 MMOL/L (ref 21–32)
CREAT SERPL-MCNC: 1.34 MG/DL (ref 0.6–1.3)
CREAT UR-MCNC: 215 MG/DL (ref 30–125)
DIFFERENTIAL METHOD BLD: ABNORMAL
EOSINOPHIL # BLD: 0.05 K/UL (ref 0–0.4)
EOSINOPHIL NFR BLD: 1.4 % (ref 0–5)
ERYTHROCYTE [DISTWIDTH] IN BLOOD BY AUTOMATED COUNT: 13.5 % (ref 11.6–14.5)
GLOBULIN SER CALC-MCNC: 3.1 G/DL (ref 2–4)
GLUCOSE SERPL-MCNC: 99 MG/DL (ref 74–99)
HBA1C MFR BLD: 5.9 % (ref 4.2–5.6)
HCT VFR BLD AUTO: 41.2 % (ref 36–48)
HDLC SERPL-MCNC: 98 MG/DL (ref 40–60)
HDLC SERPL: 1.8 (ref 0–5)
HGB BLD-MCNC: 13.4 G/DL (ref 13–16)
IMM GRANULOCYTES # BLD AUTO: 0.01 K/UL (ref 0–0.04)
IMM GRANULOCYTES NFR BLD AUTO: 0.3 % (ref 0–0.5)
LDLC SERPL CALC-MCNC: 71.6 MG/DL (ref 0–100)
LIPID PANEL: ABNORMAL
LYMPHOCYTES # BLD: 1.36 K/UL (ref 0.9–3.6)
LYMPHOCYTES NFR BLD: 39.4 % (ref 21–52)
MCH RBC QN AUTO: 28 PG (ref 24–34)
MCHC RBC AUTO-ENTMCNC: 32.5 G/DL (ref 31–37)
MCV RBC AUTO: 86 FL (ref 78–100)
MICROALBUMIN UR-MCNC: 0.59 MG/DL (ref 0–3)
MICROALBUMIN/CREAT UR-RTO: 3 MG/G (ref 0–30)
MONOCYTES # BLD: 0.42 K/UL (ref 0.05–1.2)
MONOCYTES NFR BLD: 12.2 % (ref 3–10)
NEUTS SEG # BLD: 1.57 K/UL (ref 1.8–8)
NEUTS SEG NFR BLD: 45.5 % (ref 40–73)
NRBC # BLD: 0 K/UL (ref 0–0.01)
NRBC BLD-RTO: 0 PER 100 WBC
PLATELET # BLD AUTO: 215 K/UL (ref 135–420)
PMV BLD AUTO: 10.8 FL (ref 9.2–11.8)
POTASSIUM SERPL-SCNC: 4.5 MMOL/L (ref 3.5–5.5)
PROT SERPL-MCNC: 7.1 G/DL (ref 6.4–8.2)
RBC # BLD AUTO: 4.79 M/UL (ref 4.35–5.65)
SODIUM SERPL-SCNC: 139 MMOL/L (ref 136–145)
TRIGL SERPL-MCNC: 57 MG/DL
VLDLC SERPL CALC-MCNC: 11.4 MG/DL
WBC # BLD AUTO: 3.5 K/UL (ref 4.6–13.2)

## 2025-03-10 PROCEDURE — 80053 COMPREHEN METABOLIC PANEL: CPT

## 2025-03-10 PROCEDURE — 83036 HEMOGLOBIN GLYCOSYLATED A1C: CPT

## 2025-03-10 PROCEDURE — 99397 PER PM REEVAL EST PAT 65+ YR: CPT | Performed by: INTERNAL MEDICINE

## 2025-03-10 PROCEDURE — 36415 COLL VENOUS BLD VENIPUNCTURE: CPT

## 2025-03-10 PROCEDURE — 80061 LIPID PANEL: CPT

## 2025-03-10 PROCEDURE — 3080F DIAST BP >= 90 MM HG: CPT | Performed by: INTERNAL MEDICINE

## 2025-03-10 PROCEDURE — 3077F SYST BP >= 140 MM HG: CPT | Performed by: INTERNAL MEDICINE

## 2025-03-10 PROCEDURE — 82570 ASSAY OF URINE CREATININE: CPT

## 2025-03-10 PROCEDURE — 82043 UR ALBUMIN QUANTITATIVE: CPT

## 2025-03-10 PROCEDURE — 85025 COMPLETE CBC W/AUTO DIFF WBC: CPT

## 2025-03-10 RX ORDER — SITAGLIPTIN 100 MG/1
TABLET, FILM COATED ORAL
Qty: 90 TABLET | Refills: 3 | Status: SHIPPED | OUTPATIENT
Start: 2025-03-10

## 2025-03-10 RX ORDER — OMEPRAZOLE 40 MG/1
40 CAPSULE, DELAYED RELEASE ORAL
Qty: 90 CAPSULE | Refills: 1 | Status: SHIPPED | OUTPATIENT
Start: 2025-03-10

## 2025-03-10 RX ORDER — BENAZEPRIL HYDROCHLORIDE 40 MG/1
40 TABLET ORAL DAILY
Qty: 90 TABLET | Refills: 3 | Status: SHIPPED | OUTPATIENT
Start: 2025-03-10

## 2025-03-10 SDOH — ECONOMIC STABILITY: FOOD INSECURITY: WITHIN THE PAST 12 MONTHS, THE FOOD YOU BOUGHT JUST DIDN'T LAST AND YOU DIDN'T HAVE MONEY TO GET MORE.: NEVER TRUE

## 2025-03-10 SDOH — ECONOMIC STABILITY: FOOD INSECURITY: WITHIN THE PAST 12 MONTHS, YOU WORRIED THAT YOUR FOOD WOULD RUN OUT BEFORE YOU GOT MONEY TO BUY MORE.: NEVER TRUE

## 2025-03-10 ASSESSMENT — PATIENT HEALTH QUESTIONNAIRE - PHQ9
SUM OF ALL RESPONSES TO PHQ QUESTIONS 1-9: 0
2. FEELING DOWN, DEPRESSED OR HOPELESS: NOT AT ALL
1. LITTLE INTEREST OR PLEASURE IN DOING THINGS: NOT AT ALL

## 2025-03-10 NOTE — PROGRESS NOTES
Jason Chadwick Sr. presents today for   Chief Complaint   Patient presents with    Annual Exam       \"Have you been to the ER, urgent care clinic since your last visit?  Hospitalized since your last visit?\"    NO    “Have you seen or consulted any other health care providers outside of [facility] since your last visit?”    NO             
neg   chol 156, tg 47,   hdl 99, ldl-c 48,           From 2/19 showed                    hba1c 7.0,                                            hep b/c-, hiv neg, tpall neg  From 7/19 showed    gluc 103, cr 1.38, gfr>60  From 2/20 showed    gluc 92,   cr 1.22, gfr>60, alt 30, hba1c 6.3, umar neg,  chol 160, tg 42,   hdl 91, ldl-c 61,   wbc 3.8, hb 13.7, plt 217  From 3/20 showed    gluc 90,   cr 1.30, gfr>60,                   psa 3.61   b12 477, fol 11.9f, ferritin 283,   From 8/20 showed    gluc 91,   cr 1.29, gfr>60,                   psa 4.80  From 2/21 showed    gluc 91,   cr 1.09, gfr>60, alt 25, hba1c 6.1, umar 5,      chol 183, tg 49,   hdl 93, ldl-c 80,   wbc 3.6, hb 13.6, plt 230  From 8/21 showed    gluc 97,   cr 1.19, gfr>60, alt 24, hba1c 6.0  Form 2/22 showed                       psa 0.26, test 591  From 3/22 showed          hba1c 5.8, umar 4,      chol 185, tg 65,  hdl 88, ldl-c 84,    wbc 3.4, hb 12.6, plt 219, psa 1.40  From 7/22 showed      cr 1.40, gfr 55,  alt 29, hba1c 6.2,                      b12 605, fol 9.2, mma 209, b1 49.7, b6 30.9, rpr neg, tocopherol nl  From 4/23 showed    gluc 100, cr 1.19, gfr>60, alt 25, hba1c 5.4,        chol 198, tg 42,  hdl 102, ldl-c 88,  wbc 3.1, hb 12.6, plt 188  From 7/23 showed    gluc 97,   cr 1.31, gfr>60, alt 24,          wbc 3.3, hb 12.2, plr 185, ua neg  From 11/23 showed          hba1c 5.6,                      b12 451, fol 9.5,  fe 70, %sat 22, ferritin 314, spep neg    No results found for this or any previous visit (from the past 2160 hours).        We reviewed the patient's labs from the last several visits to point out trends in the numbers    Patient Active Problem List   Diagnosis    Erectile dysfunction    Prostate cancer (HCC)    Hyperlipidemia    Essential hypertension    Prolactinoma (HCC)    Severe obesity (BMI 35.0-39.9) with comorbidity    Controlled type 2 diabetes mellitus, without long-term current use of insulin (HCC)    Pain of upper

## 2025-03-14 ENCOUNTER — TELEPHONE (OUTPATIENT)
Facility: CLINIC | Age: 65
End: 2025-03-14

## 2025-03-14 DIAGNOSIS — K59.00 CONSTIPATION, UNSPECIFIED CONSTIPATION TYPE: Primary | ICD-10-CM

## 2025-03-14 NOTE — TELEPHONE ENCOUNTER
Patient's prior authorization for Linzess was denied. Please advise if another medication can be prescribed for patient.     [id number] - [address]# [phone]

## 2025-03-17 RX ORDER — LUBIPROSTONE 8 UG/1
8 CAPSULE ORAL DAILY
Qty: 30 CAPSULE | Refills: 3 | Status: SHIPPED | OUTPATIENT
Start: 2025-03-17

## 2025-03-17 NOTE — TELEPHONE ENCOUNTER
Pls call    Amitiza appears to be preferred and linzess is not - former sent to pharmacy     Diagnosis Orders   1. Constipation, unspecified constipation type  lubiprostone (AMITIZA) 8 MCG CAPS capsule

## 2025-03-17 NOTE — TELEPHONE ENCOUNTER
Pt contacted and notified of medication. Patient would like to know if doctor had a chance to look over his lab results and if there is anything that he should be aware of.

## 2025-04-01 RX ORDER — ATORVASTATIN CALCIUM 80 MG/1
80 TABLET, FILM COATED ORAL DAILY
Qty: 90 TABLET | Refills: 3 | Status: SHIPPED | OUTPATIENT
Start: 2025-04-01

## 2025-08-27 ENCOUNTER — OFFICE VISIT (OUTPATIENT)
Facility: CLINIC | Age: 65
End: 2025-08-27
Payer: MEDICARE

## 2025-08-27 VITALS
HEIGHT: 76 IN | HEART RATE: 100 BPM | BODY MASS INDEX: 29.47 KG/M2 | SYSTOLIC BLOOD PRESSURE: 98 MMHG | WEIGHT: 242 LBS | DIASTOLIC BLOOD PRESSURE: 64 MMHG | OXYGEN SATURATION: 99 % | RESPIRATION RATE: 16 BRPM | TEMPERATURE: 98.2 F

## 2025-08-27 DIAGNOSIS — R55 POSTURAL DIZZINESS WITH PRESYNCOPE: Primary | ICD-10-CM

## 2025-08-27 DIAGNOSIS — R42 POSTURAL DIZZINESS WITH PRESYNCOPE: Primary | ICD-10-CM

## 2025-08-27 PROCEDURE — G8417 CALC BMI ABV UP PARAM F/U: HCPCS | Performed by: INTERNAL MEDICINE

## 2025-08-27 PROCEDURE — 3017F COLORECTAL CA SCREEN DOC REV: CPT | Performed by: INTERNAL MEDICINE

## 2025-08-27 PROCEDURE — 99214 OFFICE O/P EST MOD 30 MIN: CPT | Performed by: INTERNAL MEDICINE

## 2025-08-27 PROCEDURE — 3078F DIAST BP <80 MM HG: CPT | Performed by: INTERNAL MEDICINE

## 2025-08-27 PROCEDURE — 3074F SYST BP LT 130 MM HG: CPT | Performed by: INTERNAL MEDICINE

## 2025-08-27 PROCEDURE — 1123F ACP DISCUSS/DSCN MKR DOCD: CPT | Performed by: INTERNAL MEDICINE

## 2025-08-27 PROCEDURE — G8427 DOCREV CUR MEDS BY ELIG CLIN: HCPCS | Performed by: INTERNAL MEDICINE

## 2025-08-27 PROCEDURE — 1036F TOBACCO NON-USER: CPT | Performed by: INTERNAL MEDICINE

## (undated) DEVICE — SOLUTION IRRIG 1000ML H2O STRL BLT

## (undated) DEVICE — SYRINGE MED 25GA 3ML L5/8IN SUBQ PLAS W/ DETACH NDL SFTY

## (undated) DEVICE — Device: Brand: OLYMPUS

## (undated) DEVICE — LINER SUCT CANSTR 3000CC PLAS SFT PRE ASSEMB W/OUT TBNG W/

## (undated) DEVICE — GAUZE,SPONGE,4"X4",16PLY,STRL,LF,10/TRAY: Brand: MEDLINE

## (undated) DEVICE — SYR 10ML LUER LOK 1/5ML GRAD --

## (undated) DEVICE — CANNULA ORIG TL CLR W FOAM CUSHIONS AND 14FT SUPL TB 3 CHN

## (undated) DEVICE — CANNULA NSL AD TBNG L14FT STD PVC O2 CRV CONN NONFLARED NSL

## (undated) DEVICE — MEDI-VAC NON-CONDUCTIVE SUCTION TUBING: Brand: CARDINAL HEALTH

## (undated) DEVICE — CONNECTOR ELECSURG ARCONNECT AR PRB SGL USE DISP

## (undated) DEVICE — PROBE ARC STRAIGHT FIRE 23 MM OD X 220 CM 10/BX

## (undated) DEVICE — ENDOSCOPY PUMP TUBING/ CAP SET: Brand: ERBE

## (undated) DEVICE — FLUFF AND POLYMER UNDERPAD,EXTRA HEAVY: Brand: WINGS

## (undated) DEVICE — SYR 20ML LL STRL LF --

## (undated) DEVICE — YANKAUER,SMOOTH HANDLE,HIGH CAPACITY: Brand: MEDLINE INDUSTRIES, INC.

## (undated) DEVICE — CATHETER SUCT TR FL TIP 14FR W/ O CTRL

## (undated) DEVICE — SYR 50ML SLIP TIP NSAF LF STRL --

## (undated) DEVICE — GOWN ISOL IMPERV UNIV, DISP, OPEN BACK, BLUE --